# Patient Record
Sex: MALE | Race: WHITE | NOT HISPANIC OR LATINO | URBAN - METROPOLITAN AREA
[De-identification: names, ages, dates, MRNs, and addresses within clinical notes are randomized per-mention and may not be internally consistent; named-entity substitution may affect disease eponyms.]

---

## 2019-11-06 ENCOUNTER — INPATIENT (INPATIENT)
Facility: HOSPITAL | Age: 74
LOS: 8 days | Discharge: ANOTHER HOSPITAL | DRG: 280 | End: 2019-11-15
Attending: HOSPITALIST | Admitting: INTERNAL MEDICINE
Payer: MEDICARE

## 2019-11-06 VITALS
TEMPERATURE: 98 F | HEIGHT: 71 IN | WEIGHT: 179.9 LBS | RESPIRATION RATE: 18 BRPM | OXYGEN SATURATION: 98 % | HEART RATE: 99 BPM | DIASTOLIC BLOOD PRESSURE: 75 MMHG | SYSTOLIC BLOOD PRESSURE: 143 MMHG

## 2019-11-06 DIAGNOSIS — D64.9 ANEMIA, UNSPECIFIED: ICD-10-CM
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
## 2019-11-06 DIAGNOSIS — I50.810 RIGHT HEART FAILURE, UNSPECIFIED: ICD-10-CM

## 2019-11-06 DIAGNOSIS — N39.0 URINARY TRACT INFECTION, SITE NOT SPECIFIED: ICD-10-CM

## 2019-11-06 DIAGNOSIS — R79.1 ABNORMAL COAGULATION PROFILE: ICD-10-CM

## 2019-11-06 DIAGNOSIS — R74.0 NONSPECIFIC ELEVATION OF LEVELS OF TRANSAMINASE AND LACTIC ACID DEHYDROGENASE [LDH]: ICD-10-CM

## 2019-11-06 DIAGNOSIS — R06.03 ACUTE RESPIRATORY DISTRESS: ICD-10-CM

## 2019-11-06 DIAGNOSIS — N40.0 BENIGN PROSTATIC HYPERPLASIA WITHOUT LOWER URINARY TRACT SYMPTOMS: ICD-10-CM

## 2019-11-06 DIAGNOSIS — I21.3 ST ELEVATION (STEMI) MYOCARDIAL INFARCTION OF UNSPECIFIED SITE: ICD-10-CM

## 2019-11-06 DIAGNOSIS — Z29.9 ENCOUNTER FOR PROPHYLACTIC MEASURES, UNSPECIFIED: ICD-10-CM

## 2019-11-06 DIAGNOSIS — Z98.890 OTHER SPECIFIED POSTPROCEDURAL STATES: Chronic | ICD-10-CM

## 2019-11-06 DIAGNOSIS — I21.4 NON-ST ELEVATION (NSTEMI) MYOCARDIAL INFARCTION: ICD-10-CM

## 2019-11-06 LAB
ALBUMIN SERPL ELPH-MCNC: 3.3 G/DL — SIGNIFICANT CHANGE UP (ref 3.3–5)
ALP SERPL-CCNC: 157 U/L — HIGH (ref 40–120)
ALT FLD-CCNC: 61 U/L — HIGH (ref 10–45)
ANION GAP SERPL CALC-SCNC: 13 MMOL/L — SIGNIFICANT CHANGE UP (ref 5–17)
APTT BLD: >200 SEC — CRITICAL HIGH (ref 27.5–36.3)
AST SERPL-CCNC: 142 U/L — HIGH (ref 10–40)
BILIRUB SERPL-MCNC: 0.4 MG/DL — SIGNIFICANT CHANGE UP (ref 0.2–1.2)
BUN SERPL-MCNC: 16 MG/DL — SIGNIFICANT CHANGE UP (ref 7–23)
CALCIUM SERPL-MCNC: 8.1 MG/DL — LOW (ref 8.4–10.5)
CHLORIDE SERPL-SCNC: 102 MMOL/L — SIGNIFICANT CHANGE UP (ref 96–108)
CK SERPL-CCNC: 891 U/L — HIGH (ref 30–200)
CO2 SERPL-SCNC: 20 MMOL/L — LOW (ref 22–31)
CREAT SERPL-MCNC: 0.9 MG/DL — SIGNIFICANT CHANGE UP (ref 0.5–1.3)
D DIMER BLD IA.RAPID-MCNC: 2041 NG/ML DDU — HIGH
GAS PNL BLDA: SIGNIFICANT CHANGE UP
GLUCOSE SERPL-MCNC: 207 MG/DL — HIGH (ref 70–99)
HCT VFR BLD CALC: 33.9 % — LOW (ref 39–50)
HGB BLD-MCNC: 11 G/DL — LOW (ref 13–17)
INR BLD: 3.35 RATIO — HIGH (ref 0.88–1.16)
MCHC RBC-ENTMCNC: 31.2 PG — SIGNIFICANT CHANGE UP (ref 27–34)
MCHC RBC-ENTMCNC: 32.4 GM/DL — SIGNIFICANT CHANGE UP (ref 32–36)
MCV RBC AUTO: 96 FL — SIGNIFICANT CHANGE UP (ref 80–100)
NRBC # BLD: 0 /100 WBCS — SIGNIFICANT CHANGE UP (ref 0–0)
NT-PROBNP SERPL-SCNC: 3206 PG/ML — HIGH (ref 0–300)
PLATELET # BLD AUTO: 146 K/UL — LOW (ref 150–400)
POTASSIUM SERPL-MCNC: 4.1 MMOL/L — SIGNIFICANT CHANGE UP (ref 3.5–5.3)
POTASSIUM SERPL-SCNC: 4.1 MMOL/L — SIGNIFICANT CHANGE UP (ref 3.5–5.3)
PROT SERPL-MCNC: 6 G/DL — SIGNIFICANT CHANGE UP (ref 6–8.3)
PROTHROM AB SERPL-ACNC: 40 SEC — HIGH (ref 10–12.9)
RBC # BLD: 3.53 M/UL — LOW (ref 4.2–5.8)
RBC # FLD: 13.3 % — SIGNIFICANT CHANGE UP (ref 10.3–14.5)
SODIUM SERPL-SCNC: 135 MMOL/L — SIGNIFICANT CHANGE UP (ref 135–145)
TROPONIN T, HIGH SENSITIVITY RESULT: 3943 NG/L — HIGH (ref 0–51)
WBC # BLD: 10.44 K/UL — SIGNIFICANT CHANGE UP (ref 3.8–10.5)
WBC # FLD AUTO: 10.44 K/UL — SIGNIFICANT CHANGE UP (ref 3.8–10.5)

## 2019-11-06 PROCEDURE — 93306 TTE W/DOPPLER COMPLETE: CPT | Mod: 26

## 2019-11-06 PROCEDURE — 99223 1ST HOSP IP/OBS HIGH 75: CPT | Mod: GC

## 2019-11-06 PROCEDURE — 93010 ELECTROCARDIOGRAM REPORT: CPT

## 2019-11-06 PROCEDURE — 71045 X-RAY EXAM CHEST 1 VIEW: CPT | Mod: 26

## 2019-11-06 RX ORDER — HEPARIN SODIUM 5000 [USP'U]/ML
5000 INJECTION INTRAVENOUS; SUBCUTANEOUS EVERY 6 HOURS
Refills: 0 | Status: DISCONTINUED | OUTPATIENT
Start: 2019-11-06 | End: 2019-11-08

## 2019-11-06 RX ORDER — ATORVASTATIN CALCIUM 80 MG/1
40 TABLET, FILM COATED ORAL AT BEDTIME
Refills: 0 | Status: DISCONTINUED | OUTPATIENT
Start: 2019-11-06 | End: 2019-11-15

## 2019-11-06 RX ORDER — INFLUENZA VIRUS VACCINE 15; 15; 15; 15 UG/.5ML; UG/.5ML; UG/.5ML; UG/.5ML
0.5 SUSPENSION INTRAMUSCULAR ONCE
Refills: 0 | Status: DISCONTINUED | OUTPATIENT
Start: 2019-11-06 | End: 2019-11-15

## 2019-11-06 RX ORDER — CLOPIDOGREL BISULFATE 75 MG/1
75 TABLET, FILM COATED ORAL DAILY
Refills: 0 | Status: DISCONTINUED | OUTPATIENT
Start: 2019-11-06 | End: 2019-11-12

## 2019-11-06 RX ORDER — LINEZOLID 600 MG/300ML
1 INJECTION, SOLUTION INTRAVENOUS
Qty: 0 | Refills: 0 | DISCHARGE

## 2019-11-06 RX ORDER — CEFTRIAXONE 500 MG/1
1000 INJECTION, POWDER, FOR SOLUTION INTRAMUSCULAR; INTRAVENOUS EVERY 24 HOURS
Refills: 0 | Status: DISCONTINUED | OUTPATIENT
Start: 2019-11-06 | End: 2019-11-10

## 2019-11-06 RX ORDER — MELOXICAM 15 MG/1
1 TABLET ORAL
Qty: 0 | Refills: 0 | DISCHARGE

## 2019-11-06 RX ORDER — HEPARIN SODIUM 5000 [USP'U]/ML
INJECTION INTRAVENOUS; SUBCUTANEOUS
Qty: 25000 | Refills: 0 | Status: DISCONTINUED | OUTPATIENT
Start: 2019-11-06 | End: 2019-11-08

## 2019-11-06 RX ORDER — HEPARIN SODIUM 5000 [USP'U]/ML
1690 INJECTION INTRAVENOUS; SUBCUTANEOUS
Qty: 25000 | Refills: 0 | Status: DISCONTINUED | OUTPATIENT
Start: 2019-11-06 | End: 2019-11-06

## 2019-11-06 RX ORDER — ASPIRIN/CALCIUM CARB/MAGNESIUM 324 MG
81 TABLET ORAL DAILY
Refills: 0 | Status: DISCONTINUED | OUTPATIENT
Start: 2019-11-06 | End: 2019-11-15

## 2019-11-06 RX ORDER — CLOPIDOGREL BISULFATE 75 MG/1
1 TABLET, FILM COATED ORAL
Qty: 0 | Refills: 0 | DISCHARGE

## 2019-11-06 RX ADMIN — ATORVASTATIN CALCIUM 40 MILLIGRAM(S): 80 TABLET, FILM COATED ORAL at 21:41

## 2019-11-06 RX ADMIN — HEPARIN SODIUM 1000 UNIT(S)/HR: 5000 INJECTION INTRAVENOUS; SUBCUTANEOUS at 19:51

## 2019-11-06 NOTE — H&P ADULT - NSICDXPASTMEDICALHX_GEN_ALL_CORE_FT
PAST MEDICAL HISTORY:  Urinary tract infection associated with catheterization of urinary tract, unspecified indwelling urinary catheter type, subsequent encounter

## 2019-11-06 NOTE — H&P ADULT - PROBLEM SELECTOR PLAN 7
bladder obstruction on imaging from OSH noted, difficulty placing farnsworth   check bladder scan bladder obstruction on imaging from OSH noted, difficulty placing farnsworth   check bladder scan  strict Is and Os

## 2019-11-06 NOTE — PROVIDER CONTACT NOTE (CRITICAL VALUE NOTIFICATION) - TEST AND RESULT REPORTED:
Detail Level: Detailed Continue Regimen: Sensitive skin care regimen \\nCooler showers \\nDaily allergy pill in the morning \\Evelio night take Benadryl \\nApply Clobetasol cream twice a day for two weeks then break two weeks ptt greater than 200

## 2019-11-06 NOTE — H&P ADULT - PROBLEM SELECTOR PLAN 5
monitor CMP. AST > ALT  no abd pain, may be related to sepsis vs cardiac dysfxn no abd pain, AST>ALT, may be related to sepsis vs likely due to hepatic congestion from RV dysfxn.    monitor CMP  check hepatic panel and acetaminophen level  VA duplex of abd to r/o portal vein thrombosis

## 2019-11-06 NOTE — H&P ADULT - NSHPPHYSICALEXAM_GEN_ALL_CORE
Vital Signs Last 24 Hrs  T(C): 36.7 (06 Nov 2019 19:02), Max: 36.7 (06 Nov 2019 15:33)  T(F): 98.1 (06 Nov 2019 19:02), Max: 98.1 (06 Nov 2019 19:02)  HR: 59 (06 Nov 2019 19:02) (58 - 99)  BP: 115/67 (06 Nov 2019 19:02) (115/67 - 143/75)  BP(mean): 97 (06 Nov 2019 15:33) (97 - 97)  RR: 30 (06 Nov 2019 19:02) (18 - 30)  SpO2: 95% (06 Nov 2019 19:02) (95% - 98%)    PHYSICAL EXAM:  GENERAL: in distress   HEAD:  Atraumatic, Normocephalic  EYES: EOMI, PERRLA, conjunctiva and sclera clear  NECK: Supple, No JVD  CHEST/LUNG: Clear to auscultation bilaterally; No wheeze, increased WOB, using abd muscles, tachypneic  HEART: Odin; No murmurs, rubs, or gallops  ABDOMEN: Soft, Nontender, Nondistended; Bowel sounds present  EXTREMITIES:  2+ Peripheral Pulses, No clubbing, cyanosis, or edema  NEUROLOGY: AAOx3, LOBO  SKIN: No rashes or lesions Vital Signs Last 24 Hrs  T(C): 36.7 (06 Nov 2019 19:02), Max: 36.7 (06 Nov 2019 15:33)  T(F): 98.1 (06 Nov 2019 19:02), Max: 98.1 (06 Nov 2019 19:02)  HR: 59 (06 Nov 2019 19:02) (58 - 99)  BP: 115/67 (06 Nov 2019 19:02) (115/67 - 143/75)  BP(mean): 97 (06 Nov 2019 15:33) (97 - 97)  RR: 30 (06 Nov 2019 19:02) (18 - 30)  SpO2: 95% (06 Nov 2019 19:02) (95% - 98%)    PHYSICAL EXAM:  GENERAL: in distress   HEAD:  Atraumatic, Normocephalic  EYES: EOMI, PERRLA, conjunctiva and sclera clear  NECK: Supple, No JVD  CHEST/LUNG: mild basilar crackles; increased WOB, using abd muscles, tachypneic  HEART: Odin; No murmurs, rubs, or gallops  ABDOMEN: Soft, Nontender, Nondistended; Bowel sounds present  EXTREMITIES:  2+ Peripheral Pulses, No clubbing, cyanosis, or edema  NEUROLOGY: AAOx3, LOBO  SKIN: No rashes or lesions

## 2019-11-06 NOTE — H&P ADULT - NSHPSOCIALHISTORY_GEN_ALL_CORE
Lives with his daughter and 2 grandchildren. , has 3 children. Never smoker. Reports drinking once every 2 months. Endorses cocaine use 5 yrs ago but denies other illicit drug use or recent use. Has a cane but does not use it to ambulate. Retired [employer] worker.

## 2019-11-06 NOTE — H&P ADULT - PROBLEM SELECTOR PLAN 8
CTH (-) at OSH. Pt denies falls in the past yr. TSH, folate, B12 WNL.  PT c/s to assess gait stability  Monitor on tele

## 2019-11-06 NOTE — H&P ADULT - PROBLEM SELECTOR PLAN 6
RV dysfxn noted on TTE at OSH with mod TR, LVEF 60-65% RV dysfxn noted on TTE at OSH with mod TR, LVEF 60-65%   elevated BNP here  check official TTE and cardiology c/s regarding need for diuretics RV dysfxn noted on TTE at OSH with mod TR, LVEF 60-65%   elevated BNP here  check official TTE and cardiology c/s regarding need for diuretics  Prelim read by cardiology attending: enlarged RV, not well visualized and akinesis?

## 2019-11-06 NOTE — H&P ADULT - ASSESSMENT
This is a 73 yr old Male with PMHx of BPH, cocaine use, recurrent UTI([facility] hospitalization in 5/2019 for UTI/Sepsis), MRSA Bacteremia 2017 presented to ED [facility] 11/4/19 after a fall, admitted for STEMI. Transferred to [facility] for R/LHC, now having increased WOB, pleuritic CP and hypoxic

## 2019-11-06 NOTE — H&P ADULT - PROBLEM SELECTOR PLAN 4
Not on coumadin at home, no hx of liver disease  Repeat INR in the AM   May be due to cardiac dysfxn vs sepsis Not on coumadin at home, no hx of liver disease  Repeat INR in the AM   May be due to hepatic congestion vs sepsis

## 2019-11-06 NOTE — H&P ADULT - PROBLEM SELECTOR PLAN 3
on ceftriaxone  urethral swab from OSH growing >100k GNR on ceftriaxone, hx of recurrent UTIs   urethral swab from OSH growing >100k GNR  send urine culture for sensitivities

## 2019-11-06 NOTE — H&P ADULT - PROBLEM SELECTOR PLAN 1
with hypoxia, pleuritic CP, tachypnea, accessory muscle use. CXR read as clear. Euvolemic.  ABG demonstrating normal pH but with decreased CO2.   CTA to r/o PE  Check Cardiac enzymes, pro BNP, TTE to check for R heart strain  check blood cultures with hypoxia, pleuritic CP, tachypnea, accessory muscle use. CXR read as clear. Euvolemic.  ABG demonstrating normal pH but with decreased CO2.   CTA to r/o PE if TTE is concerning, Cardiology consult to help determine as pt is pending R/LHC and do not want to give pt double contrast load  Check Cardiac enzymes, pro BNP, TTE to check for R heart strain  check blood cultures with hypoxia, pleuritic CP, tachypnea, accessory muscle use. CXR read as clear. Euvolemic.  ABG demonstrating normal pH but with decreased CO2. Pleuritic chest pain may be related to cocaine induced vasospasms vs MI   CTA to r/o PE if TTE is concerning, Cardiology consult to help determine as pt is pending R/LHC and do not want to give pt double contrast load  Check Cardiac enzymes, pro BNP, TTE to check for R heart strain  check blood cultures with hypoxia, pleuritic CP, tachypnea, accessory muscle use;   CXR read as clear. Euvolemic.  ABG demonstrating normal pH but with decreased CO2. Pleuritic chest pain may be related to cocaine induced vasospasms vs MI   CTA to r/o PE if TTE is concerning, however hemodynamically stable now, on heparin gtt, and elevated INR; Cardiology consult to help determine as pt is pending R/LHC and do not want to give pt double contrast load  Check Cardiac enzymes, pro BNP, TTE to check for R heart strain  check blood cultures  trial of BIPAP for now

## 2019-11-06 NOTE — H&P CARDIOLOGY - HISTORY OF PRESENT ILLNESS
73 yr old Male with PMHx of recurrent UTI([facility] hospitalization in 5/2019 for UTI/Sepsis), MRSA Bacteremia 2017 presented to ED [facility] 11/4/19 after a fall. Patient reports feeling very hot on 11/4/19 and went to the bathroom and had a fall. Pt does not remember how he fell, but denies any LOC. Pt's daughter heard him fall and found him on the floor and unable to get up and therefore called EMS. Pt denies cp, sob, lightheadedness dizziness.    EKG showed RV infarct-0.5 mm ST elevations inferiorly and in V1-V2, ST Depressions laterally. TTE showed RV enlargement and dysfunction, LVEF 60-65%, RV volume overload, moderate to severe tricuspid regurgitation Pt was admitted in CCU and on pressors-Levophed. Urine toxicology showed positive for Cocaine.      Heparin drip started, ASA/Plavix started. Pt was transferred to [facility] for R/LHC. 73 yr old Male with PMHx of recurrent UTI([facility] hospitalization in 5/2019 for UTI/Sepsis), MRSA Bacteremia 2017 presented to ED [facility] 11/4/19 after a fall. Patient reports feeling very hot on 11/4/19 and went to the bathroom and had a fall. Pt does not remember how he fell, but denies any LOC. Pt's daughter heard him fall and found him on the floor and unable to get up and therefore called EMS. Pt denies cp, sob, lightheadedness dizziness.    EKG showed RV infarct-0.5 mm ST elevations inferiorly and in V1-V2, ST Depressions laterally. TTE showed RV enlargement and dysfunction, LVEF 60-65%, RV volume overload, moderate to severe tricuspid regurgitation Pt was admitted in CCU and on pressors-Levophed. Urine toxicology showed positive for Cocaine.    Cardiac enzymes elevated x 2, Heparin drip started, ASA/Plavix started. Pt was transferred to [facility] for R/LHC. 73 yr old Male with PMHx of recurrent UTI([facility] hospitalization in 5/2019 for UTI/Sepsis), MRSA Bacteremia 2017 presented to ED [facility] 11/4/19 after a fall. Patient reports feeling very hot on 11/4/19 and went to the bathroom and had a fall. Pt does not remember how he fell, but denies any LOC. Pt's daughter heard him fall and found him on the floor and unable to get up and therefore called EMS. Pt denies cp, sob, lightheadedness dizziness.    EKG showed RV infarct-0.5 mm ST elevations inferiorly and in V1-V2, ST Depressions laterally. TTE showed RV enlargement and dysfunction, LVEF 60-65%, RV volume overload, moderate to severe tricuspid regurgitation Pt was admitted in CCU and on pressors-Levophed. Urine toxicology showed positive for Cocaine.  Urinanalysis positive for UTI-Ceftriaxone iv antibiotics.   Cardiac enzymes elevated x 2, Heparin drip started, ASA/Plavix started. Pt was transferred to [facility] for R/LHC.     Pt does not see any PMD. His Urologist is Dr Corona @ [place]. Pt denies any Drug use recently but [facility] lab reports positive for Cocaine. 73 yr old Male with PMHx of recurrent UTI([facility] hospitalization in 5/2019 for UTI/Sepsis), MRSA Bacteremia 2017 presented to ED [facility] 11/4/19 after a fall. Patient reports feeling very hot on 11/4/19 and went to the bathroom and had a fall. Pt does not remember how he fell, but denies any LOC. Pt's daughter heard him fall and found him on the floor and unable to get up and therefore called EMS. Pt denies cp, sob, lightheadedness dizziness.    EKG showed RV infarct-0.5 mm ST elevations inferiorly and in V1-V2, ST Depressions laterally. TTE showed RV enlargement and dysfunction, LVEF 60-65%, RV volume overload, moderate to severe tricuspid regurgitation Pt was admitted in CCU and on pressors-Levophed. Urine toxicology showed positive for Cocaine.  Urinanalysis positive for UTI-Ceftriaxone iv antibiotics. Cardiac enzymes elevated x 2, Heparin drip started, ASA/Plavix started. Pt was transferred to [facility] for R/LHC.     Pt does not see any PMD outside. His Urologist is Dr Corona @ [place]. Pt denies any Drug use recently but [facility] lab reports positive for Cocaine.

## 2019-11-06 NOTE — H&P ADULT - HISTORY OF PRESENT ILLNESS
This is a 73 yr old Male with PMHx of BPH, recurrent UTI([facility] hospitalization in 5/2019 for UTI/Sepsis), MRSA Bacteremia 2017, cocaine use, presented to ED [facility] 11/4/19 after a fall. Patient reports chills and muscle aches the evening of 11/3/19. He had not received a flu shot this year and his grandson had a URI so he believed he may be coming down with the flu. He went to the bathroom and fell. Pt does not recall how he fell, does not know if he had LOC and hit his head. His daughter heard the sound of his fall, and found him awake and called EMS. At [facility], pt became hypotensive and had elevated cardiac enzymes. EKG showed RV infarct-0.5 mm ST elevations inferiorly and in V1-V2, ST Depressions laterally. TTE showed RV enlargement and dysfunction, LVEF 60-65%, RV volume overload, moderate to severe tricuspid regurgitation Pt was admitted to the CCU. Urine toxicology was positive for Cocaine.  Urinalaysis positive for UTI- started on Ceftriaxone. Lactate was elevated to 3.1. TSH, folate, B12 WNL. CTH was negative. Heparin drip started, ASA/Plavix started. Pt was transferred to [facility] for R/LHC.   Currently pt is reporting new, different chest pain. It is located centrally, pleuritic, non-radiating, 6/10 in severity and a/w difficulty breathing. Per [facility] NP, pt's O2 sat dropped to 80s on room air, so he was placed on 3L NC.   He denies fevers, medication changes, recent antibiotics, rhinorrhea, sore throat, n/v, abd pain, diarrhea, constipation, rashes, recent travel. Only medication is ibuprofen 200mg 2 tabs once a day.

## 2019-11-07 DIAGNOSIS — I50.30 UNSPECIFIED DIASTOLIC (CONGESTIVE) HEART FAILURE: ICD-10-CM

## 2019-11-07 DIAGNOSIS — I21.4 NON-ST ELEVATION (NSTEMI) MYOCARDIAL INFARCTION: ICD-10-CM

## 2019-11-07 DIAGNOSIS — J96.01 ACUTE RESPIRATORY FAILURE WITH HYPOXIA: ICD-10-CM

## 2019-11-07 LAB
ALBUMIN SERPL ELPH-MCNC: 3.2 G/DL — LOW (ref 3.3–5)
ALP SERPL-CCNC: 205 U/L — HIGH (ref 40–120)
ALT FLD-CCNC: 65 U/L — HIGH (ref 10–45)
ANION GAP SERPL CALC-SCNC: 11 MMOL/L — SIGNIFICANT CHANGE UP (ref 5–17)
APAP SERPL-MCNC: <15 UG/ML — SIGNIFICANT CHANGE UP (ref 10–30)
APTT BLD: 139.6 SEC — CRITICAL HIGH (ref 27.5–36.3)
APTT BLD: 39 SEC — HIGH (ref 27.5–36.3)
APTT BLD: 40.9 SEC — HIGH (ref 27.5–36.3)
AST SERPL-CCNC: 103 U/L — HIGH (ref 10–40)
B PERT DNA SPEC QL NAA+PROBE: SIGNIFICANT CHANGE UP
BASOPHILS # BLD AUTO: 0.02 K/UL — SIGNIFICANT CHANGE UP (ref 0–0.2)
BASOPHILS NFR BLD AUTO: 0.2 % — SIGNIFICANT CHANGE UP (ref 0–2)
BILIRUB SERPL-MCNC: 0.9 MG/DL — SIGNIFICANT CHANGE UP (ref 0.2–1.2)
BUN SERPL-MCNC: 19 MG/DL — SIGNIFICANT CHANGE UP (ref 7–23)
C PNEUM DNA SPEC QL NAA+PROBE: SIGNIFICANT CHANGE UP
CALCIUM SERPL-MCNC: 8.4 MG/DL — SIGNIFICANT CHANGE UP (ref 8.4–10.5)
CHLORIDE SERPL-SCNC: 104 MMOL/L — SIGNIFICANT CHANGE UP (ref 96–108)
CK MB BLD-MCNC: 4.5 % — HIGH (ref 0–3.5)
CK MB CFR SERPL CALC: 15.9 NG/ML — HIGH (ref 0–6.7)
CK SERPL-CCNC: 353 U/L — HIGH (ref 30–200)
CK SERPL-CCNC: 475 U/L — HIGH (ref 30–200)
CO2 SERPL-SCNC: 20 MMOL/L — LOW (ref 22–31)
CREAT SERPL-MCNC: 1.3 MG/DL — SIGNIFICANT CHANGE UP (ref 0.5–1.3)
CULTURE RESULTS: NO GROWTH — SIGNIFICANT CHANGE UP
EOSINOPHIL # BLD AUTO: 0 K/UL — SIGNIFICANT CHANGE UP (ref 0–0.5)
EOSINOPHIL NFR BLD AUTO: 0 % — SIGNIFICANT CHANGE UP (ref 0–6)
FLUAV H1 2009 PAND RNA SPEC QL NAA+PROBE: SIGNIFICANT CHANGE UP
FLUAV H1 RNA SPEC QL NAA+PROBE: SIGNIFICANT CHANGE UP
FLUAV H3 RNA SPEC QL NAA+PROBE: SIGNIFICANT CHANGE UP
FLUAV SUBTYP SPEC NAA+PROBE: SIGNIFICANT CHANGE UP
FLUBV RNA SPEC QL NAA+PROBE: SIGNIFICANT CHANGE UP
GLUCOSE SERPL-MCNC: 144 MG/DL — HIGH (ref 70–99)
HADV DNA SPEC QL NAA+PROBE: SIGNIFICANT CHANGE UP
HAV IGM SER-ACNC: SIGNIFICANT CHANGE UP
HBV CORE IGM SER-ACNC: SIGNIFICANT CHANGE UP
HBV SURFACE AG SER-ACNC: SIGNIFICANT CHANGE UP
HCOV PNL SPEC NAA+PROBE: SIGNIFICANT CHANGE UP
HCT VFR BLD CALC: 30.6 % — LOW (ref 39–50)
HCT VFR BLD CALC: 31.7 % — LOW (ref 39–50)
HCV AB S/CO SERPL IA: 0.14 S/CO — SIGNIFICANT CHANGE UP (ref 0–0.99)
HCV AB SERPL-IMP: SIGNIFICANT CHANGE UP
HGB BLD-MCNC: 10.2 G/DL — LOW (ref 13–17)
HGB BLD-MCNC: 9.9 G/DL — LOW (ref 13–17)
HMPV RNA SPEC QL NAA+PROBE: SIGNIFICANT CHANGE UP
HPIV1 RNA SPEC QL NAA+PROBE: SIGNIFICANT CHANGE UP
HPIV2 RNA SPEC QL NAA+PROBE: SIGNIFICANT CHANGE UP
HPIV3 RNA SPEC QL NAA+PROBE: SIGNIFICANT CHANGE UP
HPIV4 RNA SPEC QL NAA+PROBE: SIGNIFICANT CHANGE UP
IMM GRANULOCYTES NFR BLD AUTO: 0.7 % — SIGNIFICANT CHANGE UP (ref 0–1.5)
INR BLD: 1.34 RATIO — HIGH (ref 0.88–1.16)
INR BLD: 1.44 RATIO — HIGH (ref 0.88–1.16)
LYMPHOCYTES # BLD AUTO: 1.45 K/UL — SIGNIFICANT CHANGE UP (ref 1–3.3)
LYMPHOCYTES # BLD AUTO: 13.5 % — SIGNIFICANT CHANGE UP (ref 13–44)
MAGNESIUM SERPL-MCNC: 2 MG/DL — SIGNIFICANT CHANGE UP (ref 1.6–2.6)
MCHC RBC-ENTMCNC: 31 PG — SIGNIFICANT CHANGE UP (ref 27–34)
MCHC RBC-ENTMCNC: 31 PG — SIGNIFICANT CHANGE UP (ref 27–34)
MCHC RBC-ENTMCNC: 32.2 GM/DL — SIGNIFICANT CHANGE UP (ref 32–36)
MCHC RBC-ENTMCNC: 32.4 GM/DL — SIGNIFICANT CHANGE UP (ref 32–36)
MCV RBC AUTO: 95.9 FL — SIGNIFICANT CHANGE UP (ref 80–100)
MCV RBC AUTO: 96.4 FL — SIGNIFICANT CHANGE UP (ref 80–100)
MONOCYTES # BLD AUTO: 1.27 K/UL — HIGH (ref 0–0.9)
MONOCYTES NFR BLD AUTO: 11.8 % — SIGNIFICANT CHANGE UP (ref 2–14)
NEUTROPHILS # BLD AUTO: 7.91 K/UL — HIGH (ref 1.8–7.4)
NEUTROPHILS NFR BLD AUTO: 73.8 % — SIGNIFICANT CHANGE UP (ref 43–77)
NRBC # BLD: 0 /100 WBCS — SIGNIFICANT CHANGE UP (ref 0–0)
NRBC # BLD: 0 /100 WBCS — SIGNIFICANT CHANGE UP (ref 0–0)
PHOSPHATE SERPL-MCNC: 2.5 MG/DL — SIGNIFICANT CHANGE UP (ref 2.5–4.5)
PLATELET # BLD AUTO: 141 K/UL — LOW (ref 150–400)
PLATELET # BLD AUTO: 152 K/UL — SIGNIFICANT CHANGE UP (ref 150–400)
POTASSIUM SERPL-MCNC: 4.3 MMOL/L — SIGNIFICANT CHANGE UP (ref 3.5–5.3)
POTASSIUM SERPL-SCNC: 4.3 MMOL/L — SIGNIFICANT CHANGE UP (ref 3.5–5.3)
PROT SERPL-MCNC: 6.3 G/DL — SIGNIFICANT CHANGE UP (ref 6–8.3)
PROTHROM AB SERPL-ACNC: 15.6 SEC — HIGH (ref 10–12.9)
PROTHROM AB SERPL-ACNC: 16.6 SEC — HIGH (ref 10–12.9)
RAPID RVP RESULT: SIGNIFICANT CHANGE UP
RBC # BLD: 3.19 M/UL — LOW (ref 4.2–5.8)
RBC # BLD: 3.29 M/UL — LOW (ref 4.2–5.8)
RBC # FLD: 13.2 % — SIGNIFICANT CHANGE UP (ref 10.3–14.5)
RBC # FLD: 13.2 % — SIGNIFICANT CHANGE UP (ref 10.3–14.5)
RSV RNA SPEC QL NAA+PROBE: SIGNIFICANT CHANGE UP
RV+EV RNA SPEC QL NAA+PROBE: SIGNIFICANT CHANGE UP
SODIUM SERPL-SCNC: 135 MMOL/L — SIGNIFICANT CHANGE UP (ref 135–145)
SPECIMEN SOURCE: SIGNIFICANT CHANGE UP
TROPONIN T, HIGH SENSITIVITY RESULT: 4847 NG/L — HIGH (ref 0–51)
TROPONIN T, HIGH SENSITIVITY RESULT: 5294 NG/L — HIGH (ref 0–51)
WBC # BLD: 10.73 K/UL — HIGH (ref 3.8–10.5)
WBC # BLD: 10.74 K/UL — HIGH (ref 3.8–10.5)
WBC # FLD AUTO: 10.73 K/UL — HIGH (ref 3.8–10.5)
WBC # FLD AUTO: 10.74 K/UL — HIGH (ref 3.8–10.5)

## 2019-11-07 PROCEDURE — 99233 SBSQ HOSP IP/OBS HIGH 50: CPT | Mod: GC

## 2019-11-07 PROCEDURE — 93975 VASCULAR STUDY: CPT | Mod: 26

## 2019-11-07 PROCEDURE — 99223 1ST HOSP IP/OBS HIGH 75: CPT | Mod: GC

## 2019-11-07 RX ORDER — IPRATROPIUM/ALBUTEROL SULFATE 18-103MCG
3 AEROSOL WITH ADAPTER (GRAM) INHALATION EVERY 6 HOURS
Refills: 0 | Status: DISCONTINUED | OUTPATIENT
Start: 2019-11-07 | End: 2019-11-12

## 2019-11-07 RX ORDER — SODIUM CHLORIDE 9 MG/ML
1000 INJECTION INTRAMUSCULAR; INTRAVENOUS; SUBCUTANEOUS
Refills: 0 | Status: DISCONTINUED | OUTPATIENT
Start: 2019-11-07 | End: 2019-11-10

## 2019-11-07 RX ORDER — ACETAMINOPHEN 500 MG
650 TABLET ORAL ONCE
Refills: 0 | Status: COMPLETED | OUTPATIENT
Start: 2019-11-07 | End: 2019-11-07

## 2019-11-07 RX ADMIN — HEPARIN SODIUM 1150 UNIT(S)/HR: 5000 INJECTION INTRAVENOUS; SUBCUTANEOUS at 03:04

## 2019-11-07 RX ADMIN — HEPARIN SODIUM 1150 UNIT(S)/HR: 5000 INJECTION INTRAVENOUS; SUBCUTANEOUS at 17:56

## 2019-11-07 RX ADMIN — Medication 81 MILLIGRAM(S): at 11:58

## 2019-11-07 RX ADMIN — SODIUM CHLORIDE 60 MILLILITER(S): 9 INJECTION INTRAMUSCULAR; INTRAVENOUS; SUBCUTANEOUS at 11:45

## 2019-11-07 RX ADMIN — Medication 3 MILLILITER(S): at 15:05

## 2019-11-07 RX ADMIN — CLOPIDOGREL BISULFATE 75 MILLIGRAM(S): 75 TABLET, FILM COATED ORAL at 11:58

## 2019-11-07 RX ADMIN — Medication 3 MILLILITER(S): at 21:41

## 2019-11-07 RX ADMIN — Medication 650 MILLIGRAM(S): at 01:03

## 2019-11-07 RX ADMIN — Medication 650 MILLIGRAM(S): at 01:20

## 2019-11-07 RX ADMIN — HEPARIN SODIUM 900 UNIT(S)/HR: 5000 INJECTION INTRAVENOUS; SUBCUTANEOUS at 11:07

## 2019-11-07 RX ADMIN — ATORVASTATIN CALCIUM 40 MILLIGRAM(S): 80 TABLET, FILM COATED ORAL at 21:41

## 2019-11-07 RX ADMIN — HEPARIN SODIUM 0 UNIT(S)/HR: 5000 INJECTION INTRAVENOUS; SUBCUTANEOUS at 09:51

## 2019-11-07 RX ADMIN — CEFTRIAXONE 100 MILLIGRAM(S): 500 INJECTION, POWDER, FOR SOLUTION INTRAMUSCULAR; INTRAVENOUS at 11:45

## 2019-11-07 RX ADMIN — HEPARIN SODIUM 5000 UNIT(S): 5000 INJECTION INTRAVENOUS; SUBCUTANEOUS at 17:57

## 2019-11-07 NOTE — PROGRESS NOTE ADULT - PROBLEM SELECTOR PLAN 3
Hx of recurrent UTIs on ceftriaxone   - OSH >100,000 GNR   - f/u UCX Hx of recurrent UTIs in the setting of BPH.  - OSH >100,000 GNR   - continue ceftriaxone 1g qd  - f/u UCX and SN   - monitor for retention

## 2019-11-07 NOTE — PHYSICAL THERAPY INITIAL EVALUATION ADULT - ADDITIONAL COMMENTS
Pt lives with daughter and granddaughter in private home, no steps to negotiate. Previously independent with all functional mobility, used SC at times.

## 2019-11-07 NOTE — PROGRESS NOTE ADULT - PROBLEM SELECTOR PLAN 6
RV dysfxn noted on TTE at OSH with mod TR, LVEF 60-65%   - BNP elevated 3206  - repeat TTE: EF 55-60%, some hypokinesis of LV wall Transaminitis likely transient 2/2 cardiac injury from NSTEMI   - US abdomen negative for PVT   - consider dedicated RUQ study   - will continue to monitor

## 2019-11-07 NOTE — PROGRESS NOTE ADULT - ASSESSMENT
73 yr old Male with PMHx of BPH, cocaine use, recurrent UTI([facility] hospitalization in 5/2019 for UTI/Sepsis), MRSA Bacteremia 2017 presented to ED from [facility] 11/4/19 after a fall, admitted for NSTEMI  Transferred to [facility] for R/LHC c/b AHRF 73 yr old Male with PMHx of BPH, cocaine use, recurrent UTI([facility] hospitalization in 5/2019 for UTI/Sepsis), MRSA Bacteremia 2017 presented to ED from [facility] 11/4/19 after a fall, admitted for NSTEMI transferred to [facility] for R/LHC course c/b AHRF

## 2019-11-07 NOTE — PROGRESS NOTE ADULT - PROBLEM SELECTOR PLAN 10
On hep gtt, will change to full AC DVT: hep gtt  Diet: Dash/TLC  Transitions of Care Status:  1.  Name of PCP:  2.  PCP Contacted on Admission: [ ] Y    [ ] N    3.  PCP contacted at Discharge: [ ] Y    [ ] N    [ ] N/A  4.  Post-Discharge Appointment Date and Location:  5.  Summary of Handoff given to PCP:

## 2019-11-07 NOTE — PROGRESS NOTE ADULT - PROBLEM SELECTOR PLAN 7
Bladder obstruction on imaging from OSH noted, difficulty placing farnwsorth   - check bladder scan  - strict Is and Os Bladder obstruction on imaging from OSH noted, difficulty placing farnsworth   - continue bladder scan   - strict Is and Os

## 2019-11-07 NOTE — PROGRESS NOTE ADULT - PROBLEM SELECTOR PLAN 5
Transaminitis likely 2/2 hepatorenal syndrome vs. 2/2 sepsis   - f/u VA duplex of abd to r/o portal vein thrombosis Transaminitis likely 2/2 cardiac injury  - f/u VA duplex of abd to r/o portal vein thrombosis Not on AC at home. Alb normal/low   - will consider RUQ US to eval liver; r/o ascites

## 2019-11-07 NOTE — PROGRESS NOTE ADULT - PROBLEM SELECTOR PLAN 4
- not on AC   - consider cardiorenal syndrome - not on AC TTE with EF 55-60%. Normal right heart systolic fxn. Left ventricular akinetic with normal fxn. Mod TR. Borderline pul HTN. TTE with EF 55-60%. Normal right heart systolic fxn. Left ventricular akinetic with normal fxn. Mod TR. Borderline pul HTN.  - not on diuretics at home  - euvolemic on exam, monitor off diuretics

## 2019-11-07 NOTE — PHYSICAL THERAPY INITIAL EVALUATION ADULT - PERTINENT HX OF CURRENT PROBLEM, REHAB EVAL
74y/o M with PMHx of recurrent UTI([facility] hospitalization in 5/2019 for UTI/Sepsis), MRSA Bacteremia 2017 presented to ED [facility] 11/4/19 after a fall. Pt reports feeling very hot on 11/4/19 and went to the bathroom and had a fall. Pt does not remember how he fell, but denies any LOC.

## 2019-11-07 NOTE — PROGRESS NOTE ADULT - PROBLEM SELECTOR PLAN 1
HS trops elevated  3949 --> 5295 with EKG showing nonspecific changes. Likely 2/2 cocaine use.   - c/w asa, clopidogrel 75 mg   - c/w atorvastatin 40 mg  - hold BB  - c/w heparin gtt  - cards recs appreciated: plan for L/RHC today HS trops elevated  3949 --> 5295 with EKG showing nonspecific changes. Likely 2/2 cocaine use.   - c/w asa, clopidogrel 75 mg   - c/w atorvastatin 40 mg  - hold BB  - c/w heparin gtt  - cards recs appreciated: plan for L/RHC once infection resolves HS trops elevated  3949 --> 5295. EKG at OSH sig for 0.5 ST elevation in inf leads and ST dep in V1-V2. New EKG showing nonspecific changes.   - cp and elevated trops likely 2/2 cocaine-induced cardiac injury   - c/w asa, clopidogrel 75 mg   - c/w atorvastatin 40 mg  - hold BB  - c/w heparin gtt  - cards recs appreciated: plan for L/RHC once patient afebrile

## 2019-11-07 NOTE — CHART NOTE - NSCHARTNOTEFT_GEN_A_CORE
Called to bedside by RN to evaluate left groin and hip ecchymosis. Patient noted to be sleeping on left side most of the time, and on heparin drip.  Area noted to be soft and non-tender. Upon further investigation, patient had a femoral line placed at [facility] (unsure if central line or A-line-not clarified in [facility] paperwork).  Team 3 called to evaluate; not concerning at this time and will continue to monitor size. Patient remains comfortable in bed; stat CBC sent as a precaution.     Joyce Barajas, RADHA  [phone]

## 2019-11-07 NOTE — PROGRESS NOTE ADULT - PROBLEM SELECTOR PLAN 2
Patient with inc tachypnea to 33, hypoxia, pleuritic cp, and inc wob initially. CXR wnl.   CTA to r/o PE held to avoid double contrast study exposure  - stable respiring on NC   - CTM  - will reassess need after cath Patient with inc tachypnea to 33, hypoxia, pleuritic cp, and inc wob initially. CXR wnl.   CTA to r/o PE held to avoid double contrast study exposure  - stable respiring on NC but appearing unstable on exertion   - CXR without pulm edema, euvolemic on exam  - slight wheezing noted  - f/u RVP   - CTM on NC and bipap as needed Patient with inc tachypnea to 33, hypoxia, pleuritic cp, and inc wob initially. CXR wnl.   CTA to r/o PE held to avoid double contrast study exposure  - stable respiring on NC at rest but appearing unstable w/ inc WOB on exertion   - CXR without pulm edema, euvolemic on exam  - slight wheezing noted  - f/u RVP   - duonebs PRN   - guaifenesin for cough   - CTM on NC and bipap as needed

## 2019-11-07 NOTE — PROGRESS NOTE ADULT - SUBJECTIVE AND OBJECTIVE BOX
***************************************************************  Dr. Josefina Melissa  Internal Medicine   [facility] Pager: [phone]  [facility] Pager: [phone]   ***************************************************************    ALEX DE LEON  73y  MRN: [id number]    Subjective:    Patient is a 73y old  Male who presents with a chief complaint of STEMI (07 Nov 2019 00:22)      Interval history/overnight events:    KATERIN ON. Plan for LHC/RHC today.       MEDICATIONS  (STANDING):  aspirin enteric coated 81 milliGRAM(s) Oral daily  atorvastatin 40 milliGRAM(s) Oral at bedtime  cefTRIAXone   IVPB 1000 milliGRAM(s) IV Intermittent every 24 hours  clopidogrel Tablet 75 milliGRAM(s) Oral daily  heparin  Infusion.  Unit(s)/Hr (10 mL/Hr) IV Continuous <Continuous>  influenza   Vaccine 0.5 milliLiter(s) IntraMuscular once    MEDICATIONS  (PRN):  heparin  Injectable 5000 Unit(s) IV Push every 6 hours PRN For aPTT less than 40        Objective:    Vitals: Vital Signs Last 24 Hrs  T(C): 36.4 (11-07-19 @ 04:38), Max: 38.3 (11-07-19 @ 00:30)  T(F): 97.6 (11-07-19 @ 04:38), Max: 101 (11-07-19 @ 00:30)  HR: 54 (11-07-19 @ 05:58) (51 - 99)  BP: 96/61 (11-07-19 @ 04:38) (96/61 - 143/75)  BP(mean): 97 (11-06-19 @ 15:33) (97 - 97)  RR: 33 (11-07-19 @ 04:38) (18 - 33)  SpO2: 99% (11-07-19 @ 05:58) (95% - 99%)            I&O's Summary    06 Nov 2019 07:01  -  07 Nov 2019 07:00  --------------------------------------------------------  IN: 480 mL / OUT: 500 mL / NET: -20 mL        LABS:    11-07    135  |  104  |  19  ----------------------------<  144<H>  4.3   |  20<L>  |  1.30  11-06    135  |  102  |  16  ----------------------------<  207<H>  4.1   |  20<L>  |  0.90    Ca    8.4      07 Nov 2019 05:56  Ca    8.1<L>      06 Nov 2019 17:29  Phos  2.5     11-07  Mg     2.0     11-07    TPro  6.3  /  Alb  3.2<L>  /  TBili  0.9  /  DBili  x   /  AST  103<H>  /  ALT  65<H>  /  AlkPhos  205<H>  11-07  TPro  6.0  /  Alb  3.3  /  TBili  0.4  /  DBili  x   /  AST  142<H>  /  ALT  61<H>  /  AlkPhos  157<H>  11-06    PT/INR - ( 07 Nov 2019 01:56 )   PT: 15.6 sec;   INR: 1.34 ratio         PTT - ( 07 Nov 2019 01:56 )  PTT:40.9 sec                ABG - ( 06 Nov 2019 19:35 )  pH, Arterial: 7.44  pH, Blood: x     /  pCO2: 28    /  pO2: 84    / HCO3: 19    / Base Excess: -4.0  /  SaO2: 97                                      10.2   10.73 )-----------( 152      ( 07 Nov 2019 05:56 )             31.7                         9.9    10.74 )-----------( 141      ( 07 Nov 2019 01:56 )             30.6                         11.0   10.44 )-----------( 146      ( 06 Nov 2019 17:29 )             33.9     CAPILLARY BLOOD GLUCOSE              RADIOLOGY & ADDITIONAL TESTS:            Imaging Personally Reviewed:  [ ] YES  [ ] NO    Consultants involved in case:   Consultant(s) Notes Reviewed:  [ ] YES  [ ] NO:   Care Discussed with Consultants/Other Providers [ ] YES  [ ] NO ***************************************************************  Dr. Josefina Melissa  Internal Medicine   [facility] Pager: [phone]  [facility] Pager: [phone]   ***************************************************************    ALEX DE LEON  73y  MRN: [id number]    Subjective:    Patient is a 73y old  Male who presents with a chief complaint of STEMI (07 Nov 2019 00:22)      Interval history/overnight events:    Febrile to 101 ON. This AM, denies cp, SOB, heart palpitations, fevers, chills, sweats, cough, rhinorrhea, dysuria, hematuria. Plan for cath on hold as patient w/ active infection.       MEDICATIONS  (STANDING):  aspirin enteric coated 81 milliGRAM(s) Oral daily  atorvastatin 40 milliGRAM(s) Oral at bedtime  cefTRIAXone   IVPB 1000 milliGRAM(s) IV Intermittent every 24 hours  clopidogrel Tablet 75 milliGRAM(s) Oral daily  heparin  Infusion.  Unit(s)/Hr (10 mL/Hr) IV Continuous <Continuous>  influenza   Vaccine 0.5 milliLiter(s) IntraMuscular once    MEDICATIONS  (PRN):  heparin  Injectable 5000 Unit(s) IV Push every 6 hours PRN For aPTT less than 40        Objective:    Vitals: Vital Signs Last 24 Hrs  T(C): 36.4 (11-07-19 @ 04:38), Max: 38.3 (11-07-19 @ 00:30)  T(F): 97.6 (11-07-19 @ 04:38), Max: 101 (11-07-19 @ 00:30)  HR: 54 (11-07-19 @ 05:58) (51 - 99)  BP: 96/61 (11-07-19 @ 04:38) (96/61 - 143/75)  BP(mean): 97 (11-06-19 @ 15:33) (97 - 97)  RR: 33 (11-07-19 @ 04:38) (18 - 33)  SpO2: 99% (11-07-19 @ 05:58) (95% - 99%)            I&O's Summary    06 Nov 2019 07:01  -  07 Nov 2019 07:00  --------------------------------------------------------  IN: 480 mL / OUT: 500 mL / NET: -20 mL    Physical exam:  General: elderly male in NAD respiring on 3L; inc distress on movement during exam tachypneic to 33 with inc WOB  HEENT: 2x2 cm ecchymosis on left frontotemporal scalp s/p fall, pupils equal, pinpt, slightly reactive to light   Lungs: faint bb crackles, right sided wheezing, not using accessory mm to breathe   Cardiac: RRR, no murmurs, gallops, rubs, no JVD, -HJR   Abdomen: soft, nondistended, normoactive, TTP suprapubically, no rebound, guarding, rigidity, negative CVA bl  Neurological: A&O x 3, strength 5/5 upper and lower extremities with gross sense of touch intact   : deferred       LABS:    11-07    135  |  104  |  19  ----------------------------<  144<H>  4.3   |  20<L>  |  1.30  11-06    135  |  102  |  16  ----------------------------<  207<H>  4.1   |  20<L>  |  0.90    Ca    8.4      07 Nov 2019 05:56  Ca    8.1<L>      06 Nov 2019 17:29  Phos  2.5     11-07  Mg     2.0     11-07    TPro  6.3  /  Alb  3.2<L>  /  TBili  0.9  /  DBili  x   /  AST  103<H>  /  ALT  65<H>  /  AlkPhos  205<H>  11-07  TPro  6.0  /  Alb  3.3  /  TBili  0.4  /  DBili  x   /  AST  142<H>  /  ALT  61<H>  /  AlkPhos  157<H>  11-06    PT/INR - ( 07 Nov 2019 01:56 )   PT: 15.6 sec;   INR: 1.34 ratio         PTT - ( 07 Nov 2019 01:56 )  PTT:40.9 sec                ABG - ( 06 Nov 2019 19:35 )  pH, Arterial: 7.44  pH, Blood: x     /  pCO2: 28    /  pO2: 84    / HCO3: 19    / Base Excess: -4.0  /  SaO2: 97                                      10.2   10.73 )-----------( 152      ( 07 Nov 2019 05:56 )             31.7                         9.9    10.74 )-----------( 141      ( 07 Nov 2019 01:56 )             30.6                         11.0   10.44 )-----------( 146      ( 06 Nov 2019 17:29 )             33.9     CAPILLARY BLOOD GLUCOSE              RADIOLOGY & ADDITIONAL TESTS:      EXAM:  XR CHEST PORTABLE URGENT 1V                            PROCEDURE DATE:  11/06/2019            INTERPRETATION:  CLINICAL INDICATION: UTI, assess for pneumonia    TECHNIQUE:   Single view frontal radiograph of the chest    COMPARISON: No similar prior comparisons available.    FINDINGS:     No acute osseous abnormality. The cardiac size is not well assessed on   this AP radiograph. The lungs are clear bilaterally. There is no pleural   effusion or pneumothorax.    IMPRESSION:    Clear lungs.                  Imaging Personally Reviewed:  [ ] YES  [ ] NO    Consultants involved in case:   Consultant(s) Notes Reviewed:  [ ] YES  [ ] NO:   Care Discussed with Consultants/Other Providers [ ] YES  [ ] NO

## 2019-11-07 NOTE — CONSULT NOTE ADULT - ASSESSMENT
73 M w/ BPH, recurrent UTI, cocaine use initially admitted to [facility] transferred for NSTEMI pending R/LHC.    #NSTEMI  -May be cocaine induced rather than acute plaque rupture. 73 M w/ BPH, recurrent UTI, cocaine use initially admitted to [facility] transferred for NSTEMI pending R/LHC.    #NSTEMI  -May be cocaine induced rather than acute plaque rupture, but would c/w DAPT and hep gtt for now.  -c/w lipitor, hold BB for now  -Plan for LHC +/- RHC in AM  -Does not appear floridly volume overloaded at this time, CXR w/ mild vascular congestion. Can hold diuresis for now.    Joshua Tomlinson PGY4  [phone]  All Cardiology service information can be found 24/7 on [email or web address], password: [email or web address] 73 M w/ BPH, recurrent UTI, cocaine use initially admitted to [facility] transferred for NSTEMI pending R/LHC.      #1.  N-STEMI  -May be cocaine induced rather than acute plaque rupture, but would c/w DAPT and hep gtt for now.  -c/w Lipitor, hold BB for now  -Plan for LHC +/- RHC in AM  -Does not appear notably volume overloaded at this time; can hold diuresis for now.      Joshua Tomlinson PGY4  [phone]    Prince Li M.D.  Cardiology Attending, Consult Service  [phone] or beeper     For all Cardiology service contact information, go to [email or web address] and use "[email or web address]" to login.

## 2019-11-07 NOTE — CONSULT NOTE ADULT - SUBJECTIVE AND OBJECTIVE BOX
All Cardiology service information can be found 24/7 on [email or web address], password: [email or web address]    Patient seen and evaluated at bedside    Chief Complaint: fall    HPI:    73 M w/ BPH, recurrent UTI, cocaine use initially admitted to [facility] with fall found to be hypotensive requiring pressor support with elevated troponins. While in [facility], pt w/ EKG w/ 0.5mm PANCHO in inferior leads and V1-V2 w/ STD laterally and TTE revealed RV dysfunction w/ mod-severe tricuspid regurg and patient transferred to [facility] for R/LHC. Pt at time of eval reports sharp pain when he takes a deep breath, but was otherwise resting comfortably and sleeping prior to being woken. Had dyspnea earlier but had since resolved.     PMHx:   Urinary tract infection associated with catheterization of urinary tract, unspecified indwelling urinary catheter type, subsequent encounter      PSHx:   S/P urological surgery      Allergies:  No Known Allergies      Home Meds: Reviewed    Current Medications:   aspirin enteric coated 81 milliGRAM(s) Oral daily  atorvastatin 40 milliGRAM(s) Oral at bedtime  cefTRIAXone   IVPB 1000 milliGRAM(s) IV Intermittent every 24 hours  clopidogrel Tablet 75 milliGRAM(s) Oral daily  heparin  Infusion.  Unit(s)/Hr IV Continuous <Continuous>  heparin  Injectable 5000 Unit(s) IV Push every 6 hours PRN  influenza   Vaccine 0.5 milliLiter(s) IntraMuscular once      FAMILY HISTORY:      Social History:  No smoking or alcohol, +cocaine use    REVIEW OF SYSTEMS:  CONSTITUTIONAL: No fevers or chills  EYES/ENT: No visual changes;  No dysphagia  NECK: No pain or stiffness  RESPIRATORY: +dyspnea  CARDIOVASCULAR: +pleuritic chest pain   GASTROINTESTINAL: No abdominal or epigastric pain. No nausea, vomiting, or hematemesis; No diarrhea or constipation. No melena or hematochezia.  BACK: No back pain  GENITOURINARY: No dysuria, frequency or hematuria  NEUROLOGICAL: No numbness or weakness  SKIN: No itching, burning, rashes, or lesions   All other review of systems is negative unless indicated above.    Physical Exam:  T(F): 98.9 (11-06), Max: 98.9 (11-06)  HR: 59 (11-06) (58 - 99)  BP: 128/69 (11-06) (115/67 - 143/75)  RR: 20 (11-06)  SpO2: 95% (11-06)  GENERAL: No acute distress  HEAD:  Atraumatic, Normocephalic  ENT: EOMI, PERRLA, conjunctiva and sclera clear  CHEST/LUNG: Clear to auscultation bilaterally; No wheeze, equal breath sounds bilaterally   BACK: No spinal tenderness  HEART: Regular rate and rhythm; No murmurs, rubs, or gallops  ABDOMEN: Soft, Nontender, Nondistended; Bowel sounds present  EXTREMITIES:  No clubbing, cyanosis, or edema  PSYCH: Nl behavior, nl affect  NEUROLOGY: non-focal, cranial nerves intact  SKIN: Normal color, No rashes or lesions  LINES:    Cardiovascular Diagnostic Testing:    ECG: Personally reviewed: sinus shy    Echo: Personally reviewed: < from: TTE with Doppler (w/Cont) (11.06.19 @ 20:13) >  Conclusions:  1. Normal left ventricular internal dimensions and wall  thicknesses.  2. Overall preserved left ventricular ejection fraction  despite segmental wall motion abnormalities. Endocardial  visualization enhanced with intravenous injection of  Ultrasonic Enhancing Agent (Definity). The basal  inferolateral wall, the basal anterolateral wall, and the  mid anterolateral wall are hypokinetic. The basal inferior  wall, and the mid inferior wall are akinetic.  3. Mild right atrial enlargement.  4. The right ventricle is not well visualized; grossly  Right ventricular enlargement with normal right ventricular  systolic function.  5. Estimated right ventricular systolic pressure equals 35  mm Hg, assuming right atrial pressure equals 8 mm Hg,  consistent with borderline pulmonary hypertension.  6. Normal tricuspid valve. Moderate tricuspid  regurgitation.  *** No previous Echo exam.    < end of copied text >      Labs: Personally reviewed                        11.0   10.44 )-----------( 146      ( 06 Nov 2019 17:29 )             33.9     11-06    135  |  102  |  16  ----------------------------<  207<H>  4.1   |  20<L>  |  0.90    Ca    8.1<L>      06 Nov 2019 17:29    TPro  6.0  /  Alb  3.3  /  TBili  0.4  /  DBili  x   /  AST  142<H>  /  ALT  61<H>  /  AlkPhos  157<H>  11-06    PT/INR - ( 06 Nov 2019 17:29 )   PT: 40.0 sec;   INR: 3.35 ratio         PTT - ( 06 Nov 2019 17:29 )  PTT:>200.0 sec    CARDIAC MARKERS ( 06 Nov 2019 17:29 )  3943 ng/L / x     / x     / 891 U/L / x     / x            Serum Pro-Brain Natriuretic Peptide: 3206 pg/mL (11-06 @ 17:29) All Cardiology service information can be found 24/7 on [email or web address], password: [email or web address]      73 M w/ hx. of cocaine use, initially admitted to [facility] with fall & found to be hypotensive requiring pressor support; elevated troponins also noted. While in [facility],  EKG w/ 0.5mm PANCHO in inferior leads and V1-V2 w/ STD laterally.  TTE revealed RV dysfunction w/ mod-severe tricuspid regurgitation.    Patient transferred to [facility] for R/LHC.   At this time, pt. reports sharp pain when he takes a deep breath, but was otherwise is resting comfortably.  Had dyspnea earlier, but this has resolved.     PMHx:   BPH  Urinary tract infection associated with catheterization of urinary tract, unspecified indwelling urinary catheter type, subsequent encounter    PSHx:   S/P urological surgery    Allergies:  No Known Allergies      Current Medications:   aspirin enteric coated 81 milliGRAM(s) Oral daily  atorvastatin 40 milliGRAM(s) Oral at bedtime  cefTRIAXone   IVPB 1000 milliGRAM(s) IV Intermittent every 24 hours  clopidogrel Tablet 75 milliGRAM(s) Oral daily  heparin  Infusion.  Unit(s)/Hr IV Continuous <Continuous>  heparin  Injectable 5000 Unit(s) IV Push every 6 hours PRN  influenza   Vaccine 0.5 milliLiter(s) IntraMuscular once      FAMILY HISTORY:  No heart disease      Social History:  No smoking or alcohol, +cocaine use    REVIEW OF SYSTEMS:  CONSTITUTIONAL: No fevers or chills  EYES/ENT: No visual changes;  No dysphagia  NECK: No pain or stiffness  RESPIRATORY: +dyspnea  CARDIOVASCULAR: +pleuritic chest pain   GASTROINTESTINAL: No abdominal or epigastric pain. No nausea, vomiting, or hematemesis; No diarrhea or constipation. No melena or hematochezia.  BACK: No back pain  GENITOURINARY: No dysuria, frequency or hematuria  NEUROLOGICAL: No numbness or weakness  SKIN: No itching, burning, rashes, or lesions   All other review of systems is negative unless indicated above.      Physical Exam:  T(F): 98.9 (11-06), Max: 98.9 (11-06)  HR: 59 (11-06) (58 - 99)  BP: 128/69 (11-06) (115/67 - 143/75)  RR: 20 (11-06)  SpO2: 95% (11-06)    GENERAL: No acute distress  HEAD:  Atraumatic, Normocephalic  ENT: EOMI, PERRLA, conjunctiva and sclera clear  CHEST/LUNG: Clear to auscultation bilaterally; No wheeze, equal breath sounds bilaterally   BACK: No spinal tenderness  HEART: Regular rate and rhythm; No murmurs, rubs, or gallops  ABDOMEN: Soft, Nontender, Nondistended; Bowel sounds present  EXTREMITIES:  No clubbing, cyanosis, or edema  PSYCH: Nl behavior, nl affect  NEUROLOGY: non-focal, cranial nerves intact  SKIN: Normal color, No rashes or lesions      12 Lead ECG (11.06.19 @ 15:38)   SB at 54 BPM   P-R Interval 128 ms, QRS Duration 104 ms, Q-T Interval 450 ms   Axis 52 degrees   IVCD, OTHERWISE NORMAL ECG    Confirmed by MD AZUL, [facility] ([id number]) on 11/7/2019 6:37:28 AM      TTE with Doppler (w/Cont) (11.06.19 @ 20:13) >  Conclusions:  1. Normal left ventricular internal dimensions and wall thicknesses.  2. Overall preserved left ventricular ejection fraction despite segmental wall motion abnormalities. Endocardial visualization enhanced with intravenous injection of Ultrasonic Enhancing Agent (Definity). The basal inferolateral wall, the basal anterolateral wall, and the mid anterolateral wall are hypokinetic. The basal inferior wall, and  the mid inferior wall are akinetic.  3. Mild right atrial enlargement.  4. The right ventricle is not well visualized; grossly right ventricular enlargement with normal right ventricular systolic function.   5. Estimated right ventricular systolic pressure equals 35 mm Hg, assuming right atrial pressure equals 8 mm Hg, consistent with borderline pulmonary hypertension.   6. Normal tricuspid valve. Moderate tricuspid regurgitation.      Labs:                       11.0   10.44 )-----------( 146      ( 06 Nov 2019 17:29 )             33.9     11-06  135  |  102  |  16  ----------------------------<  207<H>  4.1   |  20<L>  |  0.90    Ca    8.1<L>      06 Nov 2019 17:29    TPro  6.0  /  Alb  3.3  /  TBili  0.4  /  DBili  x   /  AST  142<H>  /  ALT  61<H>  /  AlkPhos  157<H>  11-06    PT/INR - ( 06 Nov 2019 17:29 )   PT: 40.0 sec;   INR: 3.35 ratio    PTT - ( 06 Nov 2019 17:29 )  PTT:>200.0 sec    CARDIAC MARKERS ( 06 Nov 2019 17:29 )  3943 ng/L / x     / x     / 891 U/L / x     / x        Serum Pro-Brain Natriuretic Peptide: 3206 pg/mL (11-06 @ 17:29)      Xray Chest 1 View- PORTABLE-Urgent (11.06.19 @ 17:00) >  ******PRELIMINARY REPORT******          PROCEDURE DATE:  11/06/2019      INTERPRETATION:  limited secondary to patient rotation, otherwise, no emergent findings.     follow up official report in AM     ******PRELIMINARY REPORT******          MONIKA SANTOS M.D., RADIOLOGY RESIDENT

## 2019-11-07 NOTE — PROGRESS NOTE ADULT - ATTENDING COMMENTS
Sent from outside hospital for possible cardiac catheterization for NSTEMI vs cocaine induced vasospasm.  Procedure currently held due to fever overnight.  Previous to being hospitalized pt endorses flu like symptoms and currently has a non productive cough and wheezing on exam.  Was diagnosed with a UTI at outside hospital and has been on Ceftriaxone.  At this point would check an RVP and start supportive care for a URI, nebs, anti-tussives.  I don't think his fever represents a new bacterial infection as he is already on abx, but would check blood cx to ensure that they are negative.

## 2019-11-07 NOTE — PHYSICAL THERAPY INITIAL EVALUATION ADULT - PRECAUTIONS/LIMITATIONS, REHAB EVAL
Pt's daughter heard him fall and found him on the floor and unable to get up and therefore called EMS. EKG showed RV infarct-0.5 mm ST elevations inferiorly and in V1-V2, ST Depressions laterally. TTE showed RV enlargement and dysfunction, LVEF 60-65%, RV volume overload, moderate to severe tricuspid regurgitation Pt was admitted in CCU and on pressors-Levophed. Urine toxicology showed positive for Cocaine. Urinanalysis positive for UTI-Ceftriaxone iv antibiotics. Cardiac enzymes elevated x 2, Heparin drip started, ASA/Plavix started. Pt was transferred to [facility] for R/LHC. USAbdomen (-) CXR (-)/cardiac precautions/fall precautions/oxygen therapy device and L/min

## 2019-11-07 NOTE — PHYSICAL THERAPY INITIAL EVALUATION ADULT - GENERAL OBSERVATIONS, REHAB EVAL
pt received semi-supine in bed in NAD, family at bedside, cardiac monitor, BP cuff, IV lock, 3L O2 NC

## 2019-11-07 NOTE — PROGRESS NOTE ADULT - PROBLEM SELECTOR PLAN 8
CTH (-) at OSH. Pt denies prior falls in the past yr. TSH, folate, B12 WNL.  - no focal deficits   - Monitor on tele CTH (-) at OSH. Pt denies prior falls in the past yr. As per patient, felt as if he had "the flu" few mins prior to syncope. Denied prodromal sx. TSH, folate, B12 WNL.  - likely 2/2 cardiac event   - no focal deficits   - Monitor on tele

## 2019-11-08 LAB
ANION GAP SERPL CALC-SCNC: 12 MMOL/L — SIGNIFICANT CHANGE UP (ref 5–17)
APTT BLD: 43.1 SEC — HIGH (ref 27.5–36.3)
APTT BLD: 44.3 SEC — HIGH (ref 27.5–36.3)
APTT BLD: 48.9 SEC — HIGH (ref 27.5–36.3)
BLD GP AB SCN SERPL QL: NEGATIVE — SIGNIFICANT CHANGE UP
BUN SERPL-MCNC: 24 MG/DL — HIGH (ref 7–23)
CALCIUM SERPL-MCNC: 8.4 MG/DL — SIGNIFICANT CHANGE UP (ref 8.4–10.5)
CHLORIDE SERPL-SCNC: 105 MMOL/L — SIGNIFICANT CHANGE UP (ref 96–108)
CO2 SERPL-SCNC: 19 MMOL/L — LOW (ref 22–31)
CREAT SERPL-MCNC: 1.19 MG/DL — SIGNIFICANT CHANGE UP (ref 0.5–1.3)
GLUCOSE SERPL-MCNC: 124 MG/DL — HIGH (ref 70–99)
HCT VFR BLD CALC: 26.7 % — LOW (ref 39–50)
HCT VFR BLD CALC: 28.8 % — LOW (ref 39–50)
HGB BLD-MCNC: 9 G/DL — LOW (ref 13–17)
HGB BLD-MCNC: 9.4 G/DL — LOW (ref 13–17)
INR BLD: 1.42 RATIO — HIGH (ref 0.88–1.16)
MCHC RBC-ENTMCNC: 31.1 PG — SIGNIFICANT CHANGE UP (ref 27–34)
MCHC RBC-ENTMCNC: 32 PG — SIGNIFICANT CHANGE UP (ref 27–34)
MCHC RBC-ENTMCNC: 32.6 GM/DL — SIGNIFICANT CHANGE UP (ref 32–36)
MCHC RBC-ENTMCNC: 33.7 GM/DL — SIGNIFICANT CHANGE UP (ref 32–36)
MCV RBC AUTO: 95 FL — SIGNIFICANT CHANGE UP (ref 80–100)
MCV RBC AUTO: 95.4 FL — SIGNIFICANT CHANGE UP (ref 80–100)
NRBC # BLD: 0 /100 WBCS — SIGNIFICANT CHANGE UP (ref 0–0)
NRBC # BLD: 0 /100 WBCS — SIGNIFICANT CHANGE UP (ref 0–0)
PLATELET # BLD AUTO: 120 K/UL — LOW (ref 150–400)
PLATELET # BLD AUTO: 145 K/UL — LOW (ref 150–400)
POTASSIUM SERPL-MCNC: 4 MMOL/L — SIGNIFICANT CHANGE UP (ref 3.5–5.3)
POTASSIUM SERPL-SCNC: 4 MMOL/L — SIGNIFICANT CHANGE UP (ref 3.5–5.3)
PROTHROM AB SERPL-ACNC: 16.4 SEC — HIGH (ref 10–12.9)
RBC # BLD: 2.81 M/UL — LOW (ref 4.2–5.8)
RBC # BLD: 3.02 M/UL — LOW (ref 4.2–5.8)
RBC # FLD: 13.2 % — SIGNIFICANT CHANGE UP (ref 10.3–14.5)
RBC # FLD: 13.3 % — SIGNIFICANT CHANGE UP (ref 10.3–14.5)
RH IG SCN BLD-IMP: POSITIVE — SIGNIFICANT CHANGE UP
SODIUM SERPL-SCNC: 136 MMOL/L — SIGNIFICANT CHANGE UP (ref 135–145)
WBC # BLD: 8.63 K/UL — SIGNIFICANT CHANGE UP (ref 3.8–10.5)
WBC # BLD: 8.68 K/UL — SIGNIFICANT CHANGE UP (ref 3.8–10.5)
WBC # FLD AUTO: 8.63 K/UL — SIGNIFICANT CHANGE UP (ref 3.8–10.5)
WBC # FLD AUTO: 8.68 K/UL — SIGNIFICANT CHANGE UP (ref 3.8–10.5)

## 2019-11-08 PROCEDURE — 93010 ELECTROCARDIOGRAM REPORT: CPT

## 2019-11-08 PROCEDURE — 99152 MOD SED SAME PHYS/QHP 5/>YRS: CPT

## 2019-11-08 PROCEDURE — 93458 L HRT ARTERY/VENTRICLE ANGIO: CPT | Mod: 26

## 2019-11-08 PROCEDURE — 99233 SBSQ HOSP IP/OBS HIGH 50: CPT | Mod: GC

## 2019-11-08 PROCEDURE — 99231 SBSQ HOSP IP/OBS SF/LOW 25: CPT

## 2019-11-08 PROCEDURE — 71045 X-RAY EXAM CHEST 1 VIEW: CPT | Mod: 26

## 2019-11-08 RX ORDER — FUROSEMIDE 40 MG
40 TABLET ORAL ONCE
Refills: 0 | Status: COMPLETED | OUTPATIENT
Start: 2019-11-08 | End: 2019-11-08

## 2019-11-08 RX ORDER — FUROSEMIDE 40 MG
20 TABLET ORAL ONCE
Refills: 0 | Status: COMPLETED | OUTPATIENT
Start: 2019-11-08 | End: 2019-11-08

## 2019-11-08 RX ADMIN — Medication 3 MILLILITER(S): at 17:37

## 2019-11-08 RX ADMIN — HEPARIN SODIUM 1450 UNIT(S)/HR: 5000 INJECTION INTRAVENOUS; SUBCUTANEOUS at 08:25

## 2019-11-08 RX ADMIN — Medication 3 MILLILITER(S): at 23:59

## 2019-11-08 RX ADMIN — CLOPIDOGREL BISULFATE 75 MILLIGRAM(S): 75 TABLET, FILM COATED ORAL at 11:01

## 2019-11-08 RX ADMIN — ATORVASTATIN CALCIUM 40 MILLIGRAM(S): 80 TABLET, FILM COATED ORAL at 21:10

## 2019-11-08 RX ADMIN — Medication 81 MILLIGRAM(S): at 11:01

## 2019-11-08 RX ADMIN — CEFTRIAXONE 100 MILLIGRAM(S): 500 INJECTION, POWDER, FOR SOLUTION INTRAMUSCULAR; INTRAVENOUS at 11:02

## 2019-11-08 RX ADMIN — Medication 3 MILLILITER(S): at 07:21

## 2019-11-08 RX ADMIN — Medication 20 MILLIGRAM(S): at 18:14

## 2019-11-08 RX ADMIN — Medication 3 MILLILITER(S): at 11:01

## 2019-11-08 RX ADMIN — Medication 40 MILLIGRAM(S): at 11:37

## 2019-11-08 RX ADMIN — HEPARIN SODIUM 1300 UNIT(S)/HR: 5000 INJECTION INTRAVENOUS; SUBCUTANEOUS at 01:04

## 2019-11-08 NOTE — PROGRESS NOTE ADULT - PROBLEM SELECTOR PLAN 2
Patient with inc tachypnea to 33, hypoxia, pleuritic cp, and inc wob initially. CXR wnl.   - stable respiring on NC at rest but appearing unstable w/ inc WOB on exertion   - CXR without pulm edema, euvolemic on exam  - slight wheezing noted  - RVP negative  - duonebs PRN   - guaifenesin for cough   - CTM on NC and bipap as needed Patient with inc tachypnea to 33, hypoxia, pleuritic cp, and inc wob initially. CXR wnl.   - CXR without pulm edema, euvolemic on exam  - RVP negative  - duonebs PRN   - guaifenesin for cough   - CTM on NC and bipap as needed  - encourage incentive spirometry

## 2019-11-08 NOTE — PROGRESS NOTE ADULT - PROBLEM SELECTOR PLAN 7
Bladder obstruction on imaging from OSH noted, difficulty placing farnsworth   - continue bladder scan   - strict Is and Os Bladder obstruction on imaging from OSH noted, difficulty placing farnsworth   - strict Is and Os  - bladder scan as needed

## 2019-11-08 NOTE — PROGRESS NOTE ADULT - PROBLEM SELECTOR PLAN 3
Hx of recurrent UTIs in the setting of BPH.  - OSH >100,000 GNR   - continue ceftriaxone 1g qd  - UCx NGTD likely as patient already on abx   - monitor for retention Hx of recurrent UTIs in the setting of BPH. OSH >100,000 GNR   - remains afebrile   - UCx NGTD likely as patient already on abx   - continue ceftriaxone 1g qd  - monitor for retention

## 2019-11-08 NOTE — PROGRESS NOTE ADULT - PROBLEM SELECTOR PLAN 5
Not on AC at home. Alb normal/low   - will consider RUQ US to eval liver; r/o ascites Not on AC at home. Alb normal/low   - likely transient from cardiac injury

## 2019-11-08 NOTE — PROGRESS NOTE ADULT - PROBLEM SELECTOR PLAN 6
Transaminitis likely transient 2/2 cardiac injury from NSTEMI   - US abdomen negative for PVT   - consider dedicated RUQ study   - will continue to monitor Transaminitis likely transient 2/2 cardiac injury from NSTEMI   - US abdomen negative for PVT  - CTM

## 2019-11-08 NOTE — PROVIDER CONTACT NOTE (OTHER) - BACKGROUND
Pt admitted with SOB and increased cardiac enzymes. Pt is s/p cath diagnostic to be managed medically.

## 2019-11-08 NOTE — PROGRESS NOTE ADULT - SUBJECTIVE AND OBJECTIVE BOX
This patient is followed by Cardiology Non-Service Consult Attending. For all clinical matters regarding this patient, please call the attending directly.     Contact information can be found at [email or web address], login "[email or web address]",  under Non-Service Consult Attending 7:30 AM - 5 PM.

## 2019-11-08 NOTE — PROGRESS NOTE ADULT - PROBLEM SELECTOR PLAN 10
DVT: hep gtt  Diet: Dash/TLC  Transitions of Care Status:  1.  Name of PCP:  2.  PCP Contacted on Admission: [ ] Y    [ ] N    3.  PCP contacted at Discharge: [ ] Y    [ ] N    [ ] N/A  4.  Post-Discharge Appointment Date and Location:  5.  Summary of Handoff given to PCP:

## 2019-11-08 NOTE — PROGRESS NOTE ADULT - SUBJECTIVE AND OBJECTIVE BOX
***************************************************************  Dr. Josefina Melissa  Internal Medicine   [facility] Pager: [phone]  [facility] Pager: [phone]   ***************************************************************    ALEX DE LEON  73y  MRN: [id number]    Subjective:    Patient is a 73y old  Male who presents with a chief complaint of STEMI (08 Nov 2019 05:59)      Interval history/overnight events:    KATERIN ON.       MEDICATIONS  (STANDING):  albuterol/ipratropium for Nebulization 3 milliLiter(s) Nebulizer every 6 hours  aspirin enteric coated 81 milliGRAM(s) Oral daily  atorvastatin 40 milliGRAM(s) Oral at bedtime  cefTRIAXone   IVPB 1000 milliGRAM(s) IV Intermittent every 24 hours  clopidogrel Tablet 75 milliGRAM(s) Oral daily  heparin  Infusion.  Unit(s)/Hr (10 mL/Hr) IV Continuous <Continuous>  influenza   Vaccine 0.5 milliLiter(s) IntraMuscular once  sodium chloride 0.9%. 1000 milliLiter(s) (60 mL/Hr) IV Continuous <Continuous>    MEDICATIONS  (PRN):  heparin  Injectable 5000 Unit(s) IV Push every 6 hours PRN For aPTT less than 40        Objective:    Vitals: Vital Signs Last 24 Hrs  T(C): 36.7 (11-08-19 @ 06:00), Max: 37.3 (11-07-19 @ 20:41)  T(F): 98 (11-08-19 @ 06:00), Max: 99.1 (11-07-19 @ 20:41)  HR: 51 (11-08-19 @ 06:00) (51 - 59)  BP: 100/60 (11-08-19 @ 06:00) (91/41 - 124/53)  BP(mean): --  RR: 18 (11-08-19 @ 06:00) (18 - 26)  SpO2: 94% (11-08-19 @ 06:00) (92% - 100%)            I&O's Summary    07 Nov 2019 07:01  -  08 Nov 2019 07:00  --------------------------------------------------------  IN: 480 mL / OUT: 400 mL / NET: 80 mL        LABS:    11-07    135  |  104  |  19  ----------------------------<  144<H>  4.3   |  20<L>  |  1.30  11-06    135  |  102  |  16  ----------------------------<  207<H>  4.1   |  20<L>  |  0.90    Ca    8.4      07 Nov 2019 05:56  Ca    8.1<L>      06 Nov 2019 17:29  Phos  2.5     11-07  Mg     2.0     11-07    TPro  6.3  /  Alb  3.2<L>  /  TBili  0.9  /  DBili  x   /  AST  103<H>  /  ALT  65<H>  /  AlkPhos  205<H>  11-07  TPro  6.0  /  Alb  3.3  /  TBili  0.4  /  DBili  x   /  AST  142<H>  /  ALT  61<H>  /  AlkPhos  157<H>  11-06    PT/INR - ( 07 Nov 2019 09:13 )   PT: 16.6 sec;   INR: 1.44 ratio         PTT - ( 08 Nov 2019 00:08 )  PTT:44.3 sec                ABG - ( 06 Nov 2019 19:35 )  pH, Arterial: 7.44  pH, Blood: x     /  pCO2: 28    /  pO2: 84    / HCO3: 19    / Base Excess: -4.0  /  SaO2: 97                                      9.0    8.63  )-----------( 120      ( 08 Nov 2019 00:08 )             26.7                         10.2   10.73 )-----------( 152      ( 07 Nov 2019 05:56 )             31.7                         9.9    10.74 )-----------( 141      ( 07 Nov 2019 01:56 )             30.6     CAPILLARY BLOOD GLUCOSE              RADIOLOGY & ADDITIONAL TESTS:            Imaging Personally Reviewed:  [ ] YES  [ ] NO    Consultants involved in case:   Consultant(s) Notes Reviewed:  [ ] YES  [ ] NO:   Care Discussed with Consultants/Other Providers [ ] YES  [ ] NO ***************************************************************  Dr. Josefina Melissa  Internal Medicine   [facility] Pager: [phone]  [facility] Pager: [phone]   ***************************************************************    ALEX DEL EON  73y  MRN: [id number]    Subjective:    Patient is a 73y old  Male who presents with a chief complaint of STEMI (08 Nov 2019 05:59)      Interval history/overnight events:    KATERIN ON. This AM c/o coughing same as yesterday and +SOB. Denies cp, heart palpitations, fevers, chills, sweats.       MEDICATIONS  (STANDING):  albuterol/ipratropium for Nebulization 3 milliLiter(s) Nebulizer every 6 hours  aspirin enteric coated 81 milliGRAM(s) Oral daily  atorvastatin 40 milliGRAM(s) Oral at bedtime  cefTRIAXone   IVPB 1000 milliGRAM(s) IV Intermittent every 24 hours  clopidogrel Tablet 75 milliGRAM(s) Oral daily  heparin  Infusion.  Unit(s)/Hr (10 mL/Hr) IV Continuous <Continuous>  influenza   Vaccine 0.5 milliLiter(s) IntraMuscular once  sodium chloride 0.9%. 1000 milliLiter(s) (60 mL/Hr) IV Continuous <Continuous>    MEDICATIONS  (PRN):  heparin  Injectable 5000 Unit(s) IV Push every 6 hours PRN For aPTT less than 40        Objective:    Vitals: Vital Signs Last 24 Hrs  T(C): 36.7 (11-08-19 @ 06:00), Max: 37.3 (11-07-19 @ 20:41)  T(F): 98 (11-08-19 @ 06:00), Max: 99.1 (11-07-19 @ 20:41)  HR: 51 (11-08-19 @ 06:00) (51 - 59)  BP: 100/60 (11-08-19 @ 06:00) (91/41 - 124/53)  BP(mean): --  RR: 18 (11-08-19 @ 06:00) (18 - 26)  SpO2: 94% (11-08-19 @ 06:00) (92% - 100%)            I&O's Summary    07 Nov 2019 07:01  -  08 Nov 2019 07:00  --------------------------------------------------------  IN: 480 mL / OUT: 400 mL / NET: 80 mL        LABS:    11-07    135  |  104  |  19  ----------------------------<  144<H>  4.3   |  20<L>  |  1.30  11-06    135  |  102  |  16  ----------------------------<  207<H>  4.1   |  20<L>  |  0.90    Ca    8.4      07 Nov 2019 05:56  Ca    8.1<L>      06 Nov 2019 17:29  Phos  2.5     11-07  Mg     2.0     11-07    TPro  6.3  /  Alb  3.2<L>  /  TBili  0.9  /  DBili  x   /  AST  103<H>  /  ALT  65<H>  /  AlkPhos  205<H>  11-07  TPro  6.0  /  Alb  3.3  /  TBili  0.4  /  DBili  x   /  AST  142<H>  /  ALT  61<H>  /  AlkPhos  157<H>  11-06    PT/INR - ( 07 Nov 2019 09:13 )   PT: 16.6 sec;   INR: 1.44 ratio         PTT - ( 08 Nov 2019 00:08 )  PTT:44.3 sec                ABG - ( 06 Nov 2019 19:35 )  pH, Arterial: 7.44  pH, Blood: x     /  pCO2: 28    /  pO2: 84    / HCO3: 19    / Base Excess: -4.0  /  SaO2: 97                                      9.0    8.63  )-----------( 120      ( 08 Nov 2019 00:08 )             26.7                         10.2   10.73 )-----------( 152      ( 07 Nov 2019 05:56 )             31.7                         9.9    10.74 )-----------( 141      ( 07 Nov 2019 01:56 )             30.6     CAPILLARY BLOOD GLUCOSE              RADIOLOGY & ADDITIONAL TESTS:            Imaging Personally Reviewed:  [ ] YES  [ ] NO    Consultants involved in case:   Consultant(s) Notes Reviewed:  [ ] YES  [ ] NO:   Care Discussed with Consultants/Other Providers [ ] YES  [ ] NO ***************************************************************  Dr. Josefina Melissa  Internal Medicine   [facility] Pager: [phone]  [facility] Pager: [phone]   ***************************************************************    ALEX DE LEON  73y  MRN: [id number]    Subjective:    Patient is a 73y old  Male who presents with a chief complaint of STEMI (08 Nov 2019 05:59)      Interval history/overnight events:    KATERIN ON. This AM c/o coughing same as yesterday and +SOB. Denies cp, heart palpitations, fevers, chills, sweats. c/o left inguinal ecchymosis at left femoral catheter site (d/andrew at OSH)      MEDICATIONS  (STANDING):  albuterol/ipratropium for Nebulization 3 milliLiter(s) Nebulizer every 6 hours  aspirin enteric coated 81 milliGRAM(s) Oral daily  atorvastatin 40 milliGRAM(s) Oral at bedtime  cefTRIAXone   IVPB 1000 milliGRAM(s) IV Intermittent every 24 hours  clopidogrel Tablet 75 milliGRAM(s) Oral daily  heparin  Infusion.  Unit(s)/Hr (10 mL/Hr) IV Continuous <Continuous>  influenza   Vaccine 0.5 milliLiter(s) IntraMuscular once  sodium chloride 0.9%. 1000 milliLiter(s) (60 mL/Hr) IV Continuous <Continuous>    MEDICATIONS  (PRN):  heparin  Injectable 5000 Unit(s) IV Push every 6 hours PRN For aPTT less than 40        Objective:    Vitals: Vital Signs Last 24 Hrs  T(C): 36.7 (11-08-19 @ 06:00), Max: 37.3 (11-07-19 @ 20:41)  T(F): 98 (11-08-19 @ 06:00), Max: 99.1 (11-07-19 @ 20:41)  HR: 51 (11-08-19 @ 06:00) (51 - 59)  BP: 100/60 (11-08-19 @ 06:00) (91/41 - 124/53)  BP(mean): --  RR: 18 (11-08-19 @ 06:00) (18 - 26)  SpO2: 94% (11-08-19 @ 06:00) (92% - 100%)            I&O's Summary    07 Nov 2019 07:01  -  08 Nov 2019 07:00  --------------------------------------------------------  IN: 480 mL / OUT: 400 mL / NET: 80 mL    Physical exam:  General: elderly male in NAD respiring on 4L lying in bed in NAD   HEENT: 2x2 cm ecchymosis on left frontotemporal scalp s/p fall, pupils equal, pinpt, slightly reactive to light   Lungs: bb crackles, no wheezing on exam, not using accessory mm to breathe   Cardiac: RRR, no murmurs, gallops, rubs, -JVD, -HJR   Abdomen: soft, nondistended, normoactive, no suprapubic tenderness, no rebound, guarding, rigidity, negative CVA bl  Neurological: A&O x 3, strength 5/5 upper and lower extremities with gross sense of touch intact   MSK: not accessed   Skin: large ecchymoses at left lateral hip, left inguinal region at prior fem cath site, no indurance suggestive of hematoma  Extremity: non-pitting edema noted bl; LLE PT, DP pulses 2+ bl   : deferred     LABS:    11-07    135  |  104  |  19  ----------------------------<  144<H>  4.3   |  20<L>  |  1.30  11-06    135  |  102  |  16  ----------------------------<  207<H>  4.1   |  20<L>  |  0.90    Ca    8.4      07 Nov 2019 05:56  Ca    8.1<L>      06 Nov 2019 17:29  Phos  2.5     11-07  Mg     2.0     11-07    TPro  6.3  /  Alb  3.2<L>  /  TBili  0.9  /  DBili  x   /  AST  103<H>  /  ALT  65<H>  /  AlkPhos  205<H>  11-07  TPro  6.0  /  Alb  3.3  /  TBili  0.4  /  DBili  x   /  AST  142<H>  /  ALT  61<H>  /  AlkPhos  157<H>  11-06    PT/INR - ( 07 Nov 2019 09:13 )   PT: 16.6 sec;   INR: 1.44 ratio         PTT - ( 08 Nov 2019 00:08 )  PTT:44.3 sec                ABG - ( 06 Nov 2019 19:35 )  pH, Arterial: 7.44  pH, Blood: x     /  pCO2: 28    /  pO2: 84    / HCO3: 19    / Base Excess: -4.0  /  SaO2: 97                                      9.0    8.63  )-----------( 120      ( 08 Nov 2019 00:08 )             26.7                         10.2   10.73 )-----------( 152      ( 07 Nov 2019 05:56 )             31.7                         9.9    10.74 )-----------( 141      ( 07 Nov 2019 01:56 )             30.6     CAPILLARY BLOOD GLUCOSE              RADIOLOGY & ADDITIONAL TESTS:            Imaging Personally Reviewed:  [ ] YES  [ ] NO    Consultants involved in case:   Consultant(s) Notes Reviewed:  [ ] YES  [ ] NO:   Care Discussed with Consultants/Other Providers [ ] YES  [ ] NO

## 2019-11-08 NOTE — PROGRESS NOTE ADULT - PROBLEM SELECTOR PLAN 1
HS trops elevated  3949 --> 5295. EKG at OSH sig for 0.5 ST elevation in inf leads and ST dep in V1-V2. New EKG showing nonspecific changes. 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   - c/w asa, clopidogrel 75 mg   - c/w atorvastatin 40 mg  - hold BB and lasix as patient w/ likely right-sided deficit   - c/w heparin gtt  - cards recs appreciated: plan for L/RHC once patient afebrile HS trops elevated  3949 --> 5295 --> 4847. EKG at OSH sig for 0.5 ST elevation in inf leads and ST dep in V1-V2. New EKG showing nonspecific changes. 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   - c/w asa, clopidogrel 75 mg   - c/w atorvastatin 40 mg  - hold BB and lasix as patient w/ likely right-sided deficit   - c/w heparin gtt  - cards recs appreciated: plan for L/RHC once patient afebrile HS trops elevated  3949 --> 5295 --> 4847. EKG at OSH sig for 0.5 ST elevation in inf leads and ST dep in V1-V2. New EKG showing nonspecific changes. r/o NSTEMI. 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   - c/w asa, clopidogrel 75 mg   - c/w atorvastatin 40 mg  - holding BB in setting of active cocaine use to prevent unchecked alpha activity   - holding lasix given likely right-sided deficit   - c/w heparin gtt  - cards recs appreciated: plan for L/RHC once patient afebrile

## 2019-11-08 NOTE — PROGRESS NOTE ADULT - PROBLEM SELECTOR PLAN 8
CTH (-) at OSH. Pt denies prior falls in the past yr. As per patient, felt as if he had "the flu" few mins prior to syncope. Denied prodromal sx. TSH, folate, B12 WNL.  - likely 2/2 cardiac event   - no focal deficits   - Monitor on tele

## 2019-11-08 NOTE — PROGRESS NOTE ADULT - ASSESSMENT
73 yr old Male with PMHx of BPH, cocaine use, recurrent UTI([facility] hospitalization in 5/2019 for UTI/Sepsis), MRSA Bacteremia 2017 presented to ED from [facility] 11/4/19 after a fall, admitted for NSTEMI transferred to [facility] for R/LHC course c/b AHRF

## 2019-11-08 NOTE — CHART NOTE - NSCHARTNOTEFT_GEN_A_CORE
Called by team re: ecchymosis of left hip.     Evaluated pt and area.     per pt and team; pt had femoral central line in place. Pt also currently on heparin drip for NSTEMI    Evaluated location:    diffuse ecchymosis observed, though areas was soft, nontender.   femoral pulse 2+  DP/Pt pulse present  LE warm well perfused    As cbc has been stable, and area is not indurated, will advise primary day team to monitor area, cbc    Will dw primary team.     Plan:    Will ctm area.     Eamon Cárdenas, pgy 1

## 2019-11-08 NOTE — PROGRESS NOTE ADULT - SUBJECTIVE AND OBJECTIVE BOX
SUBJ: no CP, trop slight downtrend, hb stable    MEDICATIONS  (STANDING):  albuterol/ipratropium for Nebulization 3 milliLiter(s) Nebulizer every 6 hours  aspirin enteric coated 81 milliGRAM(s) Oral daily  atorvastatin 40 milliGRAM(s) Oral at bedtime  cefTRIAXone   IVPB 1000 milliGRAM(s) IV Intermittent every 24 hours  clopidogrel Tablet 75 milliGRAM(s) Oral daily  influenza   Vaccine 0.5 milliLiter(s) IntraMuscular once  sodium chloride 0.9%. 1000 milliLiter(s) (60 mL/Hr) IV Continuous <Continuous>    MEDICATIONS  (PRN):      Vital Signs Last 24 Hrs  T(C): 36.5 (08 Nov 2019 12:00), Max: 37.3 (07 Nov 2019 20:41)  T(F): 97.7 (08 Nov 2019 12:00), Max: 99.1 (07 Nov 2019 20:41)  HR: 56 (08 Nov 2019 12:00) (51 - 59)  BP: 115/69 (08 Nov 2019 12:00) (95/49 - 115/69)  BP(mean): --  RR: 18 (08 Nov 2019 12:00) (18 - 20)  SpO2: 97% (08 Nov 2019 12:00) (92% - 99%)    REVIEW OF SYSTEMS:  CONSTITUTIONAL: No fever, weight loss, or fatigue  EYES: No eye pain, visual disturbances, or discharge  ENMT:  No difficulty hearing, tinnitus, vertigo; No sinus or throat pain  NECK: No pain or stiffness  RESPIRATORY: No cough, wheezing, chills or hemoptysis; No shortness of breath  CARDIOVASCULAR: No chest pain, palpitations, dizziness, or leg swelling  GASTROINTESTINAL: No abdominal or epigastric pain. No nausea, vomiting, or hematemesis; No diarrhea or constipation. No melena or hematochezia.  GENITOURINARY: No dysuria, frequency, hematuria, or incontinence  NEUROLOGICAL: No headaches, memory loss, loss of strength, numbness, or tremors  SKIN: No itching, burning, rashes, or lesions   LYMPH NODES: No enlarged glands  ENDOCRINE: No heat or cold intolerance; No hair loss  MUSCULOSKELETAL: No joint pain or swelling; No muscle, back, or extremity pain  PSYCHIATRIC: No depression, anxiety, mood swings, or difficulty sleeping  HEME/LYMPH: No easy bruising, or bleeding gums  ALLERY AND IMMUNOLOGIC: No hives or eczema          PHYSICAL EXAM:  · CONSTITUTIONAL: Well-developed, well nourished      · EYES: EOMI; PERRL; no drainage or redness  · NECK: No bruits; no thyromegaly or nodules  ·RESPIRATORY:   airway patent; breath sounds equal; good air movement; respirations non-labored; clear to auscultation bilaterally; no chest wall tenderness; no intercostal retractions; no rales,rhonchi or wheeze  · CARDIOVASCULAR: regular rate and rhythm  no rub  no murmur  normal PMI  . GASTROINTESTINAL:  no distention; no masses palpable; bowel sounds normal  · EXTREMITIES: No cyanosis, clubbing or edema, + ecchymosis on hip  · VASCULAR:  Equal and normal pulses (radial, femoral)  ·NEUROLOGICAL:  Alert and oriented x 3; s· SKIN: No lesions; no rash  . LYMPH NODES: No lymphadedenopathy  · MUSCULOSKELETAL:  No calf tenderness  no joint swelling	    TELEMETRY:sr 55, no events      TTE:    LABS:   CBC Full  -  ( 08 Nov 2019 07:19 )  WBC Count : 8.68 K/uL  RBC Count : 3.02 M/uL  Hemoglobin : 9.4 g/dL  Hematocrit : 28.8 %  Platelet Count - Automated : 145 K/uL  Mean Cell Volume : 95.4 fl  Mean Cell Hemoglobin : 31.1 pg  Mean Cell Hemoglobin Concentration : 32.6 gm/dL  Auto Neutrophil # : x  Auto Lymphocyte # : x  Auto Monocyte # : x  Auto Eosinophil # : x  Auto Basophil # : x  Auto Neutrophil % : x  Auto Lymphocyte % : x  Auto Monocyte % : x  Auto Eosinophil % : x  Auto Basophil % : x      11-08    136  |  105  |  24<H>  ----------------------------<  124<H>  4.0   |  19<L>  |  1.19    Ca    8.4      08 Nov 2019 07:15  Phos  2.5     11-07  Mg     2.0     11-07    TPro  6.3  /  Alb  3.2<L>  /  TBili  0.9  /  DBili  x   /  AST  103<H>  /  ALT  65<H>  /  AlkPhos  205<H>  11-07    CARDIAC MARKERS ( 07 Nov 2019 13:52 )  x     / x     / 353 U/L / x     / 15.9 ng/mL  CARDIAC MARKERS ( 07 Nov 2019 05:56 )  x     / x     / 475 U/L / x     / x      CARDIAC MARKERS ( 06 Nov 2019 17:29 )  x     / x     / 891 U/L / x     / x      -----------------------------------------------------------------------  Conclusions:  1. Normal left ventricular internal dimensions and wall  thicknesses.  2. Overall preserved left ventricular ejection fraction  despite segmental wall motion abnormalities. Endocardial  visualization enhanced with intravenous injection of  Ultrasonic Enhancing Agent (Definity). The basal  inferolateral wall, the basal anterolateral wall, and the  mid anterolateral wall are hypokinetic. The basal inferior  wall, and the mid inferior wall are akinetic.  3. Mild right atrial enlargement.  4. The right ventricle is not well visualized; grossly  Right ventricular enlargement with normal right ventricular  systolic function.  5. Estimated right ventricular systolic pressure equals 35  mm Hg, assuming right atrial pressure equals 8 mm Hg,  consistent with borderline pulmonary hypertension.  6. Normal tricuspid valve. Moderate tricuspid  regurgitation.  *** No previous Echo exam.  ------------------------------------------------------------------------  Confirmed on  11/6/2019 - 22:04:20 by Al Dominguez M.D.        RADIOLOGY & ADDITIONAL STUDIES:    IMPRESSION AND PLAN:    ALEX DE LEON is a 74 yo M PMH BPH, cocaine use who was transferred to [facility] for NSTEMI EKG with 0.5mm PANCHO in inferior leads and V1-V2 w/ STD laterally.  TTE with [facility].  He is for [facility] today, known hip ecchymosis for a prior fall over the weekend, but Hb/HCt stable.  Discussed with him the need for medical compliance and cocaine avoidance  for [facility] today   continue  aspirin enteric coated 81 milliGRAM(s) Oral daily  continue  atorvastatin 40 milliGRAM(s) Oral at bedtime  continue  clopidogrel Tablet 75 milliGRAM(s) Oral daily  influenza   Vaccine 0.5 milliLiter(s) IntraMuscular once  sodium chloride 0.9%. 1000 milliLiter(s) (60 mL/Hr) IV Continuous <Continuous>  hold bb at this time as he is bradycardic    Steven Dixon MD, PhD  Cardiology Attending  [facility]/ [facility]    For day time coverage Mon-Fri see Non-Service Consult Attending on [email or web address], password: [email or web address]; daytime weekends covered by general cardiology consult service attending.)

## 2019-11-08 NOTE — PROGRESS NOTE ADULT - PROBLEM SELECTOR PLAN 4
TTE with EF 55-60%. Normal right heart systolic fxn. Left ventricular akinetic with normal fxn. Mod TR. Borderline pul HTN.  - not on diuretics at home  - euvolemic on exam, monitor off diuretics

## 2019-11-08 NOTE — CHART NOTE - NSCHARTNOTEFT_GEN_A_CORE
Pt unable to lie flat for Cath procedure when Dr. Davion Wren, Cardiologist     Plan:  Lasix 40 IVP x 1 prior to procedure  Pt scheduled for R&L HC with Dr. Wren and Dr. James Bañuelos, [facility]  Cardiology

## 2019-11-09 DIAGNOSIS — R79.89 OTHER SPECIFIED ABNORMAL FINDINGS OF BLOOD CHEMISTRY: ICD-10-CM

## 2019-11-09 LAB
-  COAGULASE NEGATIVE STAPHYLOCOCCUS: SIGNIFICANT CHANGE UP
ALBUMIN SERPL ELPH-MCNC: 2.9 G/DL — LOW (ref 3.3–5)
ALP SERPL-CCNC: 218 U/L — HIGH (ref 40–120)
ALT FLD-CCNC: 51 U/L — HIGH (ref 10–45)
ANION GAP SERPL CALC-SCNC: 13 MMOL/L — SIGNIFICANT CHANGE UP (ref 5–17)
AST SERPL-CCNC: 50 U/L — HIGH (ref 10–40)
BILIRUB SERPL-MCNC: 0.5 MG/DL — SIGNIFICANT CHANGE UP (ref 0.2–1.2)
BUN SERPL-MCNC: 25 MG/DL — HIGH (ref 7–23)
CALCIUM SERPL-MCNC: 8.4 MG/DL — SIGNIFICANT CHANGE UP (ref 8.4–10.5)
CHLORIDE SERPL-SCNC: 104 MMOL/L — SIGNIFICANT CHANGE UP (ref 96–108)
CO2 SERPL-SCNC: 21 MMOL/L — LOW (ref 22–31)
CREAT SERPL-MCNC: 1.21 MG/DL — SIGNIFICANT CHANGE UP (ref 0.5–1.3)
FERRITIN SERPL-MCNC: 257 NG/ML — SIGNIFICANT CHANGE UP (ref 30–400)
GLUCOSE SERPL-MCNC: 153 MG/DL — HIGH (ref 70–99)
GRAM STN FLD: SIGNIFICANT CHANGE UP
HCT VFR BLD CALC: 31.2 % — LOW (ref 39–50)
HGB BLD-MCNC: 10.6 G/DL — LOW (ref 13–17)
IRON SATN MFR SERPL: 15 UG/DL — LOW (ref 45–165)
IRON SATN MFR SERPL: 7 % — LOW (ref 16–55)
MAGNESIUM SERPL-MCNC: 1.8 MG/DL — SIGNIFICANT CHANGE UP (ref 1.6–2.6)
MCHC RBC-ENTMCNC: 32 PG — SIGNIFICANT CHANGE UP (ref 27–34)
MCHC RBC-ENTMCNC: 34 GM/DL — SIGNIFICANT CHANGE UP (ref 32–36)
MCV RBC AUTO: 94.3 FL — SIGNIFICANT CHANGE UP (ref 80–100)
METHOD TYPE: SIGNIFICANT CHANGE UP
NRBC # BLD: 0 /100 WBCS — SIGNIFICANT CHANGE UP (ref 0–0)
PHOSPHATE SERPL-MCNC: 3.5 MG/DL — SIGNIFICANT CHANGE UP (ref 2.5–4.5)
PLATELET # BLD AUTO: 185 K/UL — SIGNIFICANT CHANGE UP (ref 150–400)
POTASSIUM SERPL-MCNC: 3.6 MMOL/L — SIGNIFICANT CHANGE UP (ref 3.5–5.3)
POTASSIUM SERPL-SCNC: 3.6 MMOL/L — SIGNIFICANT CHANGE UP (ref 3.5–5.3)
PROT SERPL-MCNC: 6.1 G/DL — SIGNIFICANT CHANGE UP (ref 6–8.3)
RBC # BLD: 3.31 M/UL — LOW (ref 4.2–5.8)
RBC # FLD: 13.2 % — SIGNIFICANT CHANGE UP (ref 10.3–14.5)
SODIUM SERPL-SCNC: 138 MMOL/L — SIGNIFICANT CHANGE UP (ref 135–145)
TIBC SERPL-MCNC: 220 UG/DL — SIGNIFICANT CHANGE UP (ref 220–430)
TSH SERPL-MCNC: 6.1 UIU/ML — HIGH (ref 0.27–4.2)
UIBC SERPL-MCNC: 205 UG/DL — SIGNIFICANT CHANGE UP (ref 110–370)
WBC # BLD: 9.28 K/UL — SIGNIFICANT CHANGE UP (ref 3.8–10.5)
WBC # FLD AUTO: 9.28 K/UL — SIGNIFICANT CHANGE UP (ref 3.8–10.5)

## 2019-11-09 PROCEDURE — 99233 SBSQ HOSP IP/OBS HIGH 50: CPT | Mod: GC

## 2019-11-09 PROCEDURE — 93010 ELECTROCARDIOGRAM REPORT: CPT

## 2019-11-09 PROCEDURE — 99233 SBSQ HOSP IP/OBS HIGH 50: CPT

## 2019-11-09 RX ORDER — METOPROLOL TARTRATE 50 MG
5 TABLET ORAL ONCE
Refills: 0 | Status: COMPLETED | OUTPATIENT
Start: 2019-11-09 | End: 2019-11-09

## 2019-11-09 RX ORDER — DILTIAZEM HCL 120 MG
10 CAPSULE, EXT RELEASE 24 HR ORAL ONCE
Refills: 0 | Status: COMPLETED | OUTPATIENT
Start: 2019-11-09 | End: 2019-11-09

## 2019-11-09 RX ORDER — METOPROLOL TARTRATE 50 MG
12.5 TABLET ORAL ONCE
Refills: 0 | Status: COMPLETED | OUTPATIENT
Start: 2019-11-09 | End: 2019-11-09

## 2019-11-09 RX ORDER — VANCOMYCIN HCL 1 G
1000 VIAL (EA) INTRAVENOUS ONCE
Refills: 0 | Status: COMPLETED | OUTPATIENT
Start: 2019-11-09 | End: 2019-11-09

## 2019-11-09 RX ORDER — SODIUM CHLORIDE 9 MG/ML
250 INJECTION, SOLUTION INTRAVENOUS ONCE
Refills: 0 | Status: COMPLETED | OUTPATIENT
Start: 2019-11-09 | End: 2019-11-09

## 2019-11-09 RX ADMIN — Medication 5 MILLIGRAM(S): at 03:44

## 2019-11-09 RX ADMIN — Medication 3 MILLILITER(S): at 23:11

## 2019-11-09 RX ADMIN — ATORVASTATIN CALCIUM 40 MILLIGRAM(S): 80 TABLET, FILM COATED ORAL at 21:38

## 2019-11-09 RX ADMIN — Medication 12.5 MILLIGRAM(S): at 03:57

## 2019-11-09 RX ADMIN — Medication 81 MILLIGRAM(S): at 12:49

## 2019-11-09 RX ADMIN — Medication 12.5 MILLIGRAM(S): at 01:54

## 2019-11-09 RX ADMIN — Medication 250 MILLIGRAM(S): at 15:09

## 2019-11-09 RX ADMIN — CEFTRIAXONE 100 MILLIGRAM(S): 500 INJECTION, POWDER, FOR SOLUTION INTRAMUSCULAR; INTRAVENOUS at 13:53

## 2019-11-09 RX ADMIN — SODIUM CHLORIDE 500 MILLILITER(S): 9 INJECTION, SOLUTION INTRAVENOUS at 06:48

## 2019-11-09 RX ADMIN — CLOPIDOGREL BISULFATE 75 MILLIGRAM(S): 75 TABLET, FILM COATED ORAL at 12:49

## 2019-11-09 RX ADMIN — Medication 10 MILLIGRAM(S): at 03:07

## 2019-11-09 RX ADMIN — Medication 3 MILLILITER(S): at 12:49

## 2019-11-09 RX ADMIN — Medication 3 MILLILITER(S): at 05:50

## 2019-11-09 NOTE — PROGRESS NOTE ADULT - PROBLEM SELECTOR PLAN 3
Hx of recurrent UTIs in the setting of BPH. OSH >100,000 GNR   - remains afebrile   - UCx NGTD likely as patient already on abx   - continue ceftriaxone 1g qd  - monitor for retention Patient with inc tachypnea to 33, hypoxia, pleuritic cp, and inc wob initially. CXR wnl.   - CXR without pulm edema, euvolemic on exam  - RVP negative  - duonebs PRN   - guaifenesin for cough   - CTM on NC and bipap as needed  - encourage incentive spirometry

## 2019-11-09 NOTE — PROGRESS NOTE ADULT - SUBJECTIVE AND OBJECTIVE BOX
Patient seen and examined at bedside.    Overnight Events: Patient was scheduled for R/[facility] 11/8 however procedure postponed due to inability to lie flat. Currently he denies chest pain, SOB. He states he typically sleeps in multiple positions and feels he would be able to lie flat. Overnight, required diltiazem for AF up to 140s, now in sinus shy.    Current Meds:  albuterol/ipratropium for Nebulization 3 milliLiter(s) Nebulizer every 6 hours  aspirin enteric coated 81 milliGRAM(s) Oral daily  atorvastatin 40 milliGRAM(s) Oral at bedtime  cefTRIAXone   IVPB 1000 milliGRAM(s) IV Intermittent every 24 hours  clopidogrel Tablet 75 milliGRAM(s) Oral daily  influenza   Vaccine 0.5 milliLiter(s) IntraMuscular once  sodium chloride 0.9%. 1000 milliLiter(s) IV Continuous <Continuous>      Vitals:  T(F): 97.8 (11-09), Max: 99.2 (11-09)  HR: 53 (11-09) (47 - 148)  BP: 99/64 (11-09) (70/34 - 146/81)  RR: 18 (11-09)  SpO2: 95% (11-09)  I&O's Summary    08 Nov 2019 07:01  -  09 Nov 2019 07:00  --------------------------------------------------------  IN: 370 mL / OUT: 2550 mL / NET: -2180 mL        Physical Exam:  Appearance: No acute distress; nasal cannula in place  HEENT:  EOMI, sclera anicteric, Normal oral mucosa  Cardiovascular: RRR, S1, S2, no murmurs, rubs, or gallops; no edema; no JVD  Respiratory: Clear to auscultation bilaterally, no wheezes, rales, rhonchi  Gastrointestinal: soft, non-tender, non-distended with normal bowel sounds  Musculoskeletal: No clubbing; no joint deformity   Neurologic: Non-focal  Lymphatic: No lymphadenopathy  Psychiatry: AAOx3, mood & affect appropriate  Skin: No rashes, ecchymoses, or cyanosis                          10.6   9.28  )-----------( 185      ( 09 Nov 2019 04:03 )             31.2     11-09    138  |  104  |  25<H>  ----------------------------<  153<H>  3.6   |  21<L>  |  1.21    Ca    8.4      09 Nov 2019 04:03  Phos  3.5     11-09  Mg     1.8     11-09    TPro  6.1  /  Alb  2.9<L>  /  TBili  0.5  /  DBili  x   /  AST  50<H>  /  ALT  51<H>  /  AlkPhos  218<H>  11-09    PT/INR - ( 08 Nov 2019 07:17 )   PT: 16.4 sec;   INR: 1.42 ratio         PTT - ( 08 Nov 2019 14:35 )  PTT:48.9 sec  CARDIAC MARKERS ( 07 Nov 2019 13:52 )  4847 ng/L / x     / x     / 353 U/L / x     / 15.9 ng/mL  CARDIAC MARKERS ( 07 Nov 2019 05:56 )  5294 ng/L / x     / x     / 475 U/L / x     / x      CARDIAC MARKERS ( 06 Nov 2019 17:29 )  3943 ng/L / x     / x     / 891 U/L / x     / x          Serum Pro-Brain Natriuretic Peptide: 3206 pg/mL (11-06 @ 17:29)      Echo:  11/6/19  Conclusions:  1. Normal left ventricular internal dimensions and wall  thicknesses.  2. Overall preserved left ventricular ejection fraction  despite segmental wall motion abnormalities. Endocardial  visualization enhanced with intravenous injection of  Ultrasonic Enhancing Agent (Definity). The basal  inferolateral wall, the basal anterolateral wall, and the  mid anterolateral wall are hypokinetic. The basal inferior  wall, and the mid inferior wall are akinetic.  3. Mild right atrial enlargement.  4. The right ventricle is not well visualized; grossly  Right ventricular enlargement with normal right ventricular  systolic function.  5. Estimated right ventricular systolic pressure equals 35  mm Hg, assuming right atrial pressure equals 8 mm Hg,  consistent with borderline pulmonary hypertension.  6. Normal tricuspid valve. Moderate tricuspid  regurgitation.  *** No previous Echo exam.    Interpretation of Telemetry: atrial flutter/fib up to 140s for 5 minutes then converted to sinus shy 40s-50s Patient seen and examined at bedside.    Overnight Events: Patient was scheduled for R/[facility] 11/8 however procedure postponed due to inability to lie flat. Currently he denies chest pain, SOB. He states he typically sleeps in multiple positions and feels he would be able to lie flat. Overnight, required diltiazem for AF up to 140s, now in sinus shy.    Current Meds:  albuterol/ipratropium for Nebulization 3 milliLiter(s) Nebulizer every 6 hours  aspirin enteric coated 81 milliGRAM(s) Oral daily  atorvastatin 40 milliGRAM(s) Oral at bedtime  cefTRIAXone   IVPB 1000 milliGRAM(s) IV Intermittent every 24 hours  clopidogrel Tablet 75 milliGRAM(s) Oral daily  influenza   Vaccine 0.5 milliLiter(s) IntraMuscular once  sodium chloride 0.9%. 1000 milliLiter(s) IV Continuous <Continuous>      Vitals:  T(F): 97.8 (11-09), Max: 99.2 (11-09)  HR: 53 (11-09) (47 - 148)  BP: 99/64 (11-09) (70/34 - 146/81)  RR: 18 (11-09)  SpO2: 95% (11-09)  I&O's Summary    08 Nov 2019 07:01  -  09 Nov 2019 07:00  --------------------------------------------------------  IN: 370 mL / OUT: 2550 mL / NET: -2180 mL        Physical Exam:  Appearance: No acute distress; nasal cannula in place  HEENT:  EOMI, sclera anicteric, Normal oral mucosa  Cardiovascular: RRR, S1, S2, no murmurs, rubs, or gallops; no edema; no JVD  Respiratory: diffuse wheezing  Gastrointestinal: soft, non-tender, non-distended with normal bowel sounds  Musculoskeletal: No clubbing; no joint deformity   Neurologic: Non-focal  Lymphatic: No lymphadenopathy  Psychiatry: AAOx3, mood & affect appropriate  Skin: No rashes, ecchymoses, or cyanosis                          10.6   9.28  )-----------( 185      ( 09 Nov 2019 04:03 )             31.2     11-09    138  |  104  |  25<H>  ----------------------------<  153<H>  3.6   |  21<L>  |  1.21    Ca    8.4      09 Nov 2019 04:03  Phos  3.5     11-09  Mg     1.8     11-09    TPro  6.1  /  Alb  2.9<L>  /  TBili  0.5  /  DBili  x   /  AST  50<H>  /  ALT  51<H>  /  AlkPhos  218<H>  11-09    PT/INR - ( 08 Nov 2019 07:17 )   PT: 16.4 sec;   INR: 1.42 ratio         PTT - ( 08 Nov 2019 14:35 )  PTT:48.9 sec  CARDIAC MARKERS ( 07 Nov 2019 13:52 )  4847 ng/L / x     / x     / 353 U/L / x     / 15.9 ng/mL  CARDIAC MARKERS ( 07 Nov 2019 05:56 )  5294 ng/L / x     / x     / 475 U/L / x     / x      CARDIAC MARKERS ( 06 Nov 2019 17:29 )  3943 ng/L / x     / x     / 891 U/L / x     / x          Serum Pro-Brain Natriuretic Peptide: 3206 pg/mL (11-06 @ 17:29)      Echo:  11/6/19  Conclusions:  1. Normal left ventricular internal dimensions and wall  thicknesses.  2. Overall preserved left ventricular ejection fraction  despite segmental wall motion abnormalities. Endocardial  visualization enhanced with intravenous injection of  Ultrasonic Enhancing Agent (Definity). The basal  inferolateral wall, the basal anterolateral wall, and the  mid anterolateral wall are hypokinetic. The basal inferior  wall, and the mid inferior wall are akinetic.  3. Mild right atrial enlargement.  4. The right ventricle is not well visualized; grossly  Right ventricular enlargement with normal right ventricular  systolic function.  5. Estimated right ventricular systolic pressure equals 35  mm Hg, assuming right atrial pressure equals 8 mm Hg,  consistent with borderline pulmonary hypertension.  6. Normal tricuspid valve. Moderate tricuspid  regurgitation.  *** No previous Echo exam.    Interpretation of Telemetry: atrial flutter/fib up to 140s for 5 minutes then converted to sinus shy 40s-50s Patient seen and examined at bedside.    Overnight Events: Patient was scheduled for R/[facility] 11/8 however procedure postponed due to inability to lie flat. Currently he denies chest pain, SOB. He states he typically sleeps in multiple positions and feels he would be able to lie flat. Overnight, required diltiazem for AF up to 140s, now in sinus shy.    Current Meds:  albuterol/ipratropium for Nebulization 3 milliLiter(s) Nebulizer every 6 hours  aspirin enteric coated 81 milliGRAM(s) Oral daily  atorvastatin 40 milliGRAM(s) Oral at bedtime  cefTRIAXone   IVPB 1000 milliGRAM(s) IV Intermittent every 24 hours  clopidogrel Tablet 75 milliGRAM(s) Oral daily  influenza   Vaccine 0.5 milliLiter(s) IntraMuscular once  sodium chloride 0.9%. 1000 milliLiter(s) IV Continuous <Continuous>      Vitals:  T(F): 97.8 (11-09), Max: 99.2 (11-09)  HR: 53 (11-09) (47 - 148)  BP: 99/64 (11-09) (70/34 - 146/81)  RR: 18 (11-09)  SpO2: 95% (11-09)  I&O's Summary    08 Nov 2019 07:01  -  09 Nov 2019 07:00  --------------------------------------------------------  IN: 370 mL / OUT: 2550 mL / NET: -2180 mL        Physical Exam:  Appearance: No acute distress; nasal cannula in place  HEENT:  EOMI, sclera anicteric, Normal oral mucosa  Cardiovascular: RRR, S1, S2, no murmurs, rubs, or gallops; no edema; no JVD  Respiratory: diffuse wheezing  Gastrointestinal: soft, non-tender, non-distended with normal bowel sounds  Musculoskeletal: No clubbing; no joint deformity   Neurologic: Non-focal  Lymphatic: No lymphadenopathy  Psychiatry: AAOx3, mood & affect appropriate  Skin: No rashes, ecchymoses, or cyanosis                          10.6   9.28  )-----------( 185      ( 09 Nov 2019 04:03 )             31.2     11-09    138  |  104  |  25<H>  ----------------------------<  153<H>  3.6   |  21<L>  |  1.21    Ca    8.4      09 Nov 2019 04:03  Phos  3.5     11-09  Mg     1.8     11-09    TPro  6.1  /  Alb  2.9<L>  /  TBili  0.5  /  DBili  x   /  AST  50<H>  /  ALT  51<H>  /  AlkPhos  218<H>  11-09    PT/INR - ( 08 Nov 2019 07:17 )   PT: 16.4 sec;   INR: 1.42 ratio         PTT - ( 08 Nov 2019 14:35 )  PTT:48.9 sec  CARDIAC MARKERS ( 07 Nov 2019 13:52 )  4847 ng/L / x     / x     / 353 U/L / x     / 15.9 ng/mL  CARDIAC MARKERS ( 07 Nov 2019 05:56 )  5294 ng/L / x     / x     / 475 U/L / x     / x      CARDIAC MARKERS ( 06 Nov 2019 17:29 )  3943 ng/L / x     / x     / 891 U/L / x     / x        Labs Personally Reviewed 11/9/2019      Serum Pro-Brain Natriuretic Peptide: 3206 pg/mL (11-06 @ 17:29)      Echo:  11/6/19  Conclusions:  1. Normal left ventricular internal dimensions and wall  thicknesses.  2. Overall preserved left ventricular ejection fraction  despite segmental wall motion abnormalities. Endocardial  visualization enhanced with intravenous injection of  Ultrasonic Enhancing Agent (Definity). The basal  inferolateral wall, the basal anterolateral wall, and the  mid anterolateral wall are hypokinetic. The basal inferior  wall, and the mid inferior wall are akinetic.  3. Mild right atrial enlargement.  4. The right ventricle is not well visualized; grossly  Right ventricular enlargement with normal right ventricular  systolic function.  5. Estimated right ventricular systolic pressure equals 35  mm Hg, assuming right atrial pressure equals 8 mm Hg,  consistent with borderline pulmonary hypertension.  6. Normal tricuspid valve. Moderate tricuspid  regurgitation.  *** No previous Echo exam.    Interpretation of Telemetry: atrial flutter/fib up to 140s for 5 minutes then converted to sinus shy 40s-50s

## 2019-11-09 NOTE — PROGRESS NOTE ADULT - ASSESSMENT
74 yo M with a PMH significant for BPH, cocaine use who presented to [facility] after fall, found to be hypotensive requiring pressor support, transferred to [facility] for R/LHC.     #NSTEMI  TTE with "basal inferolateral wall, the basal anterolateral wall, and the mid anterolateral wall are hypokinetic. The basal inferior wall, and the mid inferior wall are akinetic."  - continue ASA 81mg daily, plavix 75mg daily, atorvastatin 40mg qhs  - plan for R/LHC Monday  - appears euvolemic at this time  - currently off BB given bradycardia and cocaine use    #AF  Brief run of AF overnight. CHADSVASC 1.  - continue to monitor    Chaz Cleveland MD  Cardiology Fellow  [phone]  All Cardiology service information can be found 24/7 on [email or web address], password: [email or web address] 72 yo M with a PMH significant for BPH, cocaine use who presented to [facility] after fall, found to be hypotensive requiring pressor support, transferred to [facility] for R/LHC.     #NSTEMI  TTE with "basal inferolateral wall, the basal anterolateral wall, and the mid anterolateral wall are hypokinetic. The basal inferior wall, and the mid inferior wall are akinetic."  - continue ASA 81mg daily, plavix 75mg daily, atorvastatin 40mg qhs  - plan for R/LHC Monday  - start lasix 40mg IV daily  - currently off BB given bradycardia and cocaine use    #AF  Brief run of AF overnight. CHADSVASC 1.  - continue to monitor    Chaz Cleveland MD  Cardiology Fellow  [phone]  All Cardiology service information can be found 24/7 on [email or web address], password: [email or web address]

## 2019-11-09 NOTE — PROGRESS NOTE ADULT - ASSESSMENT
73 yr old Male with PMHx of BPH, cocaine use, recurrent UTI([facility] hospitalization in 5/2019 for UTI/Sepsis), MRSA Bacteremia 2017 presented to ED from [facility] 11/4/19 after a fall, admitted for NSTEMI transferred to [facility] for R/LHC course c/b AHRF

## 2019-11-09 NOTE — PROGRESS NOTE ADULT - PROBLEM SELECTOR PLAN 1
HS trops elevated  3949 --> 5295 --> 4847. EKG at OSH sig for 0.5 ST elevation in inf leads and ST dep in V1-V2. New EKG showing nonspecific changes. r/o NSTEMI. 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   - c/w asa, clopidogrel 75 mg   - c/w atorvastatin 40 mg  - holding BB in setting of active cocaine use to prevent unchecked alpha activity   - holding lasix given likely right-sided deficit   - c/w heparin gtt  - cards recs appreciated: plan for L/RHC once patient afebrile GPC in clusters. In 1 bottle likely a contaminant.  - Will give 1g IV x 1 now  - Repeat blood cultures drawn  - Awaiting sensitivities

## 2019-11-09 NOTE — PROGRESS NOTE ADULT - ATTENDING COMMENTS
BC from Nov 7th with coag neg staph - contaminant.  Continue with ceftriaxone for UTI. BC from Nov 7th with coag neg staph - contaminant.  Continue with ceftriaxone for UTI.  Holding lasix for today given soft BPs, can resume tomorrow if BPs are better

## 2019-11-09 NOTE — PROGRESS NOTE ADULT - PROBLEM SELECTOR PLAN 7
Bladder obstruction on imaging from OSH noted, difficulty placing farnsworth   - strict Is and Os  - bladder scan as needed

## 2019-11-09 NOTE — PROGRESS NOTE ADULT - SUBJECTIVE AND OBJECTIVE BOX
Nellairenesiena Allison, PGY2  Pager: [phone]    CHIEF COMPLAINT: Patient is a 73y old  Male who presents with a chief complaint of STEMI (08 Nov 2019 16:42)      INTERVAL HPI/OVERNIGHT EVENTS:    MEDICATIONS (STANDING):  albuterol/ipratropium for Nebulization 3 milliLiter(s) Nebulizer every 6 hours  aspirin enteric coated 81 milliGRAM(s) Oral daily  atorvastatin 40 milliGRAM(s) Oral at bedtime  cefTRIAXone   IVPB 1000 milliGRAM(s) IV Intermittent every 24 hours  clopidogrel Tablet 75 milliGRAM(s) Oral daily  influenza   Vaccine 0.5 milliLiter(s) IntraMuscular once  sodium chloride 0.9%. 1000 milliLiter(s) IV Continuous <Continuous>    MEDICATIONS  (PRN):      REVIEW OF SYSTEMS:  CONSTITUTIONAL: No fever, weight loss, or fatigue  EYES: No eye pain, visual disturbances, or discharge  ENMT:  No difficulty hearing, tinnitus, vertigo; No sinus or throat pain  NECK: No pain or stiffness  RESPIRATORY: No cough, wheezing, chills or hemoptysis; No shortness of breath  CARDIOVASCULAR: No chest pain, palpitations, dizziness, or leg swelling  GASTROINTESTINAL: No abdominal or epigastric pain. No nausea, vomiting, or hematemesis; No diarrhea or constipation. No melena or hematochezia.  GENITOURINARY: No dysuria, frequency, hematuria, or incontinence  NEUROLOGICAL: No headaches, memory loss, loss of strength, numbness, or tremors  SKIN: No itching, burning, rashes, or lesions   MUSCULOSKELETAL: No joint pain or swelling; No muscle, back, or extremity pain    T(F): 97.8 (11-09-19 @ 04:27), Max: 99.2 (11-09-19 @ 03:23)  HR: 47 (11-09-19 @ 06:25) (47 - 148)  BP: 88/56 (11-09-19 @ 06:25) (70/34 - 146/81)  RR: 18 (11-09-19 @ 04:27) (18 - 19)  SpO2: 95% (11-09-19 @ 04:27) (93% - 100%)  Wt(kg): --  CAPILLARY BLOOD GLUCOSE        I&O's Summary    07 Nov 2019 07:01  -  08 Nov 2019 07:00  --------------------------------------------------------  IN: 480 mL / OUT: 400 mL / NET: 80 mL    08 Nov 2019 07:01  -  09 Nov 2019 06:46  --------------------------------------------------------  IN: 120 mL / OUT: 2550 mL / NET: -2430 mL        PHYSICAL EXAM:  GENERAL: NAD, well-groomed, well-developed  HEAD:  Atraumatic, Normocephalic  EYES: EOMI, PERRLA, conjunctiva and sclera clear  ENMT: No tonsillar erythema, exudates, or enlargement; Moist mucous membranes  NECK: Supple, No JVD, Normal thyroid  NERVOUS SYSTEM:  Alert & Oriented X3, Good concentration; Motor Strength 5/5 B/L upper and lower extremities  CHEST/LUNG: Clear to auscultation bilaterally; No rales, rhonchi, wheezing, or rubs  HEART: Regular rate and rhythm; No murmurs, rubs, or gallops  ABDOMEN: Soft, Nontender, Nondistended; Bowel sounds present  EXTREMITIES:  2+ Peripheral Pulses, No clubbing, cyanosis, or edema  LYMPH: No lymphadenopathy noted  SKIN: No rashes or lesions    LABS:                        10.6   9.28  )-----------( 185      ( 09 Nov 2019 04:03 )             31.2     11-09    138  |  104  |  25<H>  ----------------------------<  153<H>  3.6   |  21<L>  |  1.21    Ca    8.4      09 Nov 2019 04:03  Phos  3.5     11-09  Mg     1.8     11-09    TPro  6.1  /  Alb  2.9<L>  /  TBili  0.5  /  DBili  x   /  AST  50<H>  /  ALT  51<H>  /  AlkPhos  218<H>  11-09    PT/INR - ( 08 Nov 2019 07:17 )   PT: 16.4 sec;   INR: 1.42 ratio         PTT - ( 08 Nov 2019 14:35 )  PTT:48.9 sec        RADIOLOGY & ADDITIONAL TESTS: Igor Estefany, PGY2  Pager: [phone]    CHIEF COMPLAINT: Patient is a 73y old  Male who presents with a chief complaint of STEMI (08 Nov 2019 16:42)      INTERVAL HPI/OVERNIGHT EVENTS:  Had HR in 140s overnight. Received Metoprolol 5IVP , PO 12.5 x 2 and Diltiazem. HR in 40s sinus shy. Patient seen at bedside. Asymptomatic. No headache, LH or fatigue. No shortness of breath, chest pain or abdominal pain.     MEDICATIONS (STANDING):  albuterol/ipratropium for Nebulization 3 milliLiter(s) Nebulizer every 6 hours  aspirin enteric coated 81 milliGRAM(s) Oral daily  atorvastatin 40 milliGRAM(s) Oral at bedtime  cefTRIAXone   IVPB 1000 milliGRAM(s) IV Intermittent every 24 hours  clopidogrel Tablet 75 milliGRAM(s) Oral daily  influenza   Vaccine 0.5 milliLiter(s) IntraMuscular once  sodium chloride 0.9%. 1000 milliLiter(s) IV Continuous <Continuous>    MEDICATIONS  (PRN):    T(F): 97.8 (11-09-19 @ 04:27), Max: 99.2 (11-09-19 @ 03:23)  HR: 47 (11-09-19 @ 06:25) (47 - 148)  BP: 88/56 (11-09-19 @ 06:25) (70/34 - 146/81)  RR: 18 (11-09-19 @ 04:27) (18 - 19)  SpO2: 95% (11-09-19 @ 04:27) (93% - 100%)  Wt(kg): --  CAPILLARY BLOOD GLUCOSE        I&O's Summary    07 Nov 2019 07:01  -  08 Nov 2019 07:00  --------------------------------------------------------  IN: 480 mL / OUT: 400 mL / NET: 80 mL    08 Nov 2019 07:01  -  09 Nov 2019 06:46  --------------------------------------------------------  IN: 120 mL / OUT: 2550 mL / NET: -2430 mL        Physical exam:  General: elderly male in NAD   HEENT: 2x2 cm ecchymosis on left frontotemporal scalp s/p fall, pupils equal, pinpt, slightly reactive to light   Lungs: mild bilateral crackles, no wheezing on exam, not using accessory mm to breathe on 2L of NC  Cardiac: RRR, no murmurs  Abdomen: soft, nondistended, normoactive, no suprapubic tenderness, no rebound, guarding, rigidity, negative CVA bl  Neurological: A&O x 3, strength 5/5 upper and lower extremities with gross sense of touch intact   MSK: not accessed   Skin: large ecchymoses at left lateral hip, left inguinal region at prior fem cath site, no indurance suggestive of hematoma  Extremity: Trace LE edema    LABS:                        10.6   9.28  )-----------( 185      ( 09 Nov 2019 04:03 )             31.2     11-09    138  |  104  |  25<H>  ----------------------------<  153<H>  3.6   |  21<L>  |  1.21    Ca    8.4      09 Nov 2019 04:03  Phos  3.5     11-09  Mg     1.8     11-09    TPro  6.1  /  Alb  2.9<L>  /  TBili  0.5  /  DBili  x   /  AST  50<H>  /  ALT  51<H>  /  AlkPhos  218<H>  11-09    PT/INR - ( 08 Nov 2019 07:17 )   PT: 16.4 sec;   INR: 1.42 ratio         PTT - ( 08 Nov 2019 14:35 )  PTT:48.9 sec        RADIOLOGY & ADDITIONAL TESTS:

## 2019-11-09 NOTE — PROGRESS NOTE ADULT - PROBLEM SELECTOR PLAN 6
Transaminitis likely transient 2/2 cardiac injury from NSTEMI   - US abdomen negative for PVT  - CTM Not on AC at home. Alb normal/low   - likely transient from cardiac injury

## 2019-11-09 NOTE — PROGRESS NOTE ADULT - PROBLEM SELECTOR PLAN 5
Not on AC at home. Alb normal/low   - likely transient from cardiac injury TTE with EF 55-60%. Normal right heart systolic fxn. Left ventricular akinetic with normal fxn. Mod TR. Borderline pul HTN.  - not on diuretics at home  - euvolemic on exam, monitor off diuretics

## 2019-11-09 NOTE — PROGRESS NOTE ADULT - PROBLEM SELECTOR PLAN 2
Patient with inc tachypnea to 33, hypoxia, pleuritic cp, and inc wob initially. CXR wnl.   - CXR without pulm edema, euvolemic on exam  - RVP negative  - duonebs PRN   - guaifenesin for cough   - CTM on NC and bipap as needed  - encourage incentive spirometry HS trops elevated  3949 --> 5295 --> 4847. EKG at OSH sig for 0.5 ST elevation in inf leads and ST dep in V1-V2. New EKG showing nonspecific changes. r/o NSTEMI. 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   - c/w asa, clopidogrel 75 mg   - c/w atorvastatin 40 mg  - holding BB in setting of active cocaine use to prevent unchecked alpha activity   - holding lasix (due to low BP) given likely right-sided deficit   - holding heparin gtt as per cards  - cards recs appreciated: plan for L/RHC once patient afebrile

## 2019-11-10 LAB
ALBUMIN SERPL ELPH-MCNC: 3.1 G/DL — LOW (ref 3.3–5)
ALP SERPL-CCNC: 215 U/L — HIGH (ref 40–120)
ALT FLD-CCNC: 69 U/L — HIGH (ref 10–45)
ANION GAP SERPL CALC-SCNC: 14 MMOL/L — SIGNIFICANT CHANGE UP (ref 5–17)
AST SERPL-CCNC: 65 U/L — HIGH (ref 10–40)
BASOPHILS # BLD AUTO: 0.04 K/UL — SIGNIFICANT CHANGE UP (ref 0–0.2)
BASOPHILS NFR BLD AUTO: 0.4 % — SIGNIFICANT CHANGE UP (ref 0–2)
BILIRUB SERPL-MCNC: 0.7 MG/DL — SIGNIFICANT CHANGE UP (ref 0.2–1.2)
BUN SERPL-MCNC: 26 MG/DL — HIGH (ref 7–23)
CALCIUM SERPL-MCNC: 8.5 MG/DL — SIGNIFICANT CHANGE UP (ref 8.4–10.5)
CHLORIDE SERPL-SCNC: 99 MMOL/L — SIGNIFICANT CHANGE UP (ref 96–108)
CO2 SERPL-SCNC: 22 MMOL/L — SIGNIFICANT CHANGE UP (ref 22–31)
CREAT SERPL-MCNC: 1.22 MG/DL — SIGNIFICANT CHANGE UP (ref 0.5–1.3)
CULTURE RESULTS: SIGNIFICANT CHANGE UP
EOSINOPHIL # BLD AUTO: 0.06 K/UL — SIGNIFICANT CHANGE UP (ref 0–0.5)
EOSINOPHIL NFR BLD AUTO: 0.6 % — SIGNIFICANT CHANGE UP (ref 0–6)
GLUCOSE SERPL-MCNC: 131 MG/DL — HIGH (ref 70–99)
HCT VFR BLD CALC: 29.4 % — LOW (ref 39–50)
HGB BLD-MCNC: 9.7 G/DL — LOW (ref 13–17)
IMM GRANULOCYTES NFR BLD AUTO: 0.8 % — SIGNIFICANT CHANGE UP (ref 0–1.5)
LYMPHOCYTES # BLD AUTO: 1.49 K/UL — SIGNIFICANT CHANGE UP (ref 1–3.3)
LYMPHOCYTES # BLD AUTO: 15.8 % — SIGNIFICANT CHANGE UP (ref 13–44)
MAGNESIUM SERPL-MCNC: 1.8 MG/DL — SIGNIFICANT CHANGE UP (ref 1.6–2.6)
MCHC RBC-ENTMCNC: 31.4 PG — SIGNIFICANT CHANGE UP (ref 27–34)
MCHC RBC-ENTMCNC: 33 GM/DL — SIGNIFICANT CHANGE UP (ref 32–36)
MCV RBC AUTO: 95.1 FL — SIGNIFICANT CHANGE UP (ref 80–100)
MONOCYTES # BLD AUTO: 0.95 K/UL — HIGH (ref 0–0.9)
MONOCYTES NFR BLD AUTO: 10.1 % — SIGNIFICANT CHANGE UP (ref 2–14)
NEUTROPHILS # BLD AUTO: 6.82 K/UL — SIGNIFICANT CHANGE UP (ref 1.8–7.4)
NEUTROPHILS NFR BLD AUTO: 72.3 % — SIGNIFICANT CHANGE UP (ref 43–77)
ORGANISM # SPEC MICROSCOPIC CNT: SIGNIFICANT CHANGE UP
ORGANISM # SPEC MICROSCOPIC CNT: SIGNIFICANT CHANGE UP
PHOSPHATE SERPL-MCNC: 3.8 MG/DL — SIGNIFICANT CHANGE UP (ref 2.5–4.5)
PLATELET # BLD AUTO: 225 K/UL — SIGNIFICANT CHANGE UP (ref 150–400)
POTASSIUM SERPL-MCNC: 3.8 MMOL/L — SIGNIFICANT CHANGE UP (ref 3.5–5.3)
POTASSIUM SERPL-SCNC: 3.8 MMOL/L — SIGNIFICANT CHANGE UP (ref 3.5–5.3)
PROT SERPL-MCNC: 6 G/DL — SIGNIFICANT CHANGE UP (ref 6–8.3)
RBC # BLD: 3.09 M/UL — LOW (ref 4.2–5.8)
RBC # FLD: 13.2 % — SIGNIFICANT CHANGE UP (ref 10.3–14.5)
SODIUM SERPL-SCNC: 135 MMOL/L — SIGNIFICANT CHANGE UP (ref 135–145)
SPECIMEN SOURCE: SIGNIFICANT CHANGE UP
WBC # BLD: 9.44 K/UL — SIGNIFICANT CHANGE UP (ref 3.8–10.5)
WBC # FLD AUTO: 9.44 K/UL — SIGNIFICANT CHANGE UP (ref 3.8–10.5)

## 2019-11-10 PROCEDURE — 99233 SBSQ HOSP IP/OBS HIGH 50: CPT | Mod: GC

## 2019-11-10 PROCEDURE — 99233 SBSQ HOSP IP/OBS HIGH 50: CPT

## 2019-11-10 RX ORDER — FUROSEMIDE 40 MG
40 TABLET ORAL ONCE
Refills: 0 | Status: COMPLETED | OUTPATIENT
Start: 2019-11-10 | End: 2019-11-10

## 2019-11-10 RX ADMIN — Medication 3 MILLILITER(S): at 17:26

## 2019-11-10 RX ADMIN — Medication 81 MILLIGRAM(S): at 11:55

## 2019-11-10 RX ADMIN — Medication 3 MILLILITER(S): at 05:19

## 2019-11-10 RX ADMIN — CLOPIDOGREL BISULFATE 75 MILLIGRAM(S): 75 TABLET, FILM COATED ORAL at 11:55

## 2019-11-10 RX ADMIN — ATORVASTATIN CALCIUM 40 MILLIGRAM(S): 80 TABLET, FILM COATED ORAL at 22:01

## 2019-11-10 RX ADMIN — Medication 40 MILLIGRAM(S): at 11:55

## 2019-11-10 RX ADMIN — Medication 3 MILLILITER(S): at 22:02

## 2019-11-10 RX ADMIN — Medication 3 MILLILITER(S): at 11:55

## 2019-11-10 NOTE — PROGRESS NOTE ADULT - PROBLEM SELECTOR PLAN 3
Patient with inc tachypnea to 33, hypoxia, pleuritic cp, and inc wob initially. CXR wnl.   - CXR without pulm edema, euvolemic on exam  - RVP negative  - duonebs PRN   - guaifenesin for cough   - CTM on NC and bipap as needed  - encourage incentive spirometry

## 2019-11-10 NOTE — PROGRESS NOTE ADULT - PROBLEM SELECTOR PLAN 4
Hx of recurrent UTIs in the setting of BPH. OSH >100,000 GNR   - remains afebrile   - UCx NGTD likely as patient already on abx   - continue ceftriaxone 1g qd (11/6-)  - monitor for retention Hx of recurrent UTIs in the setting of BPH. OSH >100,000 GNR   - remains afebrile   - ceftriaxone 1g qd (11/6-11/10, 11/4- from OSH); will d/c as uncomplicated UTI tx completed    - UCx NGTD likely as patient already on abx   - monitor for retention

## 2019-11-10 NOTE — PROGRESS NOTE ADULT - ASSESSMENT
73 yr old Male with PMHx of BPH, cocaine use, recurrent UTI([facility] hospitalization in 5/2019 for UTI/Sepsis), MRSA Bacteremia 2017 presented to ED from [facility] 11/4/19 after a fall, admitted for NSTEMI transferred to [facility] for R/LHC course c/b AHRF and new afib 73 yr old Male with PMHx of BPH, cocaine use, recurrent UTI([facility] hospitalization in 5/2019 for UTI/Sepsis), MRSA Bacteremia 2017 presented to ED from [facility] 11/4/19 after a fall, admitted for NSTEMI transferred to [facility] for R/LHC course c/b AHRF and new paraoxysmal afib

## 2019-11-10 NOTE — PROGRESS NOTE ADULT - SUBJECTIVE AND OBJECTIVE BOX
***************************************************************  Dr. Josefina Melissa  Internal Medicine   [facility] Pager: [phone]  [facility] Pager: [phone]   ***************************************************************    ALEX DE LEON  73y  MRN: [id number]    Subjective:    Patient is a 73y old  Male who presents with a chief complaint of STEMI (09 Nov 2019 11:25)      Interval history/overnight events:    KATERIN ON.       MEDICATIONS  (STANDING):  albuterol/ipratropium for Nebulization 3 milliLiter(s) Nebulizer every 6 hours  aspirin enteric coated 81 milliGRAM(s) Oral daily  atorvastatin 40 milliGRAM(s) Oral at bedtime  cefTRIAXone   IVPB 1000 milliGRAM(s) IV Intermittent every 24 hours  clopidogrel Tablet 75 milliGRAM(s) Oral daily  influenza   Vaccine 0.5 milliLiter(s) IntraMuscular once  sodium chloride 0.9%. 1000 milliLiter(s) (60 mL/Hr) IV Continuous <Continuous>    MEDICATIONS  (PRN):        Objective:    Vitals: Vital Signs Last 24 Hrs  T(C): 36.7 (11-10-19 @ 04:55), Max: 37.4 (11-09-19 @ 21:10)  T(F): 98 (11-10-19 @ 04:55), Max: 99.3 (11-09-19 @ 21:10)  HR: 58 (11-10-19 @ 04:55) (53 - 64)  BP: 113/65 (11-10-19 @ 04:55) (95/62 - 130/73)  BP(mean): --  RR: 18 (11-10-19 @ 04:55) (18 - 18)  SpO2: 92% (11-10-19 @ 04:55) (91% - 98%)            I&O's Summary    09 Nov 2019 07:01  -  10 Nov 2019 07:00  --------------------------------------------------------  IN: 360 mL / OUT: 675 mL / NET: -315 mL    LABS:    11-10    135  |  99  |  26<H>  ----------------------------<  131<H>  3.8   |  22  |  1.22  11-09    138  |  104  |  25<H>  ----------------------------<  153<H>  3.6   |  21<L>  |  1.21  11-08    136  |  105  |  24<H>  ----------------------------<  124<H>  4.0   |  19<L>  |  1.19    Ca    8.5      10 Nov 2019 06:04  Ca    8.4      09 Nov 2019 04:03  Ca    8.4      08 Nov 2019 07:15  Phos  3.8     11-10  Mg     1.8     11-10    TPro  6.0  /  Alb  3.1<L>  /  TBili  0.7  /  DBili  x   /  AST  65<H>  /  ALT  69<H>  /  AlkPhos  215<H>  11-10  TPro  6.1  /  Alb  2.9<L>  /  TBili  0.5  /  DBili  x   /  AST  50<H>  /  ALT  51<H>  /  AlkPhos  218<H>  11-09    PTT - ( 08 Nov 2019 14:35 )  PTT:48.9 sec                                        10.6   9.28  )-----------( 185      ( 09 Nov 2019 04:03 )             31.2                         9.4    8.68  )-----------( 145      ( 08 Nov 2019 07:19 )             28.8                         9.0    8.63  )-----------( 120      ( 08 Nov 2019 00:08 )             26.7     CAPILLARY BLOOD GLUCOSE              RADIOLOGY & ADDITIONAL TESTS:            Imaging Personally Reviewed:  [ ] YES  [ ] NO    Consultants involved in case:   Consultant(s) Notes Reviewed:  [ ] YES  [ ] NO:   Care Discussed with Consultants/Other Providers [ ] YES  [ ] NO ***************************************************************  Dr. Josefina Melissa  Internal Medicine   [facility] Pager: [phone]  [facility] Pager: [phone]   ***************************************************************    ALEX DE LEON  73y  MRN: [id number]    Subjective:    Patient is a 73y old  Male who presents with a chief complaint of STEMI (09 Nov 2019 11:25)      Interval history/overnight events:    KATERIN ON. Tele sinus-sinus shy. Denies CP, SOB, heart palpitations, abdominal pain, dysuria.       MEDICATIONS  (STANDING):  albuterol/ipratropium for Nebulization 3 milliLiter(s) Nebulizer every 6 hours  aspirin enteric coated 81 milliGRAM(s) Oral daily  atorvastatin 40 milliGRAM(s) Oral at bedtime  cefTRIAXone   IVPB 1000 milliGRAM(s) IV Intermittent every 24 hours  clopidogrel Tablet 75 milliGRAM(s) Oral daily  influenza   Vaccine 0.5 milliLiter(s) IntraMuscular once  sodium chloride 0.9%. 1000 milliLiter(s) (60 mL/Hr) IV Continuous <Continuous>    MEDICATIONS  (PRN):    Objective:    Vitals: Vital Signs Last 24 Hrs  T(C): 36.7 (11-10-19 @ 04:55), Max: 37.4 (11-09-19 @ 21:10)  T(F): 98 (11-10-19 @ 04:55), Max: 99.3 (11-09-19 @ 21:10)  HR: 58 (11-10-19 @ 04:55) (53 - 64)  BP: 113/65 (11-10-19 @ 04:55) (95/62 - 130/73)  BP(mean): --  RR: 18 (11-10-19 @ 04:55) (18 - 18)  SpO2: 92% (11-10-19 @ 04:55) (91% - 98%)            I&O's Summary    09 Nov 2019 07:01  -  10 Nov 2019 07:00  --------------------------------------------------------  IN: 360 mL / OUT: 675 mL / NET: -315 mL    Physical exam:  General: elderly male in NAD respiring on 4L lying in bed in NAD   HEENT: 2x2 cm ecchymosis on left frontotemporal scalp s/p fall, pupils equal, pinpt, slightly reactive to light   Lungs: lungs CTAB, no wheezing, not using accessory mm to breathe   Cardiac: RRR, no murmurs, gallops, rubs, -JVD, -HJR   Abdomen: soft, nondistended, normoactive, no suprapubic tenderness, no rebound, guarding, rigidity, negative CVA bl  Neurological: A&O x 3, strength 5/5 upper and lower extremities with gross sense of touch intact   MSK: not accessed   Skin: large ecchymoses at left lateral hip, left inguinal region at prior fem cath site, no indurance suggestive of hematoma  Extremity: non-pitting edema noted bl; LLE PT, DP pulses 2+ bl, RRP 2+   : deferred       LABS:    11-10    135  |  99  |  26<H>  ----------------------------<  131<H>  3.8   |  22  |  1.22  11-09    138  |  104  |  25<H>  ----------------------------<  153<H>  3.6   |  21<L>  |  1.21  11-08    136  |  105  |  24<H>  ----------------------------<  124<H>  4.0   |  19<L>  |  1.19    Ca    8.5      10 Nov 2019 06:04  Ca    8.4      09 Nov 2019 04:03  Ca    8.4      08 Nov 2019 07:15  Phos  3.8     11-10  Mg     1.8     11-10    TPro  6.0  /  Alb  3.1<L>  /  TBili  0.7  /  DBili  x   /  AST  65<H>  /  ALT  69<H>  /  AlkPhos  215<H>  11-10  TPro  6.1  /  Alb  2.9<L>  /  TBili  0.5  /  DBili  x   /  AST  50<H>  /  ALT  51<H>  /  AlkPhos  218<H>  11-09    PTT - ( 08 Nov 2019 14:35 )  PTT:48.9 sec                                        10.6   9.28  )-----------( 185      ( 09 Nov 2019 04:03 )             31.2                         9.4    8.68  )-----------( 145      ( 08 Nov 2019 07:19 )             28.8                         9.0    8.63  )-----------( 120      ( 08 Nov 2019 00:08 )             26.7     CAPILLARY BLOOD GLUCOSE              RADIOLOGY & ADDITIONAL TESTS:            Imaging Personally Reviewed:  [ ] YES  [ ] NO    Consultants involved in case:   Consultant(s) Notes Reviewed:  [ ] YES  [ ] NO:   Care Discussed with Consultants/Other Providers [ ] YES  [ ] NO

## 2019-11-10 NOTE — PROGRESS NOTE ADULT - PROBLEM SELECTOR PLAN 2
HS trops elevated  3949 --> 5295 --> 4847. EKG at OSH sig for 0.5 ST elevation in inf leads and ST dep in V1-V2. New EKG showing nonspecific changes. r/o NSTEMI. 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   - c/w asa, clopidogrel 75 mg   - c/w atorvastatin 40 mg  - c/w lasix 40 mg IV  - holding BB in setting of active cocaine use to prevent unchecked alpha activity HS trops elevated  3949 --> 5295 --> 4847. EKG at OSH sig for 0.5 ST elevation in inf leads and ST dep in V1-V2. New EKG showing nonspecific changes. r/o NSTEMI. 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   - c/w asa, clopidogrel 75 mg   - c/w atorvastatin 40 mg  - cardiology recs apprecaited: c/w lasix 40 mg IV x 1 dose and then d/c  - holding BB in setting of active cocaine use to prevent unchecked alpha activity

## 2019-11-10 NOTE — PROGRESS NOTE ADULT - ASSESSMENT
72 yo M with a PMH significant for BPH, cocaine use who presented to [facility] after fall, found to be hypotensive requiring pressor support, transferred to [facility] for R/LHC.     #NSTEMI  TTE with "basal inferolateral wall, the basal anterolateral wall, and the mid anterolateral wall are hypokinetic. The basal inferior wall, and the mid inferior wall are akinetic."  - continue ASA 81mg daily, plavix 75mg daily, atorvastatin 40mg qhs  - plan for R/LHC Monday  - start lasix 40mg IV daily  - currently off BB given bradycardia and cocaine use    #AF  Brief run of AF overnight. CHADSVASC 1.  - continue to monitor    Ted Foster M.D.  Cardiology Fellow 73M with BPH and cocaine use who presented to [facility] after fall, found to be hypotensive requiring pressor support, transferred to [facility] for R/LHC.     #NSTEMI  - TTE with basal inferolateral, basal-mid anterolateral wall are hypokinetic, basal-mid inferior wall are akinetic  - ASA 81mg daily, Plavix 75mg daily, Atorvastatin 40mg qhs  - plan for R/LHC Monday  - start lasix 40mg IV daily  - currently off BB given bradycardia and cocaine use    #Atrial Fibrillation, CHADSVASC=1  - currently SB, brief run of AF overnight on 11/9 with spontaneous conversion to SR  - continue to monitor    Ted Foster M.D.  Cardiology Fellow 73M with BPH and cocaine use a/w NSTEMI s/p LHC (oLAD 50%, mLAD with myocardial bridging, RCA ).     #NSTEMI  - ASA 81mg daily, Plavix 75mg daily for 1 year, then ASA 81mg lifelong  - continue Atorvastatin 40mg qhs  - Lasix 40mg IV x 1 this afternoon, then can discontinue  - No BB given bradycardia and cocaine use  - No further cardiac work-up, need outpatient cardiology follow-up    #Atrial Fibrillation, CHADSVASC=1  - currently SB, brief run of AF overnight on 11/9 with spontaneous conversion to SR  - continue to monitor    Ted Foster M.D.  Cardiology Fellow

## 2019-11-11 ENCOUNTER — TRANSCRIPTION ENCOUNTER (OUTPATIENT)
Age: 74
End: 2019-11-11

## 2019-11-11 DIAGNOSIS — J96.00 ACUTE RESPIRATORY FAILURE, UNSPECIFIED WHETHER WITH HYPOXIA OR HYPERCAPNIA: ICD-10-CM

## 2019-11-11 DIAGNOSIS — I48.91 UNSPECIFIED ATRIAL FIBRILLATION: ICD-10-CM

## 2019-11-11 LAB
ALBUMIN SERPL ELPH-MCNC: 2.9 G/DL — LOW (ref 3.3–5)
ALP SERPL-CCNC: 204 U/L — HIGH (ref 40–120)
ALT FLD-CCNC: 73 U/L — HIGH (ref 10–45)
ANION GAP SERPL CALC-SCNC: 12 MMOL/L — SIGNIFICANT CHANGE UP (ref 5–17)
APTT BLD: 31.7 SEC — SIGNIFICANT CHANGE UP (ref 27.5–36.3)
AST SERPL-CCNC: 61 U/L — HIGH (ref 10–40)
BILIRUB SERPL-MCNC: 0.8 MG/DL — SIGNIFICANT CHANGE UP (ref 0.2–1.2)
BUN SERPL-MCNC: 25 MG/DL — HIGH (ref 7–23)
CALCIUM SERPL-MCNC: 8.7 MG/DL — SIGNIFICANT CHANGE UP (ref 8.4–10.5)
CHLORIDE SERPL-SCNC: 102 MMOL/L — SIGNIFICANT CHANGE UP (ref 96–108)
CO2 SERPL-SCNC: 22 MMOL/L — SIGNIFICANT CHANGE UP (ref 22–31)
CREAT SERPL-MCNC: 1.19 MG/DL — SIGNIFICANT CHANGE UP (ref 0.5–1.3)
GLUCOSE SERPL-MCNC: 123 MG/DL — HIGH (ref 70–99)
HCT VFR BLD CALC: 28.4 % — LOW (ref 39–50)
HGB BLD-MCNC: 9.4 G/DL — LOW (ref 13–17)
INR BLD: 1.5 RATIO — HIGH (ref 0.88–1.16)
MAGNESIUM SERPL-MCNC: 1.8 MG/DL — SIGNIFICANT CHANGE UP (ref 1.6–2.6)
MCHC RBC-ENTMCNC: 31.2 PG — SIGNIFICANT CHANGE UP (ref 27–34)
MCHC RBC-ENTMCNC: 33.1 GM/DL — SIGNIFICANT CHANGE UP (ref 32–36)
MCV RBC AUTO: 94.4 FL — SIGNIFICANT CHANGE UP (ref 80–100)
NRBC # BLD: 0 /100 WBCS — SIGNIFICANT CHANGE UP (ref 0–0)
PHOSPHATE SERPL-MCNC: 3.7 MG/DL — SIGNIFICANT CHANGE UP (ref 2.5–4.5)
PLATELET # BLD AUTO: 243 K/UL — SIGNIFICANT CHANGE UP (ref 150–400)
POTASSIUM SERPL-MCNC: 3.6 MMOL/L — SIGNIFICANT CHANGE UP (ref 3.5–5.3)
POTASSIUM SERPL-SCNC: 3.6 MMOL/L — SIGNIFICANT CHANGE UP (ref 3.5–5.3)
PROT SERPL-MCNC: 6.4 G/DL — SIGNIFICANT CHANGE UP (ref 6–8.3)
PROTHROM AB SERPL-ACNC: 17.5 SEC — HIGH (ref 10–12.9)
RBC # BLD: 3.01 M/UL — LOW (ref 4.2–5.8)
RBC # FLD: 13.3 % — SIGNIFICANT CHANGE UP (ref 10.3–14.5)
SODIUM SERPL-SCNC: 136 MMOL/L — SIGNIFICANT CHANGE UP (ref 135–145)
WBC # BLD: 8.92 K/UL — SIGNIFICANT CHANGE UP (ref 3.8–10.5)
WBC # FLD AUTO: 8.92 K/UL — SIGNIFICANT CHANGE UP (ref 3.8–10.5)

## 2019-11-11 PROCEDURE — 99232 SBSQ HOSP IP/OBS MODERATE 35: CPT | Mod: GC

## 2019-11-11 RX ORDER — LANOLIN ALCOHOL/MO/W.PET/CERES
3 CREAM (GRAM) TOPICAL ONCE
Refills: 0 | Status: COMPLETED | OUTPATIENT
Start: 2019-11-11 | End: 2019-11-11

## 2019-11-11 RX ORDER — HEPARIN SODIUM 5000 [USP'U]/ML
0 INJECTION INTRAVENOUS; SUBCUTANEOUS
Qty: 0 | Refills: 0 | DISCHARGE

## 2019-11-11 RX ORDER — CLOPIDOGREL BISULFATE 75 MG/1
1 TABLET, FILM COATED ORAL
Qty: 0 | Refills: 0 | DISCHARGE

## 2019-11-11 RX ORDER — IBUPROFEN 200 MG
2 TABLET ORAL
Qty: 0 | Refills: 0 | DISCHARGE

## 2019-11-11 RX ORDER — CEFTRIAXONE 500 MG/1
0 INJECTION, POWDER, FOR SOLUTION INTRAMUSCULAR; INTRAVENOUS
Qty: 0 | Refills: 0 | DISCHARGE

## 2019-11-11 RX ADMIN — Medication 3 MILLILITER(S): at 21:29

## 2019-11-11 RX ADMIN — Medication 3 MILLILITER(S): at 17:32

## 2019-11-11 RX ADMIN — Medication 3 MILLIGRAM(S): at 22:15

## 2019-11-11 RX ADMIN — ATORVASTATIN CALCIUM 40 MILLIGRAM(S): 80 TABLET, FILM COATED ORAL at 21:25

## 2019-11-11 RX ADMIN — Medication 3 MILLILITER(S): at 11:26

## 2019-11-11 RX ADMIN — Medication 3 MILLILITER(S): at 05:23

## 2019-11-11 RX ADMIN — Medication 81 MILLIGRAM(S): at 11:26

## 2019-11-11 RX ADMIN — CLOPIDOGREL BISULFATE 75 MILLIGRAM(S): 75 TABLET, FILM COATED ORAL at 11:26

## 2019-11-11 NOTE — DISCHARGE NOTE PROVIDER - NSDCCPCAREPLAN_GEN_ALL_CORE_FT
PRINCIPAL DISCHARGE DIAGNOSIS  Diagnosis: Non-ST elevation MI (NSTEMI)  Assessment and Plan of Treatment: You presented to the hospital with heart attack symptoms most likely due to your cocaine use. You were found to have momentary damage to your heart caused by cocaine. You underwent a cardiac catherization that revealed long-term complete decrease in blood flow to your right coronary arteries. A stent was not placed in this case as a total lack of blood flow does not require stent placement. You were treated with aspirin and brilinta and atorvastatin 40 mg.  Please continue to take aspirin, brilinta, and atorvastatin on discharge from the hospital. Please quit using cocaine as it put you at high risk of cardiac events in the future. Please return to the hospital if you develop chest pain.      SECONDARY DISCHARGE DIAGNOSES  Diagnosis: Acute respiratory failure  Assessment and Plan of Treatment: You were found to have low oxygen lvels on admission. This may have been due to respiraotry tract viral infection or due to extra fluid in your body due to heart failure. You receieved oxygen therapy and bipap therapy, a device that helps take extra fluid off of your lungs. You also receieved treatment with lasix IV 40 mg, a diuretic that helps you pee the extra water off your body. Your breathing improved with these two interventions. You were transitioned to 40 mg oral lasix on discharge. Please continue to take this medication on discharge.   Please return to the hospital if you develop worsening shortness of breath or leg swelling.    Diagnosis: Atrial fibrillation  Assessment and Plan of Treatment: After your cardiac catherization, you were found to have an abnormal heart rate called atrial fibrillation which is an abnormally fast heart rate. You were treated with medication to slow down your heart rate with which your heart rate normalized.   Please return to the hospital if you develop chest pain, shortness of breath. Please see a cardiologist out patient for further management.    Diagnosis: Acute UTI  Assessment and Plan of Treatment: You were found to have a urinary tract infection at the outside hospital. You were continued on antibiotics during this hospital stay for a total of 5 days, for a total treatment course of 7 days counting what was given at the outside hospital.  Please return to the hospital if your develop fevers, chills, back pain, bloody or fould smelling urine. PRINCIPAL DISCHARGE DIAGNOSIS  Diagnosis: Non-ST elevation MI (NSTEMI)  Assessment and Plan of Treatment: You presented to the hospital with heart attack symptoms most likely due to your cocaine use. You were found to have momentary damage to your heart caused by cocaine. You were treated with aspirin and plavix initially. You underwent a cardiac catherization that revealed long-term complete decrease in blood flow to your right coronary arteries. A stent was not placed in this case as a total lack of blood flow does not require stent placement. You were treated with aspirin and brilinta and atorvastatin 40 mg.  Please continue to take aspirin, plavix, and atorvastatin on discharge from the hospital. Please quit using cocaine as it put you at high risk of cardiac events in the future. Please return to the hospital if you develop chest pain.      SECONDARY DISCHARGE DIAGNOSES  Diagnosis: Acute respiratory failure  Assessment and Plan of Treatment: You were found to have low oxygen levels on admission. This may have been due to respiraotry tract viral infection or due to extra fluid in your body due to heart failure. You receieved oxygen therapy and bipap therapy, a device that helps take extra fluid off of your lungs. You also receieved treatment with lasix 40 mg IV,  a diuretic that helps you remove the extra water off your body. Your breathing improved with these two interventions. You were transitioned to 40 mg oral lasix on discharge. Please continue to take this medication on discharge.   Please return to the hospital if you develop worsening shortness of breath or leg swelling.    Diagnosis: Atrial fibrillation  Assessment and Plan of Treatment: After your cardiac catherization, you were found to have an abnormally fast heart rate known as atrial fibrillation. You were treated with medication to slow down your heart rate for one day. After this episode, your heart rate returned to normal.   Please return to the hospital if you develop chest pain, shortness of breath, or increasing feeling that your heart is racing. Please see a cardiologist out patient for further management.    Diagnosis: Acute UTI  Assessment and Plan of Treatment: You were found to have a urinary tract infection at the outside hospital where you recieved two days of antibiotics. You were continued on antibiotics during this hospital stay for 5 days, for a total treatment course of 7 days. You do not have to take further antibiotics for your urinary infection at this time.   Please return to the hospital if your develop fevers, chills, back pain, bloody urine, foul smelling urine, pain and itching with urination, urethral discharge, or pain in your genital area.

## 2019-11-11 NOTE — PROGRESS NOTE ADULT - PROBLEM SELECTOR PLAN 1
HS trops elevated  3949 --> 5295 --> 4847. EKG at OSH sig for 0.5 ST elevation in inf leads and ST dep in V1-V2. New EKG showing nonspecific changes. r/o NSTEMI. 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   - c/w asa, clopidogrel 75 mg   - c/w atorvastatin 40 mg  - s/p lasix 40 mg IV  - cardiology recs apprecaited: pending   - holding BB in setting of active cocaine use to prevent unchecked alpha activity HS trops elevated  3949 --> 5295 --> 4847. EKG at OSH sig for 0.5 ST elevation in inf leads and ST dep in V1-V2. New EKG showing nonspecific changes. r/o NSTEMI. 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 Brief run of afib 11/9 converted to NSR after metoprolol   - c/w asa, clopidogrel 75 mg   - c/w atorvastatin 40 mg  - s/p lasix 40 mg IV  - cardiology recs apprecaited:  - holding BB in setting of active cocaine use to prevent unchecked alpha activity

## 2019-11-11 NOTE — PROGRESS NOTE ADULT - ASSESSMENT
73 yr old Male with PMHx of BPH, cocaine use, recurrent UTI([facility] hospitalization in 5/2019 for UTI/Sepsis), MRSA Bacteremia 2017 presented to ED from [facility] 11/4/19 after a fall, admitted for NSTEMI transferred to [facility] for R/LHC course c/b AHRF and new paraoxysmal afib 73 yr old Male with PMHx of BPH, cocaine use, recurrent UTI([facility] hospitalization in 5/2019 for UTI/Sepsis), MRSA Bacteremia 2017 presented to ED from [facility] 11/4/19 after a fall, admitted for NSTEMI transferred to [facility] for R/LHC course c/b AHRF and new paraoxysmal afib now sinus rhythm

## 2019-11-11 NOTE — DISCHARGE NOTE PROVIDER - NSDCMRMEDTOKEN_GEN_ALL_CORE_FT
aspirin 81 mg oral tablet: 1 tab(s) orally once a day  hospital  cefTRIAXone 1 g intravenous injection: intravenous every 24 hours  hospital  heparin 1 unit/mL-NaCl 0.9% intravenous solution (obsolete): hospital  ibuprofen 200 mg oral tablet: 2 tab(s) orally once a day  Lipitor 40 mg oral tablet: 1 tab(s) orally once a day  hospital  Plavix 75 mg oral tablet: 1 tab(s) orally once a day  hospital aspirin 81 mg oral tablet: 1 tab(s) orally once a day  hospital  Lipitor 40 mg oral tablet: 1 tab(s) orally once a day  hospital apixaban 5 mg oral tablet: 1 tab(s) orally every 12 hours  aspirin 81 mg oral tablet: 1 tab(s) orally once a day  hospital  furosemide 100 mg/100 mL-0.9% intravenous solution: 40 milliliter(s) intravenous once a day  Lipitor 40 mg oral tablet: 1 tab(s) orally once a day  hospital  metoprolol tartrate 75 mg oral tablet: 1 tab(s) orally 2 times a day

## 2019-11-11 NOTE — DISCHARGE NOTE PROVIDER - HOSPITAL COURSE
This is a 73 yr old Male with PMHx of BPH, recurrent UTI([facility] hospitalization in 5/2019 for UTI/Sepsis), MRSA Bacteremia 2017, cocaine use, presented to ED [facility] 11/4/19 after a fall. Patient reports chills and muscle aches the evening of 11/3/19. He had not received a flu shot this year and his grandson had a URI so he believed he may be coming down with the flu. He went to the bathroom and fell. Pt does not recall how he fell, does not know if he had LOC and hit his head. His daughter heard the sound of his fall, and found him awake and called EMS. At [facility], pt became hypotensive and had elevated cardiac enzymes. EKG showed RV infarct-0.5 mm ST elevations inferiorly and in V1-V2, ST Depressions laterally. TTE showed RV enlargement and dysfunction, LVEF 60-65%, RV volume overload, moderate to severe tricuspid regurgitation Pt was admitted to the CCU. Urine toxicology was positive for Cocaine.  Urinalaysis positive for UTI- started on Ceftriaxone. Lactate was elevated to 3.1. TSH, folate, B12 WNL. CTH was negative. Heparin drip started, ASA/Plavix started. Pt was transferred to [facility] for R/LHC.    On admit, complained of substernal 6/10 cp. Pt's O2 sat dropped to 80s on room air, so he was placed on 3L NC. He denied fevers, medication changes, recent antibiotics, rhinorrhea, sore throat, n/v, abd pain, diarrhea, constipation, rashes, recent travel. Only medication is ibuprofen 200mg 2 tabs once a day.         Patient              Problem: NSTEMI (non-ST elevated myocardial infarction).  Plan: HS trops elevated  3949 --> 5295 --> 4847. EKG at OSH sig for 0.5 ST elevation in inf leads and ST dep in V1-V2. New EKG showing nonspecific changes. r/o NSTEMI. 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 Brief run of afib 11/9 converted to NSR after metoprolol     - c/w asa, clopidogrel 75 mg     - c/w atorvastatin 40 mg    - s/p lasix 40 mg IV    - cardiology recs apprecaited:    - holding BB in setting of active cocaine use to prevent unchecked alpha activity. This is a 73 yr old Male with PMHx of BPH, recurrent UTI([facility] hospitalization in 5/2019 for UTI/Sepsis), MRSA Bacteremia 2017, cocaine use, presented to ED [facility] 11/4/19 after a fall. Patient reports chills and muscle aches the evening of 11/3/19. He had not received a flu shot this year and his grandson had a URI so he believed he may be coming down with the flu. He went to the bathroom and fell. Pt does not recall how he fell, does not know if he had LOC and hit his head. His daughter heard the sound of his fall, and found him awake and called EMS. At [facility], pt became hypotensive and had elevated cardiac enzymes. EKG showed RV infarct-0.5 mm ST elevations inferiorly and in V1-V2, ST Depressions laterally. TTE showed RV enlargement and dysfunction, LVEF 60-65%, RV volume overload, moderate to severe tricuspid regurgitation. Pt was admitted to the CCU. Urine toxicology was positive for Cocaine.  Urinalaysis positive for UTI- started on Ceftriaxone. Lactate was elevated to 3.1. TSH, folate, B12 WNL. CTH was negative. Heparin drip started, ASA/Plavix loaded and was transferred to [facility] for R/LHC. On admit, complained of substernal 6/10 cp. Pt's O2 sat dropped to 80s on room air, so he was placed on 3L NC. He denied fevers, medication changes, recent antibiotics, rhinorrhea, sore throat, n/v, abd pain, diarrhea, constipation, rashes, recent travel. Only medication is ibuprofen 200mg 2 tabs once a day.         Patient was found to have HS trops elevated at 3949 --> 5295 --> 4847. EKG showed nonspecific changes. Patient's cp and and elevated trops were considered likely 2/2 cocaine-induced cardiac injury. He was continued on heparin gtt and asa and brilinta and started on atorvastatin 40 mg. Patient was found to be volume overloaded and received lasix 40 mg IV x 3 days 11/8 - 11/10. Patient underwent R/LHC on 11/8 via RRA revealing for  of likely non-dominant RCA as well as non occlusive dz of LAD (50%) and dLAD (50%).         Course was complicated by AHRF. RVP was negative. Likely in the setting of undetected respiratory viral infection given wheezing noted on exam vs contribution of volume overload. Patient received duonebs PRN and was weaned off O2 to RA on day of discharge. Course was further complicated by new afib. Patient had short runs of afib overnight on 11/9 up to 140 bpm treated with metoprolol 12.5 PO, metoprolol 5 mg IV, diltiazem 10 mg IV and then converted to NSR. Patient was not started on AC as CHADSVASC 1 and due to resolution of afib.         Patient to f/u with op cardiology Dr. Dixon within 1-2 weeks. Will d/c on asa and brilinta x 1 year. This is a 73 yr old Male with PMHx of BPH, recurrent UTI([facility] hospitalization in 5/2019 for UTI/Sepsis), MRSA Bacteremia 2017, cocaine use, presented to ED [facility] 11/4/19 after a fall. Patient reports chills and muscle aches the evening of 11/3/19. He had not received a flu shot this year and his grandson had a URI so he believed he may be coming down with the flu. He went to the bathroom and fell. Pt does not recall how he fell, does not know if he had LOC and hit his head. His daughter heard the sound of his fall, and found him awake and called EMS. At [facility], pt became hypotensive and had elevated cardiac enzymes. EKG showed RV infarct-0.5 mm ST elevations inferiorly and in V1-V2, ST Depressions laterally. TTE showed RV enlargement and dysfunction, LVEF 60-65%, RV volume overload, moderate to severe tricuspid regurgitation. Pt was admitted to the CCU. Urine toxicology was positive for Cocaine.  Urinalaysis positive for UTI- started on Ceftriaxone. Lactate was elevated to 3.1. TSH, folate, B12 WNL. CTH was negative. Heparin drip started, ASA/Plavix loaded and was transferred to [facility] for R/LHC. On admit, complained of substernal 6/10 cp. Pt's O2 sat dropped to 80s on room air, so he was placed on 3L NC. He denied fevers, medication changes, recent antibiotics, rhinorrhea, sore throat, n/v, abd pain, diarrhea, constipation, rashes, recent travel. Only medication is ibuprofen 200mg 2 tabs once a day.         Patient was found to have HS trops elevated at 3949 --> 5295 --> 4847. EKG showed nonspecific changes. Patient's cp and and elevated trops were considered likely 2/2 cocaine-induced cardiac injury. He was continued on heparin gtt and asa and plavix and started on atorvastatin 40 mg. Patient was found to be volume overloaded and received lasix 40 mg IV x 3 days 11/8 - 11/10. Patient underwent R/LHC on 11/8 via RRA revealing for  of likely non-dominant RCA as well as non occlusive dz of LAD (50%) and dLAD (50%).         Course was complicated by AHRF. RVP was negative. Likely in the setting of undetected respiratory viral infection given wheezing noted on exam vs contribution of volume overload. Patient received duonebs PRN and was weaned off O2 to RA on day of discharge. Course was further complicated by new afib. Patient had short runs of afib overnight on 11/9 up to 140 bpm treated with metoprolol 12.5 PO, metoprolol 5 mg IV, diltiazem 10 mg IV and then converted to NSR. Patient was not started on AC as CHADSVASC 1 and due to resolution of afib.         Patient to f/u with op cardiology Dr. Dixon within 1-2 weeks. Will d/c on asa and brilinta x 1 year. This is a 73 yr old Male with PMHx of BPH, recurrent UTI([facility] hospitalization in 5/2019 for UTI/Sepsis), MRSA Bacteremia 2017, cocaine use, presented to ED [facility] 11/4/19 after a fall. Patient reports chills and muscle aches the evening of 11/3/19. He had not received a flu shot this year and his grandson had a URI so he believed he may be coming down with the flu. He went to the bathroom and fell. Pt does not recall how he fell, does not know if he had LOC and hit his head. His daughter heard the sound of his fall, and found him awake and called EMS. At [facility], pt became hypotensive and had elevated cardiac enzymes. EKG showed RV infarct-0.5 mm ST elevations inferiorly and in V1-V2, ST Depressions laterally. TTE showed RV enlargement and dysfunction, LVEF 60-65%, RV volume overload, moderate to severe tricuspid regurgitation. Pt was admitted to the CCU. Urine toxicology was positive for Cocaine.  Urinalaysis positive for UTI- started on Ceftriaxone. Lactate was elevated to 3.1. TSH, folate, B12 WNL. CTH was negative. Heparin drip started, ASA/Plavix loaded and was transferred to [facility] for R/LHC. On admit, complained of substernal 6/10 cp. Pt's O2 sat dropped to 80s on room air, so he was placed on 3L NC. He denied fevers, medication changes, recent antibiotics, rhinorrhea, sore throat, n/v, abd pain, diarrhea, constipation, rashes, recent travel. Only medication is ibuprofen 200mg 2 tabs once a day.         Patient was found to have HS trops elevated at 3949 --> 5295 --> 4847. EKG showed nonspecific changes. Patient's cp and and elevated trops were considered likely 2/2 cocaine-induced cardiac injury. He was continued on heparin gtt and asa and plavix and started on atorvastatin 40 mg. Patient was found to be volume overloaded and received lasix 40 mg IV x 3 days 11/8 - 11/10. Patient underwent R/LHC on 11/8 via RRA revealing for  of likely non-dominant RCA as well as non occlusive dz of LAD (50%) and dLAD (50%).         Course was complicated by AHRF. RVP was negative. Likely in the setting of undetected respiratory viral infection given wheezing noted on exam vs contribution of volume overload. Patient received duonebs PRN and was weaned off O2 to RA on day of discharge. Course was further complicated by new afib. Patient had short runs of afib overnight on 11/9 up to 140 bpm treated with metoprolol 12.5 PO, metoprolol 5 mg IV, diltiazem 10 mg IV and then converted to NSR. Patient was not started on AC as CHADSVASC 1 and due to resolution of afib. Pt being monitored for respiratory status and afib with RVR. Currently controlled with diuretics and beta blockers.         Plan for EP procedure on 11/15 for LAMAR guided DCCV.         However, on night of 11/14 pt is being transferred to [facility] per daughter of patient under care of Dr. Nino Toth. This transfer was against plan of primary team and not initiated by [facility]. Primary team was not contacted or communicated with by Dr. Toth. Team was unaware of plan for transfer until the transportation provided by [facility] arrived. Risks of leaving hospital and plan for EP procedure were discussed and declined by daughter and patient.         Pt with avss though still being given labetalol for afib.

## 2019-11-11 NOTE — PROGRESS NOTE ADULT - PROBLEM SELECTOR PLAN 2
Hx of recurrent UTIs in the setting of BPH. OSH >100,000 GNR   - remains afebrile   - ceftriaxone 1g qd (11/6-11/10, 11/4- from OSH); will d/c as uncomplicated UTI tx completed    - UCx NGTD likely as patient already on abx   - monitor for retention

## 2019-11-11 NOTE — DISCHARGE NOTE PROVIDER - NSDCFUADDAPPT_GEN_ALL_CORE_FT
Please make an appointment to follow up with the following doctors 1-2 weeks from discharge:  1) Cardiology: Dr. Dixon  2) Your primary medical doctor

## 2019-11-11 NOTE — PROGRESS NOTE ADULT - PROBLEM SELECTOR PLAN 5
TTE with EF 55-60%. Normal right heart systolic fxn. Left ventricular akinetic with normal fxn. Mod TR. Borderline pul HTN.  - not on diuretics at home  - s/p IV lasix 40 mg

## 2019-11-11 NOTE — DISCHARGE NOTE PROVIDER - CARE PROVIDER_API CALL
Steven Dixon; PhD)  Medicine  Cardiology  [address] [place] [address]  Phone: [phone]  Fax: [phone]  Follow Up Time: 2 weeks

## 2019-11-11 NOTE — PROGRESS NOTE ADULT - SUBJECTIVE AND OBJECTIVE BOX
***************************************************************  Dr. Josefina Melissa  Internal Medicine   [facility] Pager: [phone]  [facility] Pager: [phone]   ***************************************************************    ALEX DE LEON  73y  MRN: [id number]    Subjective:    Patient is a 73y old  Male who presents with a chief complaint of STEMI (11 Nov 2019 06:07)      Interval history/overnight events:    KATERIN ON.       MEDICATIONS  (STANDING):  albuterol/ipratropium for Nebulization 3 milliLiter(s) Nebulizer every 6 hours  aspirin enteric coated 81 milliGRAM(s) Oral daily  atorvastatin 40 milliGRAM(s) Oral at bedtime  clopidogrel Tablet 75 milliGRAM(s) Oral daily  influenza   Vaccine 0.5 milliLiter(s) IntraMuscular once    MEDICATIONS  (PRN):        Objective:    Vitals: Vital Signs Last 24 Hrs  T(C): 37.4 (11-11-19 @ 04:22), Max: 37.4 (11-11-19 @ 04:22)  T(F): 99.4 (11-11-19 @ 04:22), Max: 99.4 (11-11-19 @ 04:22)  HR: 55 (11-11-19 @ 04:22) (55 - 65)  BP: 127/70 (11-11-19 @ 04:22) (103/60 - 135/71)  BP(mean): --  RR: 18 (11-11-19 @ 04:22) (18 - 18)  SpO2: 94% (11-11-19 @ 04:22) (93% - 96%)            I&O's Summary    09 Nov 2019 07:01  -  10 Nov 2019 07:00  --------------------------------------------------------  IN: 360 mL / OUT: 675 mL / NET: -315 mL    10 Nov 2019 07:01  -  11 Nov 2019 06:53  --------------------------------------------------------  IN: 720 mL / OUT: 1825 mL / NET: -1105 mL      LABS:    11-10    135  |  99  |  26<H>  ----------------------------<  131<H>  3.8   |  22  |  1.22  11-09    138  |  104  |  25<H>  ----------------------------<  153<H>  3.6   |  21<L>  |  1.21  11-08    136  |  105  |  24<H>  ----------------------------<  124<H>  4.0   |  19<L>  |  1.19    Ca    8.5      10 Nov 2019 06:04  Ca    8.4      09 Nov 2019 04:03  Ca    8.4      08 Nov 2019 07:15  Phos  3.8     11-10  Mg     1.8     11-10    TPro  6.0  /  Alb  3.1<L>  /  TBili  0.7  /  DBili  x   /  AST  65<H>  /  ALT  69<H>  /  AlkPhos  215<H>  11-10  TPro  6.1  /  Alb  2.9<L>  /  TBili  0.5  /  DBili  x   /  AST  50<H>  /  ALT  51<H>  /  AlkPhos  218<H>  11-09    PT/INR - ( 11 Nov 2019 06:07 )   PT: 17.5 sec;   INR: 1.50 ratio         PTT - ( 11 Nov 2019 06:07 )  PTT:31.7 sec                                        9.4    8.92  )-----------( 243      ( 11 Nov 2019 06:07 )             28.4                         9.7    9.44  )-----------( 225      ( 10 Nov 2019 09:27 )             29.4                         10.6   9.28  )-----------( 185      ( 09 Nov 2019 04:03 )             31.2     CAPILLARY BLOOD GLUCOSE              RADIOLOGY & ADDITIONAL TESTS:            Imaging Personally Reviewed:  [ ] YES  [ ] NO    Consultants involved in case:   Consultant(s) Notes Reviewed:  [ ] YES  [ ] NO:   Care Discussed with Consultants/Other Providers [ ] YES  [ ] NO ***************************************************************  Dr. Josefina Melissa  Internal Medicine   [facility] Pager: [phone]  [facility] Pager: [phone]   ***************************************************************    ALEX DE LEON  73y  MRN: [id number]    Subjective:    Patient is a 73y old  Male who presents with a chief complaint of STEMI (11 Nov 2019 06:07)      Interval history/overnight events:    KATERIN ON. This AM, endorses dry cough, denies cp, sob, heart palpitations, dysuria. Tele: sinus/sinus shy 50s-90s.       MEDICATIONS  (STANDING):  albuterol/ipratropium for Nebulization 3 milliLiter(s) Nebulizer every 6 hours  aspirin enteric coated 81 milliGRAM(s) Oral daily  atorvastatin 40 milliGRAM(s) Oral at bedtime  clopidogrel Tablet 75 milliGRAM(s) Oral daily  influenza   Vaccine 0.5 milliLiter(s) IntraMuscular once    MEDICATIONS  (PRN):        Objective:    Vitals: Vital Signs Last 24 Hrs  T(C): 37.4 (11-11-19 @ 04:22), Max: 37.4 (11-11-19 @ 04:22)  T(F): 99.4 (11-11-19 @ 04:22), Max: 99.4 (11-11-19 @ 04:22)  HR: 55 (11-11-19 @ 04:22) (55 - 65)  BP: 127/70 (11-11-19 @ 04:22) (103/60 - 135/71)  BP(mean): --  RR: 18 (11-11-19 @ 04:22) (18 - 18)  SpO2: 94% (11-11-19 @ 04:22) (93% - 96%)            I&O's Summary    09 Nov 2019 07:01  -  10 Nov 2019 07:00  --------------------------------------------------------  IN: 360 mL / OUT: 675 mL / NET: -315 mL    10 Nov 2019 07:01  -  11 Nov 2019 06:53  --------------------------------------------------------  IN: 720 mL / OUT: 1825 mL / NET: -1105 mL    Physical exam:  General: elderly male in NAD respiring on 2L lying in bed   HEENT: 2x2 cm ecchymosis on left frontotemporal scalp s/p fall, PERRL  Lungs: lungs CTAB, no wheezing, not using accessory mm to breathe   Cardiac: RRR, no murmurs, gallops, rubs, -JVD, -HJR   Abdomen: soft, slightly distended, normoactive, no suprapubic tenderness, no rebound, guarding, rigidity, negative CVA bl  Neurological: A&O x 3, strength 5/5 upper and lower extremities with gross sense of touch intact   MSK: not accessed   Skin: large ecchymoses at left lateral hip, left inguinal region at prior fem cath site, no indurance suggestive of hematoma, stable   Extremity: non-pitting edema noted bl; LLE PT, DP pulses 2+ bl, RRP 2+   : deferred       LABS:    11-10    135  |  99  |  26<H>  ----------------------------<  131<H>  3.8   |  22  |  1.22  11-09    138  |  104  |  25<H>  ----------------------------<  153<H>  3.6   |  21<L>  |  1.21  11-08    136  |  105  |  24<H>  ----------------------------<  124<H>  4.0   |  19<L>  |  1.19    Ca    8.5      10 Nov 2019 06:04  Ca    8.4      09 Nov 2019 04:03  Ca    8.4      08 Nov 2019 07:15  Phos  3.8     11-10  Mg     1.8     11-10    TPro  6.0  /  Alb  3.1<L>  /  TBili  0.7  /  DBili  x   /  AST  65<H>  /  ALT  69<H>  /  AlkPhos  215<H>  11-10  TPro  6.1  /  Alb  2.9<L>  /  TBili  0.5  /  DBili  x   /  AST  50<H>  /  ALT  51<H>  /  AlkPhos  218<H>  11-09    PT/INR - ( 11 Nov 2019 06:07 )   PT: 17.5 sec;   INR: 1.50 ratio         PTT - ( 11 Nov 2019 06:07 )  PTT:31.7 sec                                        9.4    8.92  )-----------( 243      ( 11 Nov 2019 06:07 )             28.4                         9.7    9.44  )-----------( 225      ( 10 Nov 2019 09:27 )             29.4                         10.6   9.28  )-----------( 185      ( 09 Nov 2019 04:03 )             31.2     CAPILLARY BLOOD GLUCOSE              RADIOLOGY & ADDITIONAL TESTS:            Imaging Personally Reviewed:  [ ] YES  [ ] NO    Consultants involved in case:   Consultant(s) Notes Reviewed:  [ ] YES  [ ] NO:   Care Discussed with Consultants/Other Providers [ ] YES  [ ] NO

## 2019-11-11 NOTE — PROGRESS NOTE ADULT - PROBLEM SELECTOR PLAN 3
BCx growing coag neg staph 1/2 bottles, likely contaminant. Patient has a distant hx of IVDU.   - s/p 1 g vancomycin 11/9   - f/u repeat bcx

## 2019-11-11 NOTE — PROGRESS NOTE ADULT - PROBLEM SELECTOR PLAN 4
Patient with inc tachypnea to 33, hypoxia, pleuritic cp, and inc wob initially. CXR wnl.   - CXR without pulm edema, euvolemic on exam  - RVP negative  - duonebs PRN   - guaifenesin for cough   - CTM on NC and bipap as needed  - encourage incentive spirometry Patient with inc tachypnea to 33, hypoxia, pleuritic cp, and inc wob initially. CXR wnl.   - euvolemic on exam  - RVP negative  - duonebs PRN   - guaifenesin for cough   - CTM on NC and bipap as needed  - encourage incentive spirometry

## 2019-11-12 LAB
ALBUMIN SERPL ELPH-MCNC: 3 G/DL — LOW (ref 3.3–5)
ALP SERPL-CCNC: 218 U/L — HIGH (ref 40–120)
ALT FLD-CCNC: 103 U/L — HIGH (ref 10–45)
ANION GAP SERPL CALC-SCNC: 11 MMOL/L — SIGNIFICANT CHANGE UP (ref 5–17)
AST SERPL-CCNC: 94 U/L — HIGH (ref 10–40)
BILIRUB SERPL-MCNC: 0.8 MG/DL — SIGNIFICANT CHANGE UP (ref 0.2–1.2)
BUN SERPL-MCNC: 22 MG/DL — SIGNIFICANT CHANGE UP (ref 7–23)
CALCIUM SERPL-MCNC: 8.8 MG/DL — SIGNIFICANT CHANGE UP (ref 8.4–10.5)
CHLORIDE SERPL-SCNC: 101 MMOL/L — SIGNIFICANT CHANGE UP (ref 96–108)
CK MB BLD-MCNC: 2.8 % — SIGNIFICANT CHANGE UP (ref 0–3.5)
CK MB CFR SERPL CALC: 1.8 NG/ML — SIGNIFICANT CHANGE UP (ref 0–6.7)
CK MB CFR SERPL CALC: 2.1 NG/ML — SIGNIFICANT CHANGE UP (ref 0–6.7)
CK SERPL-CCNC: 74 U/L — SIGNIFICANT CHANGE UP (ref 30–200)
CK SERPL-CCNC: 75 U/L — SIGNIFICANT CHANGE UP (ref 30–200)
CO2 SERPL-SCNC: 25 MMOL/L — SIGNIFICANT CHANGE UP (ref 22–31)
CREAT SERPL-MCNC: 1.02 MG/DL — SIGNIFICANT CHANGE UP (ref 0.5–1.3)
CULTURE RESULTS: SIGNIFICANT CHANGE UP
GLUCOSE SERPL-MCNC: 141 MG/DL — HIGH (ref 70–99)
HCT VFR BLD CALC: 29.8 % — LOW (ref 39–50)
HGB BLD-MCNC: 9.7 G/DL — LOW (ref 13–17)
MCHC RBC-ENTMCNC: 30.9 PG — SIGNIFICANT CHANGE UP (ref 27–34)
MCHC RBC-ENTMCNC: 32.6 GM/DL — SIGNIFICANT CHANGE UP (ref 32–36)
MCV RBC AUTO: 94.9 FL — SIGNIFICANT CHANGE UP (ref 80–100)
NRBC # BLD: 0 /100 WBCS — SIGNIFICANT CHANGE UP (ref 0–0)
PLATELET # BLD AUTO: 283 K/UL — SIGNIFICANT CHANGE UP (ref 150–400)
POTASSIUM SERPL-MCNC: 3.9 MMOL/L — SIGNIFICANT CHANGE UP (ref 3.5–5.3)
POTASSIUM SERPL-SCNC: 3.9 MMOL/L — SIGNIFICANT CHANGE UP (ref 3.5–5.3)
PROT SERPL-MCNC: 6.5 G/DL — SIGNIFICANT CHANGE UP (ref 6–8.3)
RBC # BLD: 3.14 M/UL — LOW (ref 4.2–5.8)
RBC # FLD: 13.2 % — SIGNIFICANT CHANGE UP (ref 10.3–14.5)
SODIUM SERPL-SCNC: 137 MMOL/L — SIGNIFICANT CHANGE UP (ref 135–145)
SPECIMEN SOURCE: SIGNIFICANT CHANGE UP
TROPONIN T, HIGH SENSITIVITY RESULT: 2645 NG/L — HIGH (ref 0–51)
TROPONIN T, HIGH SENSITIVITY RESULT: 3172 NG/L — HIGH (ref 0–51)
WBC # BLD: 10.33 K/UL — SIGNIFICANT CHANGE UP (ref 3.8–10.5)
WBC # FLD AUTO: 10.33 K/UL — SIGNIFICANT CHANGE UP (ref 3.8–10.5)

## 2019-11-12 PROCEDURE — 99233 SBSQ HOSP IP/OBS HIGH 50: CPT | Mod: GC

## 2019-11-12 PROCEDURE — 93010 ELECTROCARDIOGRAM REPORT: CPT

## 2019-11-12 RX ORDER — DILTIAZEM HCL 120 MG
10 CAPSULE, EXT RELEASE 24 HR ORAL ONCE
Refills: 0 | Status: COMPLETED | OUTPATIENT
Start: 2019-11-12 | End: 2019-11-12

## 2019-11-12 RX ORDER — DILTIAZEM HCL 120 MG
2.5 CAPSULE, EXT RELEASE 24 HR ORAL
Qty: 125 | Refills: 0 | Status: DISCONTINUED | OUTPATIENT
Start: 2019-11-12 | End: 2019-11-12

## 2019-11-12 RX ORDER — DILTIAZEM HCL 120 MG
60 CAPSULE, EXT RELEASE 24 HR ORAL EVERY 6 HOURS
Refills: 0 | Status: DISCONTINUED | OUTPATIENT
Start: 2019-11-12 | End: 2019-11-13

## 2019-11-12 RX ORDER — LANOLIN ALCOHOL/MO/W.PET/CERES
3 CREAM (GRAM) TOPICAL AT BEDTIME
Refills: 0 | Status: DISCONTINUED | OUTPATIENT
Start: 2019-11-12 | End: 2019-11-15

## 2019-11-12 RX ORDER — DILTIAZEM HCL 120 MG
5 CAPSULE, EXT RELEASE 24 HR ORAL
Qty: 125 | Refills: 0 | Status: DISCONTINUED | OUTPATIENT
Start: 2019-11-12 | End: 2019-11-12

## 2019-11-12 RX ORDER — AMIODARONE HYDROCHLORIDE 400 MG/1
150 TABLET ORAL ONCE
Refills: 0 | Status: COMPLETED | OUTPATIENT
Start: 2019-11-12 | End: 2019-11-12

## 2019-11-12 RX ORDER — SODIUM CHLORIDE 9 MG/ML
500 INJECTION INTRAMUSCULAR; INTRAVENOUS; SUBCUTANEOUS ONCE
Refills: 0 | Status: COMPLETED | OUTPATIENT
Start: 2019-11-12 | End: 2019-11-12

## 2019-11-12 RX ORDER — DILTIAZEM HCL 120 MG
20 CAPSULE, EXT RELEASE 24 HR ORAL ONCE
Refills: 0 | Status: COMPLETED | OUTPATIENT
Start: 2019-11-12 | End: 2019-11-12

## 2019-11-12 RX ORDER — APIXABAN 2.5 MG/1
5 TABLET, FILM COATED ORAL EVERY 12 HOURS
Refills: 0 | Status: DISCONTINUED | OUTPATIENT
Start: 2019-11-12 | End: 2019-11-15

## 2019-11-12 RX ADMIN — APIXABAN 5 MILLIGRAM(S): 2.5 TABLET, FILM COATED ORAL at 17:00

## 2019-11-12 RX ADMIN — Medication 3 MILLILITER(S): at 13:02

## 2019-11-12 RX ADMIN — Medication 5 MG/HR: at 05:36

## 2019-11-12 RX ADMIN — AMIODARONE HYDROCHLORIDE 600 MILLIGRAM(S): 400 TABLET ORAL at 08:38

## 2019-11-12 RX ADMIN — Medication 10 MILLIGRAM(S): at 04:38

## 2019-11-12 RX ADMIN — SODIUM CHLORIDE 1000 MILLILITER(S): 9 INJECTION INTRAMUSCULAR; INTRAVENOUS; SUBCUTANEOUS at 08:31

## 2019-11-12 RX ADMIN — Medication 20 MILLIGRAM(S): at 06:25

## 2019-11-12 RX ADMIN — Medication 3 MILLILITER(S): at 05:37

## 2019-11-12 RX ADMIN — CLOPIDOGREL BISULFATE 75 MILLIGRAM(S): 75 TABLET, FILM COATED ORAL at 11:26

## 2019-11-12 RX ADMIN — Medication 60 MILLIGRAM(S): at 16:58

## 2019-11-12 RX ADMIN — Medication 10 MILLIGRAM(S): at 04:57

## 2019-11-12 RX ADMIN — ATORVASTATIN CALCIUM 40 MILLIGRAM(S): 80 TABLET, FILM COATED ORAL at 22:03

## 2019-11-12 RX ADMIN — Medication 3 MILLIGRAM(S): at 22:03

## 2019-11-12 RX ADMIN — Medication 81 MILLIGRAM(S): at 11:26

## 2019-11-12 NOTE — PROGRESS NOTE ADULT - ASSESSMENT
73M with BPH and cocaine use a/w NSTEMI s/p LHC (oLAD 50%, mLAD with myocardial bridging, RCA ), now hospital course c/b new onset AFIB w/ RVR    #Afib w/ RVR  -rates initially difficult to control; s/p cardizem 10mgx1, 20mg x2, amiodarone 150mg x1  -now on cardizem gtt 2.5mg  -start cardizem 30mg q6h; can increase to 60mg q6h if difficulty to come off of gtt  -wean off cardizem gtt  -start Eliquis 5mg BID (chadsvasc 2)     #NSTEMI  - would switch to ASA as single agent antiplatelet given will be on AC  - c/w atorvastatin 40mg QHS    Discussed w/ Dr. Dung Michael MD  Cardiology Fellow - PGY 4  Text or Call: [phone]  For all New Consults and Questions:  [email or web address]   Login: [email or web address]

## 2019-11-12 NOTE — DIETITIAN INITIAL EVALUATION ADULT. - ADD RECOMMEND
1) Continue to monitor weight, lab values, and diet tolerance. 2) Reviewed alternate menu options and menu ordering procedure. 3) Pt declines diet education at this time. RD availability made known to pt.

## 2019-11-12 NOTE — DIETITIAN INITIAL EVALUATION ADULT. - PERTINENT MEDS FT
MEDICATIONS  (STANDING):  albuterol/ipratropium for Nebulization 3 milliLiter(s) Nebulizer every 6 hours  aspirin enteric coated 81 milliGRAM(s) Oral daily  atorvastatin 40 milliGRAM(s) Oral at bedtime  clopidogrel Tablet 75 milliGRAM(s) Oral daily  diltiazem Infusion 2.5 mG/Hr (2.5 mL/Hr) IV Continuous <Continuous>  influenza   Vaccine 0.5 milliLiter(s) IntraMuscular once    MEDICATIONS  (PRN):

## 2019-11-12 NOTE — PROGRESS NOTE ADULT - PROBLEM SELECTOR PLAN 10
#Problem 10: normocytic anemia  - ferritin nl, TIBC normal, iron low, likely anemia of chronic dz  DVT: hep gtt  Diet: Dash/TLC  Transitions of Care Status:  1.  Name of PCP:  2.  PCP Contacted on Admission: [ ] Y    [ ] N    3.  PCP contacted at Discharge: [ ] Y    [ ] N    [ ] N/A  4.  Post-Discharge Appointment Date and Location:  5.  Summary of Handoff given to PCP:

## 2019-11-12 NOTE — PROGRESS NOTE ADULT - SUBJECTIVE AND OBJECTIVE BOX
***************************************************************  Dr. Josefina Melissa  Internal Medicine   [facility] Pager: [phone]  [facility] Pager: [phone]   ***************************************************************    ALEX DE LEON  73y  MRN: [id number]    Subjective:    Patient is a 73y old  Male who presents with a chief complaint of STEMI (11 Nov 2019 14:05)      Interval history/overnight events:    ON SVTS to 170s c/o palpitations and 2/10 cp. Otherwise vitally stable. s/p cardizem 10 mg x 2 converted to afib. Started on cardizem drip 5mg/hr. Continued to be in afib to 150s on drip. bolused cardizem 20 mg over 2 mins w/ 4 sec pause on tele. Cardiology aware.       MEDICATIONS  (STANDING):  albuterol/ipratropium for Nebulization 3 milliLiter(s) Nebulizer every 6 hours  aspirin enteric coated 81 milliGRAM(s) Oral daily  atorvastatin 40 milliGRAM(s) Oral at bedtime  clopidogrel Tablet 75 milliGRAM(s) Oral daily  diltiazem Infusion 5 mG/Hr (5 mL/Hr) IV Continuous <Continuous>  influenza   Vaccine 0.5 milliLiter(s) IntraMuscular once    MEDICATIONS  (PRN):        Objective:    Vitals: Vital Signs Last 24 Hrs  T(C): 36.9 (11-12-19 @ 03:47), Max: 37 (11-11-19 @ 10:36)  T(F): 98.5 (11-12-19 @ 03:47), Max: 98.6 (11-11-19 @ 10:36)  HR: 96 (11-12-19 @ 06:32) (58 - 150)  BP: 101/66 (11-12-19 @ 06:32) (101/66 - 134/73)  BP(mean): --  RR: 18 (11-12-19 @ 03:47) (18 - 18)  SpO2: 94% (11-12-19 @ 03:47) (94% - 95%)            I&O's Summary    11 Nov 2019 07:01  -  12 Nov 2019 07:00  --------------------------------------------------------  IN: 1140 mL / OUT: 800 mL / NET: 340 mL          LABS:    11-11    136  |  102  |  25<H>  ----------------------------<  123<H>  3.6   |  22  |  1.19  11-10    135  |  99  |  26<H>  ----------------------------<  131<H>  3.8   |  22  |  1.22    Ca    8.7      11 Nov 2019 06:07  Ca    8.5      10 Nov 2019 06:04  Phos  3.7     11-11  Mg     1.8     11-11    TPro  6.4  /  Alb  2.9<L>  /  TBili  0.8  /  DBili  x   /  AST  61<H>  /  ALT  73<H>  /  AlkPhos  204<H>  11-11  TPro  6.0  /  Alb  3.1<L>  /  TBili  0.7  /  DBili  x   /  AST  65<H>  /  ALT  69<H>  /  AlkPhos  215<H>  11-10    PT/INR - ( 11 Nov 2019 06:07 )   PT: 17.5 sec;   INR: 1.50 ratio         PTT - ( 11 Nov 2019 06:07 )  PTT:31.7 sec                                        9.7    10.33 )-----------( 283      ( 12 Nov 2019 04:43 )             29.8                         9.4    8.92  )-----------( 243      ( 11 Nov 2019 06:07 )             28.4                         9.7    9.44  )-----------( 225      ( 10 Nov 2019 09:27 )             29.4     CAPILLARY BLOOD GLUCOSE              RADIOLOGY & ADDITIONAL TESTS:            Imaging Personally Reviewed:  [ ] YES  [ ] NO    Consultants involved in case:   Consultant(s) Notes Reviewed:  [ ] YES  [ ] NO:   Care Discussed with Consultants/Other Providers [ ] YES  [ ] NO ***************************************************************  Dr. Josefina Melissa  Internal Medicine   [facility] Pager: [phone]  [facility] Pager: [phone]   ***************************************************************    ALEX DE LEON  73y  MRN: [id number]    Subjective:    Patient is a 73y old  Male who presents with a chief complaint of STEMI (11 Nov 2019 14:05)      Interval history/overnight events:    ON SVTS to 170s c/o palpitations and 2/10 cp. Otherwise vitally stable. s/p cardizem 10 mg x 2 converted to afib. Started on cardizem drip 5mg/hr. Continued to be in afib to 150s on drip. bolused cardizem 20 mg over 2 mins w/ 4 sec pause on tele. SBPs downtrended to 78 on cardizem drip. Drip held momentarily and then decreased to 2.5 mg/hr. Cardiology aware. CKMB 2.1. HS trop 3172, downtrending. Bolused 500 cc NS.       MEDICATIONS  (STANDING):  albuterol/ipratropium for Nebulization 3 milliLiter(s) Nebulizer every 6 hours  aspirin enteric coated 81 milliGRAM(s) Oral daily  atorvastatin 40 milliGRAM(s) Oral at bedtime  clopidogrel Tablet 75 milliGRAM(s) Oral daily  diltiazem Infusion 5 mG/Hr (5 mL/Hr) IV Continuous <Continuous>  influenza   Vaccine 0.5 milliLiter(s) IntraMuscular once    MEDICATIONS  (PRN):        Objective:    Vitals: Vital Signs Last 24 Hrs  T(C): 36.9 (11-12-19 @ 03:47), Max: 37 (11-11-19 @ 10:36)  T(F): 98.5 (11-12-19 @ 03:47), Max: 98.6 (11-11-19 @ 10:36)  HR: 96 (11-12-19 @ 06:32) (58 - 150)  BP: 101/66 (11-12-19 @ 06:32) (101/66 - 134/73)  BP(mean): --  RR: 18 (11-12-19 @ 03:47) (18 - 18)  SpO2: 94% (11-12-19 @ 03:47) (94% - 95%)            I&O's Summary    11 Nov 2019 07:01  -  12 Nov 2019 07:00  --------------------------------------------------------  IN: 1140 mL / OUT: 800 mL / NET: 340 mL    Physical exam:  General: elderly male in NAD respiring on 2L   HEENT: 2x2 cm ecchymosis on left frontotemporal scalp s/p fall, PERRL  Lungs: lungs CTAB, no wheezing, not using accessory mm to breathe   Cardiac: RRR, no murmurs, gallops, rubs, -JVD, -HJR   Abdomen: soft, slightly distended, normoactive, no suprapubic tenderness, no rebound, guarding, rigidity, negative CVA bl  Neurological: A&O x 3, strength 5/5 upper and lower extremities with gross sense of touch intact   MSK: not accessed   Skin: large ecchymoses at left lateral hip, left inguinal region at prior fem cath site, no indurance suggestive of hematoma, stable from prior   Extremity: non-pitting edema noted bl; LLE PT, DP pulses 2+ bl, RRP 2+     LABS:    11-11    136  |  102  |  25<H>  ----------------------------<  123<H>  3.6   |  22  |  1.19  11-10    135  |  99  |  26<H>  ----------------------------<  131<H>  3.8   |  22  |  1.22    Ca    8.7      11 Nov 2019 06:07  Ca    8.5      10 Nov 2019 06:04  Phos  3.7     11-11  Mg     1.8     11-11    TPro  6.4  /  Alb  2.9<L>  /  TBili  0.8  /  DBili  x   /  AST  61<H>  /  ALT  73<H>  /  AlkPhos  204<H>  11-11  TPro  6.0  /  Alb  3.1<L>  /  TBili  0.7  /  DBili  x   /  AST  65<H>  /  ALT  69<H>  /  AlkPhos  215<H>  11-10    PT/INR - ( 11 Nov 2019 06:07 )   PT: 17.5 sec;   INR: 1.50 ratio         PTT - ( 11 Nov 2019 06:07 )  PTT:31.7 sec                                        9.7    10.33 )-----------( 283      ( 12 Nov 2019 04:43 )             29.8                         9.4    8.92  )-----------( 243      ( 11 Nov 2019 06:07 )             28.4                         9.7    9.44  )-----------( 225      ( 10 Nov 2019 09:27 )             29.4     CAPILLARY BLOOD GLUCOSE              RADIOLOGY & ADDITIONAL TESTS:            Imaging Personally Reviewed:  [ ] YES  [ ] NO    Consultants involved in case:   Consultant(s) Notes Reviewed:  [ ] YES  [ ] NO:   Care Discussed with Consultants/Other Providers [ ] YES  [ ] NO

## 2019-11-12 NOTE — DIETITIAN INITIAL EVALUATION ADULT. - PROBLEM SELECTOR PLAN 6
RV dysfxn noted on TTE at OSH with mod TR, LVEF 60-65%   elevated BNP here  check official TTE and cardiology c/s regarding need for diuretics  Prelim read by cardiology attending: enlarged RV, not well visualized and akinesis?

## 2019-11-12 NOTE — DIETITIAN INITIAL EVALUATION ADULT. - ENERGY NEEDS
Height: 71 inches, Weight: 231.2 pounds (today)  BMI: 32 kg/m2 IBW: 172 pounds (+/-10%), %IBW: 134%  Pertinent Info: Per chart, 74 y/o male admitted s/p fall at home with STEMI, found to have RV volume overload and moderate to severe tricuspid regurgitation, proximal Afib and + cocaine.  +1 bilateral ankles edema, no pressure ulcers noted at this time.

## 2019-11-12 NOTE — DIETITIAN INITIAL EVALUATION ADULT. - PROBLEM SELECTOR PLAN 3
on ceftriaxone, hx of recurrent UTIs   urethral swab from OSH growing >100k GNR  send urine culture for sensitivities

## 2019-11-12 NOTE — DIETITIAN INITIAL EVALUATION ADULT. - PERTINENT LABORATORY DATA
Na 137 [135 - 145], K+ 3.9 [3.5 - 5.3], BUN 22 [7 - 23], Cr 1.02 [0.50 - 1.30],  [70 - 99], Phos --, Alk Phos 218 [40 - 120], AST 94 [10 - 40],  [10 - 45], Mg --, Ca 8.8 [8.4 - 10.5], HbA1c --

## 2019-11-12 NOTE — PROGRESS NOTE ADULT - PROBLEM SELECTOR PLAN 1
New paroxysmal afib  - continue on cardizem drip for now   - likely will require PM   - cardiology aware, pending recs New paroxysmal afib. Source likely pulmonic in nature, ?component of undiagnosed MARY given large neck circumference   - s/p cardizem gtt, s/p 500 cc NS bolus   - remains in afib  - c/w 60mg PO q6 hrs  - d/c plavix  - start 5 mg eliquis BID  - cardiology aware, pending recs New paroxysmal afib. Source likely pulmonic in nature, ?component of undiagnosed MARY given large neck circumference   - s/p 500 cc NS bolus for low SBP  - remains in afib  - start diltiazem 60mg PO q6 hrs  - c/w diltiazem gtt for 2 hrs   - d/c plavix  - start 5 mg eliquis BID  - cardiology aware, pending recs

## 2019-11-12 NOTE — PROGRESS NOTE ADULT - PROBLEM SELECTOR PLAN 2
HS trops elevated  3949 --> 5295 --> 4847. EKG at OSH sig for 0.5 ST elevation in inf leads and ST dep in V1-V2. New EKG showing nonspecific changes. r/o NSTEMI. 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 Brief run of afib 11/9 converted to NSR after metoprolol   - c/w asa, clopidogrel 75 mg   - c/w atorvastatin 40 mg  - s/p lasix 40 mg IV  - cardiology recs apprecaited: pending  - holding BB in setting of active cocaine use to prevent unchecked alpha activity HS trops elevated  3949 --> 5295 --> 4847 on admit. EKG at OSH sig for 0.5 ST elevation in inf leads and ST dep in V1-V2. New EKG showing nonspecific changes. r/o NSTEMI. 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 Brief run of afib 11/9 converted to NSR after metoprolol   - c/w asa, clopidogrel d/andrew   - c/w atorvastatin 40 mg  - s/p lasix 40 mg IV  - cardiology recs apprecaited: pending  - holding BB in setting of active cocaine use to prevent unchecked alpha activity

## 2019-11-12 NOTE — CHART NOTE - NSCHARTNOTEFT_GEN_A_CORE
74 y/o man with BPH, cocaine use admitted for NSTEMI s/p LHC showing ostial LAD 50%, mLAD with myocardial bridging and RCA ).     Course c/b intermittent atrial afibrillation with RVR with spontaneous reversion back to sinus rhythm--team was told to hold beta blocker given sinus bradycardia and cocaine use. Overnight, cardiology called again for HRs in 150s with BPs in 120s/80s, appeared to be SVT, responded to dilt 10 IV x 2 and then 20 IV and started on dilt gtt. Currently rates in 90s-100s in afib, SBPs 100s and asymptomatic.    However on review of telemetry, also noted, 50+ beats ventricular tachycardia with stable blood pressure at the time that broke on its own.    -replete K4/Mg2  -troponin downtrending, CKMB negative  -continue dilt gtt for now for afib with RVR vs other SVT rhythm  -continue ASA, plavix and statin for NSTEMI  -will discuss plan with general cardiology team re: VT given non-revascularized disease and being off beta blockade due to cocaine use 72 y/o man with BPH, cocaine use admitted for NSTEMI s/p LHC showing ostial LAD 50%, mLAD with myocardial bridging and RCA ).     Course c/b intermittent atrial afibrillation with RVR with spontaneous reversion back to sinus rhythm--team was told to hold beta blocker given sinus bradycardia and cocaine use. Overnight, cardiology called again for HRs in 150s with BPs in 120s/80s, appeared to be SVT, responded to dilt 10 IV x 2 and then 20 IV and started on dilt gtt. Currently rates in 90s-100s in afib, SBPs 100s and asymptomatic.    On review of telemetry, there are approximately 50 wide complex beats at rates of 150s with stable blood pressure. This was reviewed with EP attending who believes this appears to be SVT with aberrancy as the cycle length from the 50 beats is the same as the SVT rhythm.     -replete K4/Mg2  -troponin downtrending, CKMB negative  -continue dilt gtt for SVT with aberrancy  -continue ASA, plavix and statin for NSTEMI  -General cardiology consult team will follow 74 y/o man with BPH, cocaine use admitted for NSTEMI s/p LHC showing ostial LAD 50%, mLAD with myocardial bridging and RCA ).     Course c/b intermittent atrial afibrillation with RVR with spontaneous reversion back to sinus rhythm--team was told to hold beta blocker given sinus bradycardia and cocaine use. Overnight, cardiology called again for HRs in 150s with BPs in 120s/80s, appeared to be SVT, responded to dilt 10 IV x 2 and then 20 IV and started on dilt gtt. Currently rates in 90s-100s in afib, SBPs 100s and asymptomatic.    On review of telemetry, there are approximately 50 wide complex beats at rates of 150s with stable blood pressure. This was reviewed with EP attending who believes this appears to be SVT with aberrancy as the cycle length from the 50 beats is the same as the SVT rhythm.     -replete K4/Mg2  -troponin downtrending, CKMB negative  -continue dilt gtt for SVT with aberrancy (which appears to be atrial tachycardia)  -continue ASA, plavix and statin for NSTEMI  -General cardiology consult team will follow 72 y/o man with BPH, cocaine use admitted for NSTEMI s/p LHC showing ostial LAD 50%, mLAD with myocardial bridging and RCA ).     Course c/b intermittent atrial fibrillation with RVR with spontaneous reversion back to sinus rhythm--team was told to hold beta blocker given sinus bradycardia and cocaine use. Overnight, cardiology called again for HRs in 150s with BPs in 120s/80s, appeared to be SVT, responded to dilt 10 IV x 2 and then 20 IV and started on dilt gtt. Currently rates in 90s-100s in afib, SBPs 100s and asymptomatic.    On review of telemetry, there are approximately 50 wide complex beats at rates of 150s with stable blood pressure. This was reviewed with EP attending who believes this appears to be SVT with aberrancy as the cycle length from the 50 beats is the same as the SVT rhythm.     -replete K4/Mg2  -troponin downtrending, CKMB negative  -continue dilt gtt for SVT with aberrancy (which appears to be atrial tachycardia)  -continue ASA, plavix and statin for NSTEMI  -General cardiology consult team will follow    ATTENDING:  Patient interviewed and examined. I was physically present for the essential portions of the E/M service provided.  Chart reviewed and note edited where appropriate.  Case discussed with fellow.  Agree w/ Assessment and Plan as outlined.  Also receicved amio bolus; now back on dilt drip. ECG is c/w ASMI of EDGAR but electrodes are misplaced.  Hold A/C pending  control of rhythm in next 24H.    Charly Razo MD [facility]  Spectra:  [phone]  Office: [phone]

## 2019-11-12 NOTE — PROGRESS NOTE ADULT - SUBJECTIVE AND OBJECTIVE BOX
Patient seen and examined at bedside.    Overnight Events:   Called back for new onset afib w/ RVR.  Pt asymptomatic, however received Lopressor 5mg x,1, cardizem 10mg x2, cardizem 20mg x1, with initiation of gtt w/o successful rate control.  Pt subsequently hypotensive, decision made by primary team to give amio bolus, switched back to cardizem gtt @ 2.5mg.  Currently improved rates to 90-100s, still asymptomatic and now HDS.  denies CP, palpitations, SOB.      REVIEW OF SYSTEMS:  Constitutional:     [x ] negative [ ] fevers [ ] chills [ ] weight loss [ ] weight gain  HEENT:                  [x ] negative [ ] dry eyes [ ] eye irritation [ ] postnasal drip [ ] nasal congestion  CV:                         [ x] negative  [ ] chest pain [ ] orthopnea [ ] palpitations [ ] murmur  Resp:                     [ x] negative [ ] cough [ ] shortness of breath [ ] dyspnea [ ] wheezing [ ] sputum [ ]hemoptysis  GI:                          [ x] negative [ ] nausea [ ] vomiting [ ] diarrhea [ ] constipation [ ] abd pain [ ] dysphagia   :                        [ x] negative [ ] dysuria [ ] nocturia [ ] hematuria [ ] increased urinary frequency  Musculoskeletal: [ x] negative [ ] back pain [ ] myalgias [ ] arthralgias [ ] fracture  Skin:                       [ x] negative [ ] rash [ ] itch  Neurological:        [x ] negative [ ] headache [ ] dizziness [ ] syncope [ ] weakness [ ] numbness  Psychiatric:           [ x] negative [ ] anxiety [ ] depression  Endocrine:            [ x] negative [ ] diabetes [ ] thyroid problem  Heme/Lymph:      [ x] negative [ ] anemia [ ] bleeding problem  Allergic/Immune: [ x] negative [ ] itchy eyes [ ] nasal discharge [ ] hives [ ] angioedema    [ x] All other systems negative  [ ] Unable to assess ROS due to    Current Meds:  apixaban 5 milliGRAM(s) Oral every 12 hours  aspirin enteric coated 81 milliGRAM(s) Oral daily  atorvastatin 40 milliGRAM(s) Oral at bedtime  diltiazem    Tablet 60 milliGRAM(s) Oral every 6 hours  diltiazem Infusion 2.5 mG/Hr IV Continuous <Continuous>  influenza   Vaccine 0.5 milliLiter(s) IntraMuscular once      PAST MEDICAL & SURGICAL HISTORY:  Urinary tract infection associated with catheterization of urinary tract, unspecified indwelling urinary catheter type, subsequent encounter  S/P urological surgery      Vitals:  T(F): 97.6 (11-12), Max: 98.6 (11-11)  HR: 120 (11-12) (58 - 150)  BP: 108/65 (11-12) (85/56 - 134/73)  RR: 18 (11-12)  SpO2: 97% (11-12)  I&O's Summary    11 Nov 2019 07:01  -  12 Nov 2019 07:00  --------------------------------------------------------  IN: 1140 mL / OUT: 800 mL / NET: 340 mL    12 Nov 2019 07:01  -  12 Nov 2019 18:12  --------------------------------------------------------  IN: 480 mL / OUT: 400 mL / NET: 80 mL        Physical Exam:  Appearance: No acute distress; well appearing  Eyes: PERRL, EOMI, pink conjunctiva  HENT: Normal oral mucosa  Cardiovascular: RRR, S1, S2, no murmurs, rubs, or gallops; no edema; no JVD  Respiratory: Clear to auscultation bilaterally  Gastrointestinal: soft, non-tender, non-distended with normal bowel sounds  Musculoskeletal: No clubbing; no joint deformity   Neurologic: Non-focal  Lymphatic: No lymphadenopathy  Psychiatry: AAOx3, mood & affect appropriate  Skin: No rashes, ecchymoses, or cyanosis                          9.7    10.33 )-----------( 283      ( 12 Nov 2019 04:43 )             29.8     11-12    137  |  101  |  22  ----------------------------<  141<H>  3.9   |  25  |  1.02    Ca    8.8      12 Nov 2019 11:20  Phos  3.7     11-11  Mg     1.8     11-11    TPro  6.5  /  Alb  3.0<L>  /  TBili  0.8  /  DBili  x   /  AST  94<H>  /  ALT  103<H>  /  AlkPhos  218<H>  11-12    PT/INR - ( 11 Nov 2019 06:07 )   PT: 17.5 sec;   INR: 1.50 ratio         PTT - ( 11 Nov 2019 06:07 )  PTT:31.7 sec  CARDIAC MARKERS ( 12 Nov 2019 08:21 )  x     / x     / 75 U/L / x     / 2.1 ng/mL  CARDIAC MARKERS ( 12 Nov 2019 04:43 )  x     / x     / 74 U/L / x     / 1.8 ng/mL      Serum Pro-Brain Natriuretic Peptide: 3206 pg/mL (11-06 @ 17:29)          Interpretation of Telemetry: AT, AF varying rates 90-150s, mostly 90-100s at this time    11/6/19 TTE:    Observations:  Mitral Valve: Mitral annular calcification, otherwise  normal mitral valve. Minimal mitral regurgitation.  Aortic Valve/Aorta: Aortic valve not well visualized;  appears calcified. Peak transaortic valve gradient equals  10 mm Hg, estimated aortic valve area equals 2.8 sqcm.  Minimal aortic regurgitation.  Peak left ventricular  outflow tract gradient equals 7 mm Hg.  Aortic Root: 3.1 cm.  Left Atrium: Normal left atrium.  LA volume index = 29  cc/m2.  Left Ventricle: Overall preserved left ventricular ejection  fraction despite segmental wall motion abnormalities.  Endocardial visualization enhanced with intravenous  injection of Ultrasonic Enhancing Agent (Definity). The  basal  inferolateral wall, the basal anterolateral wall,  and the mid anterolateral wall are hypokinetic. The basal  inferior wall, and the mid inferior wall are akinetic.  Normal left ventricular internal dimensions and wall  thicknesses.  Right Heart: Mild right atrial enlargement. Theright  ventricle is not well visualized; grossly Right ventricular  enlargement with normal right ventricular systolic  function. Normal tricuspid valve. Moderate tricuspid  regurgitation. Pulmonic valve not well visualized. Minimal  pulmonic regurgitation.  Pericardium/Pleura: Normal pericardium with no pericardial  effusion.  Hemodynamic: Estimated right atrial pressure is 8 mm Hg.  Estimated right ventricular systolic pressure equals 35 mm  Hg, assuming right atrial pressure equals 8 mm Hg,  consistent with borderline pulmonary hypertension.

## 2019-11-12 NOTE — DIETITIAN INITIAL EVALUATION ADULT. - PROBLEM SELECTOR PLAN 1
with hypoxia, pleuritic CP, tachypnea, accessory muscle use;   CXR read as clear. Euvolemic.  ABG demonstrating normal pH but with decreased CO2. Pleuritic chest pain may be related to cocaine induced vasospasms vs MI   CTA to r/o PE if TTE is concerning, however hemodynamically stable now, on heparin gtt, and elevated INR; Cardiology consult to help determine as pt is pending R/LHC and do not want to give pt double contrast load  Check Cardiac enzymes, pro BNP, TTE to check for R heart strain  check blood cultures  trial of BIPAP for now

## 2019-11-12 NOTE — PROVIDER CONTACT NOTE (CRITICAL VALUE NOTIFICATION) - ACTION/TREATMENT ORDERED:
Will follow heparin drip order protocol.
Np aware, will continue to monitor pt
Continue to monitor R2 pads at bedside,defib at bedside.
repeat blood cults, continue to monitor, give vanco IVPB
will continue monitor

## 2019-11-12 NOTE — PROVIDER CONTACT NOTE (CRITICAL VALUE NOTIFICATION) - ASSESSMENT
pt alert and oriented x4. vss.
/69, RR 28,HR 64,O2 SAT 98%. Denies any complaints.
98.5 150 117/70 18 94% room air
Pt A+Ox2-3, denies pain/ discomfort. VS assessed, NP aware
no s.s of distress noted. resting in bed comfortably. on ceftriaxone Q24 hrs

## 2019-11-12 NOTE — PROGRESS NOTE ADULT - PROBLEM SELECTOR PLAN 5
Patient with inc tachypnea to 33, hypoxia, pleuritic cp, and inc wob initially. CXR wnl.   - euvolemic on exam  - RVP negative  - duonebs PRN   - guaifenesin for cough   - CTM on NC and bipap as needed  - encourage incentive spirometry Patient with inc tachypnea to 33, hypoxia, pleuritic cp, and inc wob initially. CXR wnl.   - euvolemic on exam  - RVP negative  - duonebs d/andrew   - guaifenesin for cough PRN   - encourage incentive spirometry

## 2019-11-12 NOTE — DIETITIAN INITIAL EVALUATION ADULT. - PROBLEM SELECTOR PLAN 4
Not on coumadin at home, no hx of liver disease  Repeat INR in the AM   May be due to hepatic congestion vs sepsis

## 2019-11-12 NOTE — CHART NOTE - NSCHARTNOTEFT_GEN_A_CORE
Called by tele team re: SVT to 170's    Upon evaluation of tele monitor: pt with SVT/Afib pattern to 170's. Pt also with 40 beats of Vtach when evaluated on tele monitor    PE:  Sat 92%; /70;  irregular  NAD,   Lung cta b/l   chest: irregularly irregular    Pt complained of some chest palpitations and some mild chest pain 2/10.     Cardiology fellow consulted.   Labs sent; notable for troponin to 3100 (low compared to admission values)    Events: Pt given cardizem 10mg x 2 with some decrease in heart rate.   Started on 5mg/hr cardizem drip at 530 am.   Pt evaluted at 6 am with afib to 150's. while on drip  discussed with cardiology; given bolus of 20mg over 2 min while continuing on cardizem drip.   HR to 90's 100's in afib with pressure to 100/70.     plan discussed with nursing staff and cardiology fellow  plan signed out to day team.       Eamon Cárdenas, pgy 1

## 2019-11-12 NOTE — PROGRESS NOTE ADULT - PROBLEM SELECTOR PLAN 4
BCx growing coag neg staph 1/2 bottles, likely contaminant. Patient has a distant hx of IVDU.   - s/p 1 g vancomycin 11/9   - f/u repeat bcx BCx growing coag neg staph 1/2 bottles, likely contaminant. Patient has a distant hx of IVDU.   - s/p 1 g vancomycin 11/9   - repeat BCx 11/9 NGTD

## 2019-11-12 NOTE — PROGRESS NOTE ADULT - ASSESSMENT
73 yr old Male with PMHx of BPH, cocaine use, recurrent UTI([facility] hospitalization in 5/2019 for UTI/Sepsis), MRSA Bacteremia 2017 presented to ED from [facility] 11/4/19 after a fall, admitted for NSTEMI transferred to [facility] for R/LHC course c/b AHRF and new paraoxysmal afib

## 2019-11-12 NOTE — DIETITIAN INITIAL EVALUATION ADULT. - PROBLEM SELECTOR PLAN 5
no abd pain, AST>ALT, may be related to sepsis vs likely due to hepatic congestion from RV dysfxn.    monitor CMP  check hepatic panel and acetaminophen level  VA duplex of abd to r/o portal vein thrombosis

## 2019-11-12 NOTE — DIETITIAN INITIAL EVALUATION ADULT. - PROBLEM SELECTOR PLAN 7
bladder obstruction on imaging from OSH noted, difficulty placing farnsworth   check bladder scan  strict Is and Os

## 2019-11-12 NOTE — DIETITIAN INITIAL EVALUATION ADULT. - OTHER INFO
Pt reports good appetite and > 75% po intakes of meals. Noted 50-80% po intakes documented per flowsheet. Pt denies chewing/swallowing difficulties. NKFA. No GI distress, +BM yesterday. Pt reports good appetite PTA, denies following any modified diet PTA. Pt reports UBW as 230 pounds denies any recent wt fluctuations, current wt stable at 231.2 pounds (11/11).

## 2019-11-13 LAB
AMPHET UR-MCNC: NEGATIVE — SIGNIFICANT CHANGE UP
ANION GAP SERPL CALC-SCNC: 13 MMOL/L — SIGNIFICANT CHANGE UP (ref 5–17)
BARBITURATES UR SCN-MCNC: NEGATIVE — SIGNIFICANT CHANGE UP
BENZODIAZ UR-MCNC: NEGATIVE — SIGNIFICANT CHANGE UP
BUN SERPL-MCNC: 22 MG/DL — SIGNIFICANT CHANGE UP (ref 7–23)
CALCIUM SERPL-MCNC: 8.4 MG/DL — SIGNIFICANT CHANGE UP (ref 8.4–10.5)
CHLORIDE SERPL-SCNC: 102 MMOL/L — SIGNIFICANT CHANGE UP (ref 96–108)
CO2 SERPL-SCNC: 22 MMOL/L — SIGNIFICANT CHANGE UP (ref 22–31)
COCAINE METAB.OTHER UR-MCNC: NEGATIVE — SIGNIFICANT CHANGE UP
CREAT SERPL-MCNC: 0.97 MG/DL — SIGNIFICANT CHANGE UP (ref 0.5–1.3)
GLUCOSE SERPL-MCNC: 134 MG/DL — HIGH (ref 70–99)
HCT VFR BLD CALC: 33.7 % — LOW (ref 39–50)
HGB BLD-MCNC: 10.8 G/DL — LOW (ref 13–17)
MAGNESIUM SERPL-MCNC: 1.9 MG/DL — SIGNIFICANT CHANGE UP (ref 1.6–2.6)
MCHC RBC-ENTMCNC: 31.3 PG — SIGNIFICANT CHANGE UP (ref 27–34)
MCHC RBC-ENTMCNC: 32 GM/DL — SIGNIFICANT CHANGE UP (ref 32–36)
MCV RBC AUTO: 97.7 FL — SIGNIFICANT CHANGE UP (ref 80–100)
METHADONE UR-MCNC: NEGATIVE — SIGNIFICANT CHANGE UP
OPIATES UR-MCNC: NEGATIVE — SIGNIFICANT CHANGE UP
OXYCODONE UR-MCNC: NEGATIVE — SIGNIFICANT CHANGE UP
PCP SPEC-MCNC: SIGNIFICANT CHANGE UP
PCP UR-MCNC: NEGATIVE — SIGNIFICANT CHANGE UP
PHOSPHATE SERPL-MCNC: 3 MG/DL — SIGNIFICANT CHANGE UP (ref 2.5–4.5)
PLATELET # BLD AUTO: 341 K/UL — SIGNIFICANT CHANGE UP (ref 150–400)
POTASSIUM SERPL-MCNC: 3.9 MMOL/L — SIGNIFICANT CHANGE UP (ref 3.5–5.3)
POTASSIUM SERPL-SCNC: 3.9 MMOL/L — SIGNIFICANT CHANGE UP (ref 3.5–5.3)
RBC # BLD: 3.45 M/UL — LOW (ref 4.2–5.8)
RBC # FLD: 13.4 % — SIGNIFICANT CHANGE UP (ref 10.3–14.5)
SODIUM SERPL-SCNC: 137 MMOL/L — SIGNIFICANT CHANGE UP (ref 135–145)
THC UR QL: NEGATIVE — SIGNIFICANT CHANGE UP
TSH SERPL-MCNC: 6.31 UIU/ML — HIGH (ref 0.27–4.2)
WBC # BLD: 10.83 K/UL — HIGH (ref 3.8–10.5)
WBC # FLD AUTO: 10.83 K/UL — HIGH (ref 3.8–10.5)

## 2019-11-13 PROCEDURE — 99233 SBSQ HOSP IP/OBS HIGH 50: CPT | Mod: GC

## 2019-11-13 PROCEDURE — 93010 ELECTROCARDIOGRAM REPORT: CPT

## 2019-11-13 PROCEDURE — 71045 X-RAY EXAM CHEST 1 VIEW: CPT | Mod: 26

## 2019-11-13 RX ORDER — DILTIAZEM HCL 120 MG
90 CAPSULE, EXT RELEASE 24 HR ORAL EVERY 6 HOURS
Refills: 0 | Status: DISCONTINUED | OUTPATIENT
Start: 2019-11-13 | End: 2019-11-13

## 2019-11-13 RX ORDER — DILTIAZEM HCL 120 MG
20 CAPSULE, EXT RELEASE 24 HR ORAL ONCE
Refills: 0 | Status: DISCONTINUED | OUTPATIENT
Start: 2019-11-13 | End: 2019-11-13

## 2019-11-13 RX ORDER — DILTIAZEM HCL 120 MG
60 CAPSULE, EXT RELEASE 24 HR ORAL EVERY 6 HOURS
Refills: 0 | Status: DISCONTINUED | OUTPATIENT
Start: 2019-11-13 | End: 2019-11-13

## 2019-11-13 RX ORDER — FUROSEMIDE 40 MG
80 TABLET ORAL ONCE
Refills: 0 | Status: COMPLETED | OUTPATIENT
Start: 2019-11-13 | End: 2019-11-13

## 2019-11-13 RX ORDER — LANOLIN ALCOHOL/MO/W.PET/CERES
5 CREAM (GRAM) TOPICAL ONCE
Refills: 0 | Status: COMPLETED | OUTPATIENT
Start: 2019-11-13 | End: 2019-11-13

## 2019-11-13 RX ORDER — METOPROLOL TARTRATE 50 MG
5 TABLET ORAL EVERY 6 HOURS
Refills: 0 | Status: DISCONTINUED | OUTPATIENT
Start: 2019-11-13 | End: 2019-11-13

## 2019-11-13 RX ORDER — METOPROLOL TARTRATE 50 MG
25 TABLET ORAL EVERY 6 HOURS
Refills: 0 | Status: DISCONTINUED | OUTPATIENT
Start: 2019-11-13 | End: 2019-11-14

## 2019-11-13 RX ADMIN — Medication 90 MILLIGRAM(S): at 11:55

## 2019-11-13 RX ADMIN — Medication 5 MILLIGRAM(S): at 22:41

## 2019-11-13 RX ADMIN — ATORVASTATIN CALCIUM 40 MILLIGRAM(S): 80 TABLET, FILM COATED ORAL at 22:41

## 2019-11-13 RX ADMIN — APIXABAN 5 MILLIGRAM(S): 2.5 TABLET, FILM COATED ORAL at 17:12

## 2019-11-13 RX ADMIN — APIXABAN 5 MILLIGRAM(S): 2.5 TABLET, FILM COATED ORAL at 06:04

## 2019-11-13 RX ADMIN — Medication 81 MILLIGRAM(S): at 11:55

## 2019-11-13 RX ADMIN — Medication 80 MILLIGRAM(S): at 08:18

## 2019-11-13 RX ADMIN — Medication 60 MILLIGRAM(S): at 03:00

## 2019-11-13 RX ADMIN — Medication 25 MILLIGRAM(S): at 17:12

## 2019-11-13 NOTE — PROGRESS NOTE ADULT - SUBJECTIVE AND OBJECTIVE BOX
***************************************************************  Dr. Josefina Melissa  Internal Medicine   [facility] Pager: [phone]  [facility] Pager: [phone]   ***************************************************************    ALEX DE LEON  73y  MRN: [id number]    Subjective:    Patient is a 73y old  Male who presents with a chief complaint of STEMI (12 Nov 2019 18:12)      Interval history/overnight events:    Afib RVR 140s ON at 3AM as diltiazem dose held (hold parameters were SBP <105, changed to <95).       MEDICATIONS  (STANDING):  apixaban 5 milliGRAM(s) Oral every 12 hours  aspirin enteric coated 81 milliGRAM(s) Oral daily  atorvastatin 40 milliGRAM(s) Oral at bedtime  diltiazem    Tablet 60 milliGRAM(s) Oral every 6 hours  influenza   Vaccine 0.5 milliLiter(s) IntraMuscular once  melatonin 3 milliGRAM(s) Oral at bedtime    MEDICATIONS  (PRN):        Objective:    Vitals: Vital Signs Last 24 Hrs  T(C): 36.8 (11-13-19 @ 04:27), Max: 37.3 (11-12-19 @ 21:24)  T(F): 98.2 (11-13-19 @ 04:27), Max: 99.1 (11-12-19 @ 21:24)  HR: 82 (11-13-19 @ 04:27) (60 - 148)  BP: 107/65 (11-13-19 @ 04:27) (85/56 - 133/73)  BP(mean): --  RR: 18 (11-13-19 @ 04:27) (18 - 18)  SpO2: 95% (11-13-19 @ 04:27) (93% - 97%)            I&O's Summary    11 Nov 2019 07:01  -  12 Nov 2019 07:00  --------------------------------------------------------  IN: 1140 mL / OUT: 800 mL / NET: 340 mL    12 Nov 2019 07:01  -  13 Nov 2019 06:50  --------------------------------------------------------  IN: 480 mL / OUT: 900 mL / NET: -420 mL        LABS:    11-12    137  |  101  |  22  ----------------------------<  141<H>  3.9   |  25  |  1.02  11-11    136  |  102  |  25<H>  ----------------------------<  123<H>  3.6   |  22  |  1.19    Ca    8.8      12 Nov 2019 11:20  Ca    8.7      11 Nov 2019 06:07    TPro  6.5  /  Alb  3.0<L>  /  TBili  0.8  /  DBili  x   /  AST  94<H>  /  ALT  103<H>  /  AlkPhos  218<H>  11-12  TPro  6.4  /  Alb  2.9<L>  /  TBili  0.8  /  DBili  x   /  AST  61<H>  /  ALT  73<H>  /  AlkPhos  204<H>  11-11                                            9.7    10.33 )-----------( 283      ( 12 Nov 2019 04:43 )             29.8                         9.4    8.92  )-----------( 243      ( 11 Nov 2019 06:07 )             28.4                         9.7    9.44  )-----------( 225      ( 10 Nov 2019 09:27 )             29.4     CAPILLARY BLOOD GLUCOSE              RADIOLOGY & ADDITIONAL TESTS:            Imaging Personally Reviewed:  [ ] YES  [ ] NO    Consultants involved in case:   Consultant(s) Notes Reviewed:  [ ] YES  [ ] NO:   Care Discussed with Consultants/Other Providers [ ] YES  [ ] NO

## 2019-11-13 NOTE — PROGRESS NOTE ADULT - ASSESSMENT
73M with BPH and cocaine use a/w NSTEMI s/p LHC (oLAD 50%, mLAD with myocardial bridging, RCA ), now hospital course c/b new onset AFIB w/ RVR, now w/ ADHF.    #ADHF  -suspect multifactorial but concern for negative inotropic effect of cardizem  -discontinue cardizem  -give 80mg Lasix IV x1  -if remains dyspneic or hypoxic into evening, would give addl Lasix 40mg IV tonight  -I/Os, daily weights  -start BB as below    #afib w/ RVR  -rates improved on cardizem however showing signs of decompensated heart failure  -would d/c cardizem  -start metoprolol 25mg q6h  -can use lopressor 5mg q6h HR sustained > 120s  -c/w Eliquis 5mg BID    #NSTEMI  - would switch to ASA as single agent antiplatelet given will be on AC  - c/w atorvastatin 40mg QHS    Discussed w/ Dr. Dung Michael MD  Cardiology Fellow - PGY 4  Text or Call: [phone]  For all New Consults and Questions:  [email or web address]   Login: [email or web address]

## 2019-11-13 NOTE — PROGRESS NOTE ADULT - ASSESSMENT
73 yr old Male with PMHx of BPH, cocaine use, recurrent UTI([facility] hospitalization in 5/2019 for UTI/Sepsis), MRSA Bacteremia 2017 presented to ED from [facility] 11/4/19 after a fall, admitted for NSTEMI transferred to [facility] for R/LHC course c/b AHRF and new afib with RVR

## 2019-11-13 NOTE — PROGRESS NOTE ADULT - PROBLEM SELECTOR PLAN 5
Patient with inc tachypnea to 33, hypoxia, pleuritic cp, and inc wob initially. CXR wnl.   - euvolemic on exam  - RVP negative  - duonebs d/andrew   - guaifenesin for cough PRN   - encourage incentive spirometry BCx growing coag neg staph 1/2 bottles, likely contaminant. Patient has a distant hx of IVDU.   - s/p 1 g vancomycin 11/9   - repeat BCx 11/9 NGTD

## 2019-11-13 NOTE — PROGRESS NOTE ADULT - PROBLEM SELECTOR PLAN 3
Hx of recurrent UTIs in the setting of BPH. OSH >100,000 GNR   - remains afebrile   - ceftriaxone 1g qd (11/6-11/10, 11/4- from OSH); will d/c as uncomplicated UTI tx completed    - UCx NGTD likely as patient already on abx   - monitor for retention HS trops elevated  3949 --> 5295 --> 4847 on admit. EKG at OSH sig for 0.5 ST elevation in inf leads and ST dep in V1-V2. New EKG showing nonspecific changes. r/o NSTEMI. 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 Brief run of afib 11/9 converted to NSR after metoprolol. s/p L/RHC 11/8 found to have  of RCA with subclinical dz of oLAD and dLAD   - c/w asa, clopidogrel d/andrew as on AC  - c/w atorvastatin 40 mg

## 2019-11-13 NOTE — PROGRESS NOTE ADULT - PROBLEM SELECTOR PLAN 6
TTE with EF 55-60%. Normal right heart systolic fxn. Left ventricular akinetic with normal fxn. Mod TR. Borderline pul HTN.  - not on diuretics at home  - s/p IV lasix 40 mg Patient with inc tachypnea to 33, hypoxia, pleuritic cp, and inc wob initially. CXR wnl.   - inc wob  - CXR 11/13 without sig edema or focal consolidation (pending final read)  - RVP negative  - duonebs d/andrew   - guaifenesin for cough PRN   - encourage incentive spirometry

## 2019-11-13 NOTE — PROGRESS NOTE ADULT - PROBLEM SELECTOR PLAN 4
BCx growing coag neg staph 1/2 bottles, likely contaminant. Patient has a distant hx of IVDU.   - s/p 1 g vancomycin 11/9   - repeat BCx 11/9 NGTD Hx of recurrent UTIs in the setting of BPH. OSH >100,000 GNR   - remains afebrile   - ceftriaxone 1g qd (11/6-11/10, 11/4- from OSH); will d/c as uncomplicated UTI tx completed    - UCx NGTD likely as patient already on abx   - monitor for retention

## 2019-11-13 NOTE — PROGRESS NOTE ADULT - PROBLEM SELECTOR PLAN 2
HS trops elevated  3949 --> 5295 --> 4847 on admit. EKG at OSH sig for 0.5 ST elevation in inf leads and ST dep in V1-V2. New EKG showing nonspecific changes. r/o NSTEMI. 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 Brief run of afib 11/9 converted to NSR after metoprolol   - c/w asa, clopidogrel d/andrew as on AC  - c/w atorvastatin 40 mg  - s/p lasix 40 mg IV  - cardiology recs apprecaited: pending  - holding BB in setting of active cocaine use to prevent unchecked alpha activity TTE with EF 55-60%. Normal right heart systolic fxn. Left ventricular akinetic with normal fxn. Mod TR. Borderline pul HTN. Not on diuretics at home  - s/p lasix IV 40 prior   - overloaded on exam today   - s/p 80 mg IV lasix this AM, net - 400cc   - IV lasix 40 mg as needed

## 2019-11-13 NOTE — PROGRESS NOTE ADULT - ATTENDING COMMENTS
Patient interviewed and examined. I was physically present for the essential portions of the E/M service provided.  Chart reviewed and note edited where appropriate.  Case discussed with fellow.  Agree w/ Assessment and Plan as outlined.    Charly Razo MD [facility]  Spectra:  [phone]  Office: [phone]

## 2019-11-13 NOTE — PROGRESS NOTE ADULT - SUBJECTIVE AND OBJECTIVE BOX
Patient seen and examined at bedside.    Overnight Events:   Ongoing dyspnea this AM.  HR more controlled. denies CP, palpitations, abd pain.       REVIEW OF SYSTEMS:  Constitutional:     [x ] negative [ ] fevers [ ] chills [ ] weight loss [ ] weight gain  HEENT:                  [x ] negative [ ] dry eyes [ ] eye irritation [ ] postnasal drip [ ] nasal congestion  CV:                         [ x] negative  [ ] chest pain [ ] orthopnea [ ] palpitations [ ] murmur  Resp:                     [ x] negative [ ] cough [ ] shortness of breath [ ] dyspnea [ ] wheezing [ ] sputum [ ]hemoptysis  GI:                          [ x] negative [ ] nausea [ ] vomiting [ ] diarrhea [ ] constipation [ ] abd pain [ ] dysphagia   :                        [ x] negative [ ] dysuria [ ] nocturia [ ] hematuria [ ] increased urinary frequency  Musculoskeletal: [ x] negative [ ] back pain [ ] myalgias [ ] arthralgias [ ] fracture  Skin:                       [ x] negative [ ] rash [ ] itch  Neurological:        [x ] negative [ ] headache [ ] dizziness [ ] syncope [ ] weakness [ ] numbness  Psychiatric:           [ x] negative [ ] anxiety [ ] depression  Endocrine:            [ x] negative [ ] diabetes [ ] thyroid problem  Heme/Lymph:      [ x] negative [ ] anemia [ ] bleeding problem  Allergic/Immune: [ x] negative [ ] itchy eyes [ ] nasal discharge [ ] hives [ ] angioedema    [ x] All other systems negative  [ ] Unable to assess ROS due to    Current Meds:  apixaban 5 milliGRAM(s) Oral every 12 hours  aspirin enteric coated 81 milliGRAM(s) Oral daily  atorvastatin 40 milliGRAM(s) Oral at bedtime  diltiazem    Tablet 90 milliGRAM(s) Oral every 6 hours  diltiazem Injectable 20 milliGRAM(s) IV Push once  influenza   Vaccine 0.5 milliLiter(s) IntraMuscular once  melatonin 3 milliGRAM(s) Oral at bedtime      PAST MEDICAL & SURGICAL HISTORY:  Urinary tract infection associated with catheterization of urinary tract, unspecified indwelling urinary catheter type, subsequent encounter  S/P urological surgery      Vitals:  T(F): 98.2 (11-13), Max: 99.1 (11-12)  HR: 82 (11-13) (60 - 120)  BP: 107/65 (11-13) (98/70 - 123/68)  RR: 18 (11-13)  SpO2: 95% (11-13)  I&O's Summary    12 Nov 2019 07:01  -  13 Nov 2019 07:00  --------------------------------------------------------  IN: 480 mL / OUT: 900 mL / NET: -420 mL    13 Nov 2019 07:01  -  13 Nov 2019 11:37  --------------------------------------------------------  IN: 360 mL / OUT: 400 mL / NET: -40 mL        Physical Exam:  Appearance: No acute distress; well appearing  Eyes: PERRL, EOMI, pink conjunctiva  HENT: Normal oral mucosa  Cardiovascular: irregular, tachycardic, normals s1/s2 no m/r/g; trace edema b/l moderately elevated JVP  Respiratory: bibasilar crackles  Gastrointestinal: soft, non-tender, non-distended with normal bowel sounds  Musculoskeletal: No clubbing; no joint deformity   Neurologic: Non-focal  Lymphatic: No lymphadenopathy  Psychiatry: AAOx3, mood & affect appropriate  Skin: No rashes, ecchymoses, or cyanosis                          10.8   10.83 )-----------( 341      ( 13 Nov 2019 10:16 )             33.7     11-13    137  |  102  |  22  ----------------------------<  134<H>  3.9   |  22  |  0.97    Ca    8.4      13 Nov 2019 06:32  Phos  3.0     11-13  Mg     1.9     11-13    TPro  6.5  /  Alb  3.0<L>  /  TBili  0.8  /  DBili  x   /  AST  94<H>  /  ALT  103<H>  /  AlkPhos  218<H>  11-12      CARDIAC MARKERS ( 12 Nov 2019 08:21 )  x     / x     / 75 U/L / x     / 2.1 ng/mL  CARDIAC MARKERS ( 12 Nov 2019 04:43 )  x     / x     / 74 U/L / x     / 1.8 ng/mL      Serum Pro-Brain Natriuretic Peptide: 3206 pg/mL (11-06 @ 17:29)    New ECG(s): Personally reviewed    Echo: 11/6/19  Observations:  Mitral Valve: Mitral annular calcification, otherwise  normal mitral valve. Minimal mitral regurgitation.  Aortic Valve/Aorta: Aortic valve not well visualized;  appears calcified. Peak transaortic valve gradient equals  10 mm Hg, estimated aortic valve area equals 2.8 sqcm.  Minimal aortic regurgitation.  Peak left ventricular  outflow tract gradient equals 7 mm Hg.  Aortic Root: 3.1 cm.  Left Atrium: Normal left atrium.  LA volume index = 29  cc/m2.  Left Ventricle: Overall preserved left ventricular ejection  fraction despite segmental wall motion abnormalities.  Endocardial visualization enhanced with intravenous  injection of Ultrasonic Enhancing Agent (Definity). The  basal  inferolateral wall, the basal anterolateral wall,  and the mid anterolateral wall are hypokinetic. The basal  inferior wall, and the mid inferior wall are akinetic.  Normal left ventricular internal dimensions and wall  thicknesses.  Right Heart: Mild right atrial enlargement. Theright  ventricle is not well visualized; grossly Right ventricular  enlargement with normal right ventricular systolic  function. Normal tricuspid valve. Moderate tricuspid  regurgitation. Pulmonic valve not well visualized. Minimal  pulmonic regurgitation.  Pericardium/Pleura: Normal pericardium with no pericardial  effusion.  Hemodynamic: Estimated right atrial pressure is 8 mm Hg.  Estimated right ventricular systolic pressure equals 35 mm  Hg, assuming right atrial pressure equals 8 mm Hg,  consistent with borderline pulmonary hypertension.      Interpretation of Telemetry: afib 90-110s

## 2019-11-13 NOTE — PROGRESS NOTE ADULT - PROBLEM SELECTOR PLAN 1
New afib since 11/9. Persistent afib ON on 11/12. Source likely pulmonic in nature, ?component of undiagnosed MARY given large neck circumference   - CHADSVASC score 1, will be 2 in Dec   - s/p cardizem 10mgx1, 20mg x2, amiodarone 150mg x1, and cardizem gtt 11/12  - s/p 500 cc NS bolus for low SBP 11/12  - c/w diltiazem 60mg PO q6 hrs  - c/w 5 mg eliquis BID  - cardiology recs appreciated New afib since 11/9. Persistent afib ON on 11/12. Source likely pulmonic in nature, ?component of undiagnosed MARY given large neck circumference   - CHADSVASC score 1, will be 2 in Dec   - s/p cardizem 10mgx1, 20mg x2, amiodarone 150mg x1, and cardizem gtt 11/12  - s/p 500 cc NS bolus for low SBP 11/12  - s/p diltiazem 60mg PO q6 hrs 11/12-11/13  - cardiology recs appreciated: c/w metoprolol 25 mg q6hrs; 5 mg IV PRN HR >140  - c/w 5 mg eliquis BID

## 2019-11-14 ENCOUNTER — TRANSCRIPTION ENCOUNTER (OUTPATIENT)
Age: 74
End: 2019-11-14

## 2019-11-14 VITALS
RESPIRATION RATE: 18 BRPM | DIASTOLIC BLOOD PRESSURE: 61 MMHG | TEMPERATURE: 98 F | HEART RATE: 101 BPM | OXYGEN SATURATION: 95 % | SYSTOLIC BLOOD PRESSURE: 105 MMHG

## 2019-11-14 LAB
ANION GAP SERPL CALC-SCNC: 9 MMOL/L — SIGNIFICANT CHANGE UP (ref 5–17)
BUN SERPL-MCNC: 25 MG/DL — HIGH (ref 7–23)
CALCIUM SERPL-MCNC: 8.8 MG/DL — SIGNIFICANT CHANGE UP (ref 8.4–10.5)
CHLORIDE SERPL-SCNC: 102 MMOL/L — SIGNIFICANT CHANGE UP (ref 96–108)
CO2 SERPL-SCNC: 24 MMOL/L — SIGNIFICANT CHANGE UP (ref 22–31)
CREAT SERPL-MCNC: 1.09 MG/DL — SIGNIFICANT CHANGE UP (ref 0.5–1.3)
CULTURE RESULTS: SIGNIFICANT CHANGE UP
CULTURE RESULTS: SIGNIFICANT CHANGE UP
GLUCOSE SERPL-MCNC: 135 MG/DL — HIGH (ref 70–99)
MAGNESIUM SERPL-MCNC: 1.7 MG/DL — SIGNIFICANT CHANGE UP (ref 1.6–2.6)
PHOSPHATE SERPL-MCNC: 3.3 MG/DL — SIGNIFICANT CHANGE UP (ref 2.5–4.5)
POTASSIUM SERPL-MCNC: 3.9 MMOL/L — SIGNIFICANT CHANGE UP (ref 3.5–5.3)
POTASSIUM SERPL-SCNC: 3.9 MMOL/L — SIGNIFICANT CHANGE UP (ref 3.5–5.3)
SODIUM SERPL-SCNC: 135 MMOL/L — SIGNIFICANT CHANGE UP (ref 135–145)
SPECIMEN SOURCE: SIGNIFICANT CHANGE UP
SPECIMEN SOURCE: SIGNIFICANT CHANGE UP
T3 SERPL-MCNC: 71 NG/DL — LOW (ref 80–200)
T4 AB SER-ACNC: 6.4 UG/DL — SIGNIFICANT CHANGE UP (ref 4.6–12)
TSH SERPL-MCNC: 6.54 UIU/ML — HIGH (ref 0.27–4.2)

## 2019-11-14 PROCEDURE — 99233 SBSQ HOSP IP/OBS HIGH 50: CPT | Mod: GC

## 2019-11-14 PROCEDURE — 97116 GAIT TRAINING THERAPY: CPT

## 2019-11-14 PROCEDURE — 87633 RESP VIRUS 12-25 TARGETS: CPT

## 2019-11-14 PROCEDURE — 82330 ASSAY OF CALCIUM: CPT

## 2019-11-14 PROCEDURE — 80307 DRUG TEST PRSMV CHEM ANLYZR: CPT

## 2019-11-14 PROCEDURE — 82553 CREATINE MB FRACTION: CPT

## 2019-11-14 PROCEDURE — 85379 FIBRIN DEGRADATION QUANT: CPT

## 2019-11-14 PROCEDURE — 87040 BLOOD CULTURE FOR BACTERIA: CPT

## 2019-11-14 PROCEDURE — 82435 ASSAY OF BLOOD CHLORIDE: CPT

## 2019-11-14 PROCEDURE — 84436 ASSAY OF TOTAL THYROXINE: CPT

## 2019-11-14 PROCEDURE — 94640 AIRWAY INHALATION TREATMENT: CPT

## 2019-11-14 PROCEDURE — 99232 SBSQ HOSP IP/OBS MODERATE 35: CPT | Mod: GC

## 2019-11-14 PROCEDURE — 86850 RBC ANTIBODY SCREEN: CPT

## 2019-11-14 PROCEDURE — 83540 ASSAY OF IRON: CPT

## 2019-11-14 PROCEDURE — 85730 THROMBOPLASTIN TIME PARTIAL: CPT

## 2019-11-14 PROCEDURE — 87150 DNA/RNA AMPLIFIED PROBE: CPT

## 2019-11-14 PROCEDURE — 36600 WITHDRAWAL OF ARTERIAL BLOOD: CPT

## 2019-11-14 PROCEDURE — 84132 ASSAY OF SERUM POTASSIUM: CPT

## 2019-11-14 PROCEDURE — 84480 ASSAY TRIIODOTHYRONINE (T3): CPT

## 2019-11-14 PROCEDURE — 82803 BLOOD GASES ANY COMBINATION: CPT

## 2019-11-14 PROCEDURE — 80074 ACUTE HEPATITIS PANEL: CPT

## 2019-11-14 PROCEDURE — 85610 PROTHROMBIN TIME: CPT

## 2019-11-14 PROCEDURE — 93975 VASCULAR STUDY: CPT

## 2019-11-14 PROCEDURE — 83550 IRON BINDING TEST: CPT

## 2019-11-14 PROCEDURE — 84484 ASSAY OF TROPONIN QUANT: CPT

## 2019-11-14 PROCEDURE — C1887: CPT

## 2019-11-14 PROCEDURE — 82947 ASSAY GLUCOSE BLOOD QUANT: CPT

## 2019-11-14 PROCEDURE — 94660 CPAP INITIATION&MGMT: CPT

## 2019-11-14 PROCEDURE — 82550 ASSAY OF CK (CPK): CPT

## 2019-11-14 PROCEDURE — 99152 MOD SED SAME PHYS/QHP 5/>YRS: CPT

## 2019-11-14 PROCEDURE — 83605 ASSAY OF LACTIC ACID: CPT

## 2019-11-14 PROCEDURE — 80053 COMPREHEN METABOLIC PANEL: CPT

## 2019-11-14 PROCEDURE — 84295 ASSAY OF SERUM SODIUM: CPT

## 2019-11-14 PROCEDURE — 93458 L HRT ARTERY/VENTRICLE ANGIO: CPT

## 2019-11-14 PROCEDURE — 85014 HEMATOCRIT: CPT

## 2019-11-14 PROCEDURE — 93005 ELECTROCARDIOGRAM TRACING: CPT

## 2019-11-14 PROCEDURE — 87581 M.PNEUMON DNA AMP PROBE: CPT

## 2019-11-14 PROCEDURE — 82728 ASSAY OF FERRITIN: CPT

## 2019-11-14 PROCEDURE — 87486 CHLMYD PNEUM DNA AMP PROBE: CPT

## 2019-11-14 PROCEDURE — 80048 BASIC METABOLIC PNL TOTAL CA: CPT

## 2019-11-14 PROCEDURE — 85027 COMPLETE CBC AUTOMATED: CPT

## 2019-11-14 PROCEDURE — C1894: CPT

## 2019-11-14 PROCEDURE — 86901 BLOOD TYPING SEROLOGIC RH(D): CPT

## 2019-11-14 PROCEDURE — 86900 BLOOD TYPING SEROLOGIC ABO: CPT

## 2019-11-14 PROCEDURE — 97110 THERAPEUTIC EXERCISES: CPT

## 2019-11-14 PROCEDURE — 87086 URINE CULTURE/COLONY COUNT: CPT

## 2019-11-14 PROCEDURE — 83735 ASSAY OF MAGNESIUM: CPT

## 2019-11-14 PROCEDURE — C8929: CPT

## 2019-11-14 PROCEDURE — 87798 DETECT AGENT NOS DNA AMP: CPT

## 2019-11-14 PROCEDURE — C1769: CPT

## 2019-11-14 PROCEDURE — 83880 ASSAY OF NATRIURETIC PEPTIDE: CPT

## 2019-11-14 PROCEDURE — 71045 X-RAY EXAM CHEST 1 VIEW: CPT

## 2019-11-14 PROCEDURE — 97162 PT EVAL MOD COMPLEX 30 MIN: CPT

## 2019-11-14 PROCEDURE — 84100 ASSAY OF PHOSPHORUS: CPT

## 2019-11-14 PROCEDURE — 84443 ASSAY THYROID STIM HORMONE: CPT

## 2019-11-14 RX ORDER — FUROSEMIDE 40 MG
40 TABLET ORAL ONCE
Refills: 0 | Status: COMPLETED | OUTPATIENT
Start: 2019-11-14 | End: 2019-11-14

## 2019-11-14 RX ORDER — APIXABAN 2.5 MG/1
1 TABLET, FILM COATED ORAL
Qty: 0 | Refills: 0 | DISCHARGE
Start: 2019-11-14

## 2019-11-14 RX ORDER — FUROSEMIDE 40 MG
40 TABLET ORAL DAILY
Refills: 0 | Status: DISCONTINUED | OUTPATIENT
Start: 2019-11-15 | End: 2019-11-15

## 2019-11-14 RX ORDER — METOPROLOL TARTRATE 50 MG
50 TABLET ORAL
Refills: 0 | Status: DISCONTINUED | OUTPATIENT
Start: 2019-11-14 | End: 2019-11-14

## 2019-11-14 RX ORDER — METOPROLOL TARTRATE 50 MG
5 TABLET ORAL ONCE
Refills: 0 | Status: COMPLETED | OUTPATIENT
Start: 2019-11-14 | End: 2019-11-14

## 2019-11-14 RX ORDER — METOPROLOL TARTRATE 50 MG
1 TABLET ORAL
Qty: 0 | Refills: 0 | DISCHARGE
Start: 2019-11-14

## 2019-11-14 RX ORDER — FUROSEMIDE 40 MG
40 TABLET ORAL
Qty: 0 | Refills: 0 | DISCHARGE
Start: 2019-11-14

## 2019-11-14 RX ORDER — METOPROLOL TARTRATE 50 MG
75 TABLET ORAL
Refills: 0 | Status: DISCONTINUED | OUTPATIENT
Start: 2019-11-14 | End: 2019-11-15

## 2019-11-14 RX ADMIN — Medication 5 MILLIGRAM(S): at 14:42

## 2019-11-14 RX ADMIN — Medication 40 MILLIGRAM(S): at 21:48

## 2019-11-14 RX ADMIN — Medication 25 MILLIGRAM(S): at 00:54

## 2019-11-14 RX ADMIN — ATORVASTATIN CALCIUM 40 MILLIGRAM(S): 80 TABLET, FILM COATED ORAL at 21:48

## 2019-11-14 RX ADMIN — Medication 25 MILLIGRAM(S): at 13:47

## 2019-11-14 RX ADMIN — Medication 40 MILLIGRAM(S): at 09:20

## 2019-11-14 RX ADMIN — Medication 25 MILLIGRAM(S): at 05:59

## 2019-11-14 RX ADMIN — Medication 75 MILLIGRAM(S): at 17:44

## 2019-11-14 RX ADMIN — APIXABAN 5 MILLIGRAM(S): 2.5 TABLET, FILM COATED ORAL at 17:44

## 2019-11-14 RX ADMIN — APIXABAN 5 MILLIGRAM(S): 2.5 TABLET, FILM COATED ORAL at 05:59

## 2019-11-14 RX ADMIN — Medication 3 MILLIGRAM(S): at 21:47

## 2019-11-14 RX ADMIN — Medication 81 MILLIGRAM(S): at 13:46

## 2019-11-14 NOTE — DISCHARGE NOTE NURSING/CASE MANAGEMENT/SOCIAL WORK - PATIENT PORTAL LINK FT
You can access the FollowMyHealth Patient Portal offered by [facility] by registering at the following website: [email or web address]. By joining [employer]’s FollowMyHealth portal, you will also be able to view your health information using other applications (apps) compatible with our system.

## 2019-11-14 NOTE — PROGRESS NOTE ADULT - ATTENDING COMMENTS
Patient interviewed and examined. I was physically present for the essential portions of the E/M service provided.  Chart reviewed and note edited where appropriate.  Case discussed with fellow.  Agree w/ Assessment and Plan as outlined.  >5sec pause in AFL @~0830. Denies any sxs/distress at that time.  VR in AFL has been ~116.  Will ask EP to see vis-a-vis LAMAR guided DCCV.    Charly Razo MD [facility]  Spectra:  [phone]  Office: [phone]

## 2019-11-14 NOTE — PROGRESS NOTE ADULT - PROBLEM SELECTOR PLAN 5
BCx growing coag neg staph 1/2 bottles, likely contaminant. Patient has a distant hx of IVDU.   - s/p 1 g vancomycin 11/9   - repeat BCx 11/9 NGTD

## 2019-11-14 NOTE — PROGRESS NOTE ADULT - SUBJECTIVE AND OBJECTIVE BOX
Patient seen and examined at bedside.    Overnight Events:   no events overnight.  Reports interval improvement in dyspnea.  No other active complaints.  Denies CP, palpitations, SOB.      REVIEW OF SYSTEMS:  Constitutional:     [x ] negative [ ] fevers [ ] chills [ ] weight loss [ ] weight gain  HEENT:                  [x ] negative [ ] dry eyes [ ] eye irritation [ ] postnasal drip [ ] nasal congestion  CV:                         [ x] negative  [ ] chest pain [ ] orthopnea [ ] palpitations [ ] murmur  Resp:                     [ x] negative [ ] cough [ ] shortness of breath [ ] dyspnea [ ] wheezing [ ] sputum [ ]hemoptysis  GI:                          [ x] negative [ ] nausea [ ] vomiting [ ] diarrhea [ ] constipation [ ] abd pain [ ] dysphagia   :                        [ x] negative [ ] dysuria [ ] nocturia [ ] hematuria [ ] increased urinary frequency  Musculoskeletal: [ x] negative [ ] back pain [ ] myalgias [ ] arthralgias [ ] fracture  Skin:                       [ x] negative [ ] rash [ ] itch  Neurological:        [x ] negative [ ] headache [ ] dizziness [ ] syncope [ ] weakness [ ] numbness  Psychiatric:           [ x] negative [ ] anxiety [ ] depression  Endocrine:            [ x] negative [ ] diabetes [ ] thyroid problem  Heme/Lymph:      [ x] negative [ ] anemia [ ] bleeding problem  Allergic/Immune: [ x] negative [ ] itchy eyes [ ] nasal discharge [ ] hives [ ] angioedema    [ x] All other systems negative  [ ] Unable to assess ROS due to    Current Meds:  apixaban 5 milliGRAM(s) Oral every 12 hours  aspirin enteric coated 81 milliGRAM(s) Oral daily  atorvastatin 40 milliGRAM(s) Oral at bedtime  influenza   Vaccine 0.5 milliLiter(s) IntraMuscular once  melatonin 3 milliGRAM(s) Oral at bedtime  metoprolol tartrate 25 milliGRAM(s) Oral every 6 hours      PAST MEDICAL & SURGICAL HISTORY:  Urinary tract infection associated with catheterization of urinary tract, unspecified indwelling urinary catheter type, subsequent encounter  S/P urological surgery      Vitals:  T(F): 98 (11-14), Max: 98.4 (11-13)  HR: 102 (11-14) (64 - 148)  BP: 123/72 (11-14) (90/57 - 133/74)  RR: 20 (11-14)  SpO2: 97% (11-14)  I&O's Summary    13 Nov 2019 07:01  -  14 Nov 2019 07:00  --------------------------------------------------------  IN: 360 mL / OUT: 1300 mL / NET: -940 mL        Physical Exam:  Appearance: No acute distress; well appearing  Eyes: PERRL, EOMI, pink conjunctiva  HENT: Normal oral mucosa  Cardiovascular: RRR, S1, S2, no murmurs, rubs, or gallops; no edema; no JVD  Respiratory: Clear to auscultation bilaterally  Gastrointestinal: soft, non-tender, non-distended with normal bowel sounds  Musculoskeletal: No clubbing; no joint deformity   Neurologic: Non-focal  Lymphatic: No lymphadenopathy  Psychiatry: AAOx3, mood & affect appropriate  Skin: No rashes, ecchymoses, or cyanosis                          10.8   10.83 )-----------( 341      ( 13 Nov 2019 10:16 )             33.7     11-14    135  |  102  |  25<H>  ----------------------------<  135<H>  3.9   |  24  |  1.09    Ca    8.8      14 Nov 2019 06:12  Phos  3.3     11-14  Mg     1.7     11-14    TPro  6.5  /  Alb  3.0<L>  /  TBili  0.8  /  DBili  x   /  AST  94<H>  /  ALT  103<H>  /  AlkPhos  218<H>  11-12                  New ECG(s): Personally reviewed    Echo:    Stress Testing:     Cath:    Imaging:    Interpretation of Telemetry: Patient seen and examined at bedside.    Overnight Events:   no events overnight.  Reports interval improvement in dyspnea.  No other active complaints.  Denies CP, palpitations, SOB.      REVIEW OF SYSTEMS:  Constitutional:     [x ] negative [ ] fevers [ ] chills [ ] weight loss [ ] weight gain  HEENT:                  [x ] negative [ ] dry eyes [ ] eye irritation [ ] postnasal drip [ ] nasal congestion  CV:                         [ x] negative  [ ] chest pain [ ] orthopnea [ ] palpitations [ ] murmur  Resp:                     [ x] negative [ ] cough [ ] shortness of breath [ ] dyspnea [ ] wheezing [ ] sputum [ ]hemoptysis  GI:                          [ x] negative [ ] nausea [ ] vomiting [ ] diarrhea [ ] constipation [ ] abd pain [ ] dysphagia   :                        [ x] negative [ ] dysuria [ ] nocturia [ ] hematuria [ ] increased urinary frequency  Musculoskeletal: [ x] negative [ ] back pain [ ] myalgias [ ] arthralgias [ ] fracture  Skin:                       [ x] negative [ ] rash [ ] itch  Neurological:        [x ] negative [ ] headache [ ] dizziness [ ] syncope [ ] weakness [ ] numbness  Psychiatric:           [ x] negative [ ] anxiety [ ] depression  Endocrine:            [ x] negative [ ] diabetes [ ] thyroid problem  Heme/Lymph:      [ x] negative [ ] anemia [ ] bleeding problem  Allergic/Immune: [ x] negative [ ] itchy eyes [ ] nasal discharge [ ] hives [ ] angioedema    [ x] All other systems negative  [ ] Unable to assess ROS due to    Current Meds:  apixaban 5 milliGRAM(s) Oral every 12 hours  aspirin enteric coated 81 milliGRAM(s) Oral daily  atorvastatin 40 milliGRAM(s) Oral at bedtime  influenza   Vaccine 0.5 milliLiter(s) IntraMuscular once  melatonin 3 milliGRAM(s) Oral at bedtime  metoprolol tartrate 25 milliGRAM(s) Oral every 6 hours      PAST MEDICAL & SURGICAL HISTORY:  Urinary tract infection associated with catheterization of urinary tract, unspecified indwelling urinary catheter type, subsequent encounter  S/P urological surgery      Vitals:  T(F): 98 (11-14), Max: 98.4 (11-13)  HR: 102 (11-14) (64 - 148)  BP: 123/72 (11-14) (90/57 - 133/74)  RR: 20 (11-14)  SpO2: 97% (11-14)  I&O's Summary    13 Nov 2019 07:01  -  14 Nov 2019 07:00  --------------------------------------------------------  IN: 360 mL / OUT: 1300 mL / NET: -940 mL        Physical Exam:  Appearance: No acute distress; well appearing  Eyes: PERRL, EOMI, pink conjunctiva  HENT: Normal oral mucosa  Cardiovascular: RRR, S1, S2, no murmurs, rubs, or gallops; no edema; no JVD  Respiratory: Clear to auscultation bilaterally  Gastrointestinal: soft, non-tender, non-distended with normal bowel sounds  Musculoskeletal: No clubbing; no joint deformity   Neurologic: Non-focal  Lymphatic: No lymphadenopathy  Psychiatry: AAOx3, mood & affect appropriate  Skin: No rashes, ecchymoses, or cyanosis                          10.8   10.83 )-----------( 341      ( 13 Nov 2019 10:16 )             33.7     11-14    135  |  102  |  25<H>  ----------------------------<  135<H>  3.9   |  24  |  1.09    Ca    8.8      14 Nov 2019 06:12  Phos  3.3     11-14  Mg     1.7     11-14    TPro  6.5  /  Alb  3.0<L>  /  TBili  0.8  /  DBili  x   /  AST  94<H>  /  ALT  103<H>  /  AlkPhos  218<H>  11-12      Interpretation of Telemetry:

## 2019-11-14 NOTE — PROVIDER CONTACT NOTE (CHANGE IN STATUS NOTIFICATION) - BACKGROUND
73YOM transfer from [facility] for fall then admitted for NSTEMI. Pt had cath showed  of RCA and new afib. HX: NSTEMI, UTI, Afib, acute resp failure, anemia.

## 2019-11-14 NOTE — PROGRESS NOTE ADULT - ASSESSMENT
73 yr old Male with PMHx of BPH, cocaine use, recurrent UTI([facility] hospitalization in 5/2019 for UTI/Sepsis), MRSA Bacteremia 2017 presented to ED from [facility] 11/4/19 after a fall, admitted for NSTEMI transferred to [facility] for R/LHC course c/b AHRF and new afib with RVR

## 2019-11-14 NOTE — PROVIDER CONTACT NOTE (CHANGE IN STATUS NOTIFICATION) - ACTION/TREATMENT ORDERED:
Aware, give ordered Lopressor Aware, give ordered Lopressor, will place addition Lopressor IVP and IV lasix Aware, give ordered Lopressor, will place addition Lopressor IVP and IV lasix, hot packs for hands and reassess Aware, give ordered Lopressor, will place addition Lopressor IVP, [place] lasix. Hot packs for hands and reassess

## 2019-11-14 NOTE — PROVIDER CONTACT NOTE (OTHER) - ACTION/TREATMENT ORDERED:
MD assessed and continued to monitor patient at bedside. EKG done, connected to R2 pads.
MD is aware, will monitor
ABG ordered, next shift to follow closely.
As per MD, continue to monitor off bipap.
As per MD, give dose of PO Cardizem now. Reschedule 6AM dose to 9AM based on last dose. Will continue to monitor.
DR last was just assessing pt when change in rhythm occurred, will continue to monitor
MD aware, MAR to bedside to assess patient. pending further action at this time
MD to bedside to assess patient. IV metoprolol ordered. will continue to monitor patient safety
NP assessed the pt. stat CBC ordered.will continue monitor.
Repeat blood cultures, give vancomycin  continue to monitor
S/B Josefina Garcia To give albuterol treatment, and will continue to monitor.

## 2019-11-14 NOTE — PROGRESS NOTE ADULT - PROBLEM SELECTOR PLAN 6
Patient with inc tachypnea to 33, hypoxia, pleuritic cp, and inc wob initially. CXR wnl.   - CXR 11/13 without sig edema or focal consolidation (pending final read)  - RVP negative  - duonebs d/andrew   - guaifenesin for cough PRN   - encourage incentive spirometry  - bipap at night Patient with inc tachypnea to 33, hypoxia, pleuritic cp, and inc wob initially. CXR wnl.   - CXR 11/13 without sig edema or focal consolidation   - RVP negative  - abdelrahman d/andrew   - guaifenesin for cough PRN   - encourage incentive spirometry  - bipap at night

## 2019-11-14 NOTE — PROGRESS NOTE ADULT - PROBLEM SELECTOR PLAN 3
HS trops elevated  3949 --> 5295 --> 4847 on admit. EKG at OSH sig for 0.5 ST elevation in inf leads and ST dep in V1-V2. New EKG showing nonspecific changes. r/o NSTEMI. 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 Brief run of afib 11/9 converted to NSR after metoprolol. s/p L/RHC 11/8 found to have  of RCA with subclinical dz of oLAD and dLAD   - c/w asa, clopidogrel d/andrew as on AC  - c/w atorvastatin 40 mg

## 2019-11-14 NOTE — PROVIDER CONTACT NOTE (CHANGE IN STATUS NOTIFICATION) - ASSESSMENT
Pt asymptomatic at this time. VS T: 98.2 BP: 109/61 HR:142 aflutter on tele RR:20 2lpm NC 98% Pt asymptomatic at this time. VS T: 98.2 BP: 109/61 HR:142 aflutter on tele RR:20 2lpm NC 98%. Noted cyanosis to finger tips, they are cold at this time. Pt asymptomatic at this time. L/s wheeze bilat. VS T: 98.2 BP: 109/61 HR:142 aflutter on tele RR:20 2lpm NC 98%. Noted cyanosis to finger tips, they are cold at this time (no distress at this time).

## 2019-11-14 NOTE — PROGRESS NOTE ADULT - SUBJECTIVE AND OBJECTIVE BOX
***************************************************************  Dr. Josefina Melissa  Internal Medicine   [facility] Pager: [phone]  [facility] Pager: [phone]   ***************************************************************    ALEX DE LEON  73y  MRN: [id number]    Subjective:    Patient is a 73y old  Male who presents with a chief complaint of STEMI (13 Nov 2019 11:37)      Interval history/overnight events:      MEDICATIONS  (STANDING):  apixaban 5 milliGRAM(s) Oral every 12 hours  aspirin enteric coated 81 milliGRAM(s) Oral daily  atorvastatin 40 milliGRAM(s) Oral at bedtime  influenza   Vaccine 0.5 milliLiter(s) IntraMuscular once  melatonin 3 milliGRAM(s) Oral at bedtime  metoprolol tartrate 25 milliGRAM(s) Oral every 6 hours    MEDICATIONS  (PRN):        Objective:    Vitals: Vital Signs Last 24 Hrs  T(C): 36.4 (11-14-19 @ 04:38), Max: 36.9 (11-13-19 @ 20:31)  T(F): 97.6 (11-14-19 @ 04:38), Max: 98.4 (11-13-19 @ 20:31)  HR: 106 (11-14-19 @ 05:36) (64 - 148)  BP: 133/74 (11-14-19 @ 04:38) (90/57 - 133/74)  BP(mean): --  RR: 18 (11-14-19 @ 04:38) (17 - 18)  SpO2: 97% (11-14-19 @ 05:36) (94% - 99%)            I&O's Summary    12 Nov 2019 07:01  -  13 Nov 2019 07:00  --------------------------------------------------------  IN: 480 mL / OUT: 900 mL / NET: -420 mL    13 Nov 2019 07:01  -  14 Nov 2019 06:56  --------------------------------------------------------  IN: 360 mL / OUT: 1300 mL / NET: -940 mL        LABS:    11-14    135  |  102  |  25<H>  ----------------------------<  135<H>  3.9   |  24  |  1.09  11-13    137  |  102  |  22  ----------------------------<  134<H>  3.9   |  22  |  0.97  11-12    137  |  101  |  22  ----------------------------<  141<H>  3.9   |  25  |  1.02    Ca    8.8      14 Nov 2019 06:12  Ca    8.4      13 Nov 2019 06:32  Ca    8.8      12 Nov 2019 11:20  Phos  3.3     11-14  Mg     1.7     11-14    TPro  6.5  /  Alb  3.0<L>  /  TBili  0.8  /  DBili  x   /  AST  94<H>  /  ALT  103<H>  /  AlkPhos  218<H>  11-12                                            10.8   10.83 )-----------( 341      ( 13 Nov 2019 10:16 )             33.7                         9.7    10.33 )-----------( 283      ( 12 Nov 2019 04:43 )             29.8     CAPILLARY BLOOD GLUCOSE              RADIOLOGY & ADDITIONAL TESTS:            Imaging Personally Reviewed:  [ ] YES  [ ] NO    Consultants involved in case:   Consultant(s) Notes Reviewed:  [ ] YES  [ ] NO:   Care Discussed with Consultants/Other Providers [ ] YES  [ ] NO ***************************************************************  Dr. Josefina Melissa  Internal Medicine   [facility] Pager: [phone]  [facility] Pager: [phone]   ***************************************************************    ALEX DE LEON  73y  MRN: [id number]    Subjective:    Patient is a 73y old  Male who presents with a chief complaint of STEMI (13 Nov 2019 11:37)      Interval history/overnight events:    Declined bipap ON (does not allow him to sleep). Remains in aflutter 80s-100s on tele. Endorses some SOB, no cp, heart palpitations, abdominal pain.       MEDICATIONS  (STANDING):  apixaban 5 milliGRAM(s) Oral every 12 hours  aspirin enteric coated 81 milliGRAM(s) Oral daily  atorvastatin 40 milliGRAM(s) Oral at bedtime  influenza   Vaccine 0.5 milliLiter(s) IntraMuscular once  melatonin 3 milliGRAM(s) Oral at bedtime  metoprolol tartrate 25 milliGRAM(s) Oral every 6 hours    MEDICATIONS  (PRN):        Objective:    Vitals: Vital Signs Last 24 Hrs  T(C): 36.4 (11-14-19 @ 04:38), Max: 36.9 (11-13-19 @ 20:31)  T(F): 97.6 (11-14-19 @ 04:38), Max: 98.4 (11-13-19 @ 20:31)  HR: 106 (11-14-19 @ 05:36) (64 - 148)  BP: 133/74 (11-14-19 @ 04:38) (90/57 - 133/74)  BP(mean): --  RR: 18 (11-14-19 @ 04:38) (17 - 18)  SpO2: 97% (11-14-19 @ 05:36) (94% - 99%)            I&O's Summary    12 Nov 2019 07:01  -  13 Nov 2019 07:00  --------------------------------------------------------  IN: 480 mL / OUT: 900 mL / NET: -420 mL    13 Nov 2019 07:01  -  14 Nov 2019 06:56  --------------------------------------------------------  IN: 360 mL / OUT: 1300 mL / NET: -940 mL    Physical exam:  General: elderly appearing in distress press-lipped breathing, on 2L   HEENT: 2x2 cm ecchymosis on left frontotemporal scalp s/p fall, PERRL  Lungs: lungs CTAB, no wheezing, not using accessory mm to breathe   Cardiac: RRR, no murmurs, gallops, rubs, -JVD, +HJR   Abdomen: soft, slightly distended, normoactive, no suprapubic tenderness, no rebound, guarding, rigidity, negative CVA bl  Neurological: A&O x 3, strength 5/5 upper and lower extremities with gross sense of touch intact   MSK: not accessed   Skin: large ecchymoses at left lateral hip, left inguinal region at prior fem cath site, no indurance suggestive of hematoma, stable from prior   Extremity: non-pitting edema noted bl; LLE PT, DP pulses 2+ bl, RRP 2+       LABS:    11-14    135  |  102  |  25<H>  ----------------------------<  135<H>  3.9   |  24  |  1.09  11-13    137  |  102  |  22  ----------------------------<  134<H>  3.9   |  22  |  0.97  11-12    137  |  101  |  22  ----------------------------<  141<H>  3.9   |  25  |  1.02    Ca    8.8      14 Nov 2019 06:12  Ca    8.4      13 Nov 2019 06:32  Ca    8.8      12 Nov 2019 11:20  Phos  3.3     11-14  Mg     1.7     11-14    TPro  6.5  /  Alb  3.0<L>  /  TBili  0.8  /  DBili  x   /  AST  94<H>  /  ALT  103<H>  /  AlkPhos  218<H>  11-12                                            10.8   10.83 )-----------( 341      ( 13 Nov 2019 10:16 )             33.7                         9.7    10.33 )-----------( 283      ( 12 Nov 2019 04:43 )             29.8     CAPILLARY BLOOD GLUCOSE              RADIOLOGY & ADDITIONAL TESTS:            Imaging Personally Reviewed:  [ ] YES  [ ] NO    Consultants involved in case:   Consultant(s) Notes Reviewed:  [ ] YES  [ ] NO:   Care Discussed with Consultants/Other Providers [ ] YES  [ ] NO ***************************************************************  Dr. Josefina Melissa  Internal Medicine   [facility] Pager: [phone]  [facility] Pager: [phone]   ***************************************************************    ALEX DE LEON  73y  MRN: [id number]    Subjective:    Patient is a 73y old  Male who presents with a chief complaint of STEMI (13 Nov 2019 11:37)      Interval history/overnight events:    Declined bipap ON (does not allow him to sleep). Remains in aflutter 80s-100s on tele. Endorses some SOB, no cp, heart palpitations, abdominal pain.       MEDICATIONS  (STANDING):  apixaban 5 milliGRAM(s) Oral every 12 hours  aspirin enteric coated 81 milliGRAM(s) Oral daily  atorvastatin 40 milliGRAM(s) Oral at bedtime  influenza   Vaccine 0.5 milliLiter(s) IntraMuscular once  melatonin 3 milliGRAM(s) Oral at bedtime  metoprolol tartrate 25 milliGRAM(s) Oral every 6 hours    MEDICATIONS  (PRN):        Objective:    Vitals: Vital Signs Last 24 Hrs  T(C): 36.4 (11-14-19 @ 04:38), Max: 36.9 (11-13-19 @ 20:31)  T(F): 97.6 (11-14-19 @ 04:38), Max: 98.4 (11-13-19 @ 20:31)  HR: 106 (11-14-19 @ 05:36) (64 - 148)  BP: 133/74 (11-14-19 @ 04:38) (90/57 - 133/74)  BP(mean): --  RR: 18 (11-14-19 @ 04:38) (17 - 18)  SpO2: 97% (11-14-19 @ 05:36) (94% - 99%)            I&O's Summary    12 Nov 2019 07:01  -  13 Nov 2019 07:00  --------------------------------------------------------  IN: 480 mL / OUT: 900 mL / NET: -420 mL    13 Nov 2019 07:01  -  14 Nov 2019 06:56  --------------------------------------------------------  IN: 360 mL / OUT: 1300 mL / NET: -940 mL    Physical exam:  General: elderly appearing in distress press-lipped breathing, on 2L   HEENT: 2x2 cm ecchymosis on left frontotemporal scalp s/p fall, PERRL  Lungs: lungs CTAB, no wheezing, not using accessory mm to breathe   Cardiac: RRR, no murmurs, gallops, rubs, -JVD, +HJR   Abdomen: soft, slightly distended, normoactive, no suprapubic tenderness, no rebound, guarding, rigidity, negative CVA bl  Neurological: A&O x 3, strength 5/5 upper and lower extremities with gross sense of touch intact   MSK: not accessed   Skin: large ecchymoses at left lateral hip, left inguinal region at prior fem cath site, no indurance suggestive of hematoma, stable from prior   Extremity: trace dep pedal edema noted bl; LLE PT, DP pulses 2+ bl, RRP 2+       LABS:    11-14    135  |  102  |  25<H>  ----------------------------<  135<H>  3.9   |  24  |  1.09  11-13    137  |  102  |  22  ----------------------------<  134<H>  3.9   |  22  |  0.97  11-12    137  |  101  |  22  ----------------------------<  141<H>  3.9   |  25  |  1.02    Ca    8.8      14 Nov 2019 06:12  Ca    8.4      13 Nov 2019 06:32  Ca    8.8      12 Nov 2019 11:20  Phos  3.3     11-14  Mg     1.7     11-14    TPro  6.5  /  Alb  3.0<L>  /  TBili  0.8  /  DBili  x   /  AST  94<H>  /  ALT  103<H>  /  AlkPhos  218<H>  11-12                                            10.8   10.83 )-----------( 341      ( 13 Nov 2019 10:16 )             33.7                         9.7    10.33 )-----------( 283      ( 12 Nov 2019 04:43 )             29.8     CAPILLARY BLOOD GLUCOSE              RADIOLOGY & ADDITIONAL TESTS:            Imaging Personally Reviewed:  [ ] YES  [ ] NO    Consultants involved in case:   Consultant(s) Notes Reviewed:  [ ] YES  [ ] NO:   Care Discussed with Consultants/Other Providers [ ] YES  [ ] NO

## 2019-11-14 NOTE — PROGRESS NOTE ADULT - ASSESSMENT
73M with BPH and cocaine use a/w NSTEMI s/p LHC (oLAD 50%, mLAD with myocardial bridging, RCA ), now hospital course c/b new onset AFIB w/ RVR, now w/ ADHF.    #ADHF  -suspect multifactorial but concern for negative inotropic effect of cardizem  -improving w/ diuresis and BB  -c/w Lasix 40mg IV BID  -BB as below    #aflutter / afib w/ RVR  -rates improved on cardizem however showing signs of decompensated heart failure  -overall rates improved this AM; likely 2/2 improved ADHF  -switch Metoprolol 50mg BID  -can use lopressor 5mg q6h HR sustained > 120s  -c/w Eliquis 5mg BID  -would not aim for DCCV at this time; will need 3 weeks of AC and outpt follow up / reassessment    #NSTEMI  - would switch to ASA as single agent antiplatelet given will be on AC  - c/w atorvastatin 40mg QHS    Discussed w/ Dr. Dung Michael MD  Cardiology Fellow - PGY 4  Text or Call: [phone]  For all New Consults and Questions:  [email or web address]   Login: [email or web address]

## 2019-11-14 NOTE — PROVIDER CONTACT NOTE (OTHER) - RECOMMENDATIONS
MD made aware, assess patient at bedside, EKG, defibrillator connected
Educated patient. Notify MD.
Give PO Cardizem now?
To assess the patient.
continue to monitor and repeat blood cultures
continue to monitor pt
frequent sit assessment.
notify MD
notify MD for further recommendation
repositioned

## 2019-11-14 NOTE — PROGRESS NOTE ADULT - PROBLEM SELECTOR PLAN 2
TTE with EF 55-60%. Normal right heart systolic fxn. Left ventricular akinetic with normal fxn. Mod TR. Borderline pul HTN. Not on diuretics at home  - s/p lasix IV 40 prior   - overloaded on exam today   - s/p 80 mg IV lasix 11/13, net -940 cc  - IV lasix 40 mg as needed TTE with EF 55-60%. Normal right heart systolic fxn. Left ventricular akinetic with normal fxn. Mod TR. Borderline pul HTN. Not on diuretics at home  - overloaded on exam today, received 40 IV lasix   - s/p 80 mg IV lasix 11/13, net -940 cc   - IV lasix 40 mg as needed

## 2019-11-14 NOTE — PROVIDER CONTACT NOTE (OTHER) - ASSESSMENT
Patient initially denied CP, however progressed to 2/10 CP, 's /48.
/92   /mt  RR 18/mt no c/o chest pain or any distress, Pulse ox 98% on 2lt NC.
AAOx4. No complaints of palpitations, chest discomfort, etc. Other vital signs stable.
AAOx4. No complaints of shortness of breath or discomfort.
VSS, Patient denies chest pain. left groin and hip is soft and No tenderness.
alert, asymptomatic, vs as recorded
no s.s of acute distress noted, resting in bed comfortably
pt a&ox4, denies dizziness or lightheadedness. denies CP.
pt denies CP, SOB, or palpitations. /72
pt resting in bed comfortably eating breakfeast
resp. rate elevated

## 2019-11-14 NOTE — PROGRESS NOTE ADULT - PROBLEM SELECTOR PLAN 1
New afib since 11/9. Persistent afib ON on 11/12. Source likely pulmonic in nature, ?component of undiagnosed MARY given large neck circumference   - CHADSVASC score 1, will be 2 in Dec   - s/p cardizem 10mgx1, 20mg x2, amiodarone 150mg x1, and cardizem gtt 11/12  - s/p 500 cc NS bolus for low SBP 11/12  - s/p diltiazem 60mg PO q6 hrs 11/12-11/13  - cardiology recs appreciated: c/w metoprolol 25 mg q6hrs; 5 mg IV PRN HR >140  - c/w 5 mg eliquis BID New afib since 11/9. Persistent afib ON on 11/12. Source likely pulmonic in nature, ?component of undiagnosed MARY given large neck circumference   - CHADSVASC score 1, will be 2 in Dec   - s/p cardizem 10mgx1, 20mg x2, amiodarone 150mg x1, and cardizem gtt 11/12  - s/p 500 cc NS bolus for low SBP 11/12  - s/p diltiazem 60mg PO q6 hrs 11/12-11/13  - cardiology recs appreciated: c/w metoprolol po 50 mg BID; 5 mg IV PRN HR >120  - c/w 5 mg eliquis BID

## 2019-11-15 NOTE — CHART NOTE - NSCHARTNOTEFT_GEN_A_CORE
Pt transferred to [facility] on 11/14.       Hospital Course:   This is a 73 yr old Male with PMHx of BPH, recurrent UTI([facility] hospitalization in 5/2019 for UTI/Sepsis), MRSA Bacteremia 2017, cocaine use, presented to ED [facility] 11/4/19 after a fall. Patient reports chills and muscle aches the evening of 11/3/19. He had not received a flu shot this year and his grandson had a URI so he believed he may be coming down with the flu. He went to the bathroom and fell. Pt does not recall how he fell, does not know if he had LOC and hit his head. His daughter heard the sound of his fall, and found him awake and called EMS. At [facility], pt became hypotensive and had elevated cardiac enzymes. EKG showed RV infarct-0.5 mm ST elevations inferiorly and in V1-V2, ST Depressions laterally. TTE showed RV enlargement and dysfunction, LVEF 60-65%, RV volume overload, moderate to severe tricuspid regurgitation. Pt was admitted to the CCU. Urine toxicology was positive for Cocaine.  Urinalaysis positive for UTI- started on Ceftriaxone. Lactate was elevated to 3.1. TSH, folate, B12 WNL. CTH was negative. Heparin drip started, ASA/Plavix loaded and was transferred to [facility] for R/LHC. On admit, complained of substernal 6/10 cp. Pt's O2 sat dropped to 80s on room air, so he was placed on 3L NC. He denied fevers, medication changes, recent antibiotics, rhinorrhea, sore throat, n/v, abd pain, diarrhea, constipation, rashes, recent travel. Only medication is ibuprofen 200mg 2 tabs once a day.     Patient was found to have HS trops elevated at 3949 --> 5295 --> 4847. EKG showed nonspecific changes. Patient's cp and and elevated trops were considered likely 2/2 cocaine-induced cardiac injury. He was continued on heparin gtt and asa and plavix and started on atorvastatin 40 mg. Patient was found to be volume overloaded and received lasix 40 mg IV x 3 days 11/8 - 11/10. Patient underwent R/LHC on 11/8 via RRA revealing for  of likely non-dominant RCA as well as non occlusive dz of LAD (50%) and dLAD (50%).     Course was complicated by AHRF. RVP was negative. Likely in the setting of undetected respiratory viral infection given wheezing noted on exam vs contribution of volume overload. Patient received duonebs PRN and was weaned off O2 to RA on day of discharge. Course was further complicated by new afib. Patient had short runs of afib overnight on 11/9 up to 140 bpm treated with metoprolol 12.5 PO, metoprolol 5 mg IV, diltiazem 10 mg IV and then converted to NSR. Patient was not started on AC as CHADSVASC 1 and due to resolution of afib. Pt being monitored for respiratory status and afib with RVR. Currently controlled with diuretics and beta blockers.     Plan for EP procedure on 11/15 for LAMAR guided DCCV.     However, on night of 11/14 pt was transferred to [facility] per daughter and patient under care of Dr. Nino Toth. This transfer was unknown by primary team  team and not initiated by [facility]. Primary team was not contacted or communicated with by Dr. Toth. Team was unaware of plan for transfer until the transportation provided by [facility] arrived. Called Dr. Toth to confirm that he was taking patient at [facility], which he acknowledged. Risks of leaving hospital and plan for EP procedure were discussed and declined by daughter and patient. Pt with avss when leaving hospital, however, risks of afib with rvr, risk of cardiac injury, and pt's recent tachypnea all discussed. Pt was aware of the risks of leaving hospital and being transferred to someone else's care. All discharge paperwork sent with patient including hospital course, recent labs, recent vitals.     Events will be communicated with day team.

## 2020-03-05 NOTE — PROGRESS NOTE ADULT - PROBLEM SELECTOR PROBLEM 8
Subjective   63-year-old white male complains of nausea and vomiting.  Patient states that he was in his normal state of health until approximately 3 AM this morning when he awoke with nausea and vomiting.  He has had multiple episodes of vomiting since that time.  He also has diarrhea which started about an hour or so after his nausea and vomiting.  He has had multiple episodes of diarrhea since that time.  He complains of diffuse mild abdominal pain.  He denies any fever or chills.  He has history of COPD and was recently treated for COPD exacerbation with Zithromax, and is just finishing his course of that.  His grandson had a fever 2 days ago but no GI symptoms, and he has had no known contacts with GI symptoms.  No recent travel.          Review of Systems   All other systems reviewed and are negative.      Past Medical History:   Diagnosis Date   • Arthritis    • COPD (chronic obstructive pulmonary disease) (CMS/HCC)    • Depression    • Diabetes mellitus (CMS/HCC)    • Diverticulitis    • Dyslipidemia    • Emphysema (subcutaneous) (surgical) resulting from a procedure    • GERD (gastroesophageal reflux disease)    • Hypertension    • Neuropathy    • Pneumothorax     Left, status post chest tube       No Known Allergies    Past Surgical History:   Procedure Laterality Date   • BRONCHOSCOPY N/A 5/14/2019    Procedure: Bronchoscopy with Transbronchial Biopsy with Fluoroscopy;  Surgeon: Eladio Bethea MD;  Location: [facility];  Service: Pulmonary   • CHEST TUBE INSERTION Left     Pneumothorax   • COLON RESECTION  2016    had colostomy and reveresed back    • COLONOSCOPY  2016   • COLOSTOMY CLOSURE     • LUNG BIOPSY Right 2014    (non cancerous)   • OTHER SURGICAL HISTORY Left     LEFT ELBOW SURGERY       Family History   Problem Relation Age of Onset   • Alzheimer's disease Mother    • Cancer Father         THROAT CANCER   • Diabetes Sister    • Diabetes Brother        Social History     Socioeconomic History    • Marital status:      Spouse name: Not on file   • Number of children: 0   • Years of education: Not on file   • Highest education level: Not on file   Occupational History   • Occupation: DISABLED      Comment: DISABILITY FOR COPD   Tobacco Use   • Smoking status: Former Smoker     Packs/day: 1.00     Years: 45.00     Pack years: 45.00     Types: Cigarettes     Last attempt to quit:      Years since quittin.1   • Smokeless tobacco: Never Used   Substance and Sexual Activity   • Alcohol use: No   • Drug use: No   • Sexual activity: Defer     Birth control/protection: Other   Social History Narrative    LIVES IN [facility]           Objective   Physical Exam   Constitutional: He is oriented to person, place, and time. He appears well-developed and well-nourished.   HENT:   Head: Normocephalic and atraumatic.   Cardiovascular: Normal rate, regular rhythm and normal heart sounds. Exam reveals no gallop and no friction rub.   No murmur heard.  Pulmonary/Chest: Effort normal and breath sounds normal. No respiratory distress. He has no wheezes. He has no rales.   Abdominal: Soft. Bowel sounds are normal. He exhibits no distension. There is generalized tenderness (Mild). There is no rebound and no guarding.   Musculoskeletal: Normal range of motion. He exhibits no edema.   Neurological: He is alert and oriented to person, place, and time.   Skin: Skin is warm and dry.   Psychiatric: He has a normal mood and affect.   Nursing note and vitals reviewed.      Procedures  Results for orders placed or performed during the hospital encounter of 20   Blood Culture - Blood, Arm, Right   Result Value Ref Range    Blood Culture No growth at less than 24 hours    Blood Culture - Blood, Arm, Right   Result Value Ref Range    Blood Culture No growth at less than 24 hours    Influenza Antigen, Rapid - Swab, Nasopharynx   Result Value Ref Range    Influenza A Ag, EIA Negative Negative    Influenza B Ag, EIA  Negative Negative   Clostridium Difficile Toxin, PCR - Stool, Per Rectum   Result Value Ref Range    C. Difficile Toxins by PCR Negative Negative    027 Toxin Presumptive Negative    Gastrointestinal Panel, PCR - Stool, Per Rectum   Result Value Ref Range    Campylobacter Not Detected Not Detected    Plesiomonas shigelloides Not Detected Not Detected    Salmonella Not Detected Not Detected    Vibrio Not Detected Not Detected    Vibrio cholerae Not Detected Not Detected    Yersinia enterocolitica Not Detected Not Detected    Enteroaggregative E. coli (EAEC) Not Detected Not Detected    Enteropathogenic E. coli (EPEC) Not Detected Not Detected    Enterotoxigenic E. coli (ETEC) lt/st Not Detected Not Detected    Shiga-like toxin-producing E. coli (STEC) stx1/stx2 Not Detected Not Detected    E. coli O157 Not Detected Not Detected    Shigella/Enteroinvasive E. coli (EIEC) Not Detected Not Detected    Cryptosporidium Not Detected Not Detected    Cyclospora cayetanensis Not Detected Not Detected    Entamoeba histolytica Not Detected Not Detected    Giardia lamblia Not Detected Not Detected    Adenovirus F40/41 Not Detected Not Detected    Astrovirus Not Detected Not Detected    Norovirus GI/GII Detected (A) Not Detected    Rotavirus A Not Detected Not Detected    Sapovirus (I, II, IV or V) Not Detected Not Detected   Comprehensive Metabolic Panel   Result Value Ref Range    Glucose 335 (H) 65 - 99 mg/dL    BUN 30 (H) 8 - 23 mg/dL    Creatinine 1.08 0.76 - 1.27 mg/dL    Sodium 135 (L) 136 - 145 mmol/L    Potassium 4.0 3.5 - 5.2 mmol/L    Chloride 91 (L) 98 - 107 mmol/L    CO2 29.4 (H) 22.0 - 29.0 mmol/L    Calcium 9.5 8.6 - 10.5 mg/dL    Total Protein 7.6 6.0 - 8.5 g/dL    Albumin 4.69 3.50 - 5.20 g/dL    ALT (SGPT) 22 1 - 41 U/L    AST (SGOT) 14 1 - 40 U/L    Alkaline Phosphatase 83 39 - 117 U/L    Total Bilirubin 0.4 0.2 - 1.2 mg/dL    eGFR Non African Amer 69 >60 mL/min/1.73    Globulin 2.9 gm/dL    A/G Ratio 1.6 g/dL     BUN/Creatinine Ratio 27.8 (H) 7.0 - 25.0    Anion Gap 14.6 5.0 - 15.0 mmol/L   Lipase   Result Value Ref Range    Lipase 28 13 - 60 U/L   Urinalysis With Culture If Indicated - Urine, Clean Catch   Result Value Ref Range    Color, UA Yellow Yellow, Straw    Appearance, UA Cloudy (A) Clear    pH, UA <=5.0 5.0 - 8.0    Specific Gravity, UA 1.027 1.005 - 1.030    Glucose, UA >=1000 mg/dL (3+) (A) Negative    Ketones, UA Trace (A) Negative    Bilirubin, UA Negative Negative    Blood, UA Negative Negative    Protein, UA Negative Negative    Leuk Esterase, UA Negative Negative    Nitrite, UA Negative Negative    Urobilinogen, UA 0.2 E.U./dL 0.2 - 1.0 E.U./dL   Troponin   Result Value Ref Range    Troponin T <0.010 0.000 - 0.030 ng/mL   Fecal Lactoferrin - Stool, Per Rectum   Result Value Ref Range    Lactoferrin, Qual Positive (A) Negative   Occult Blood X 1, Stool - Stool, Per Rectum   Result Value Ref Range    Fecal Occult Blood Negative Negative   CBC Auto Differential   Result Value Ref Range    WBC 27.29 (H) 3.40 - 10.80 10*3/mm3    RBC 5.43 4.14 - 5.80 10*6/mm3    Hemoglobin 16.2 13.0 - 17.7 g/dL    Hematocrit 49.0 37.5 - 51.0 %    MCV 90.2 79.0 - 97.0 fL    MCH 29.8 26.6 - 33.0 pg    MCHC 33.1 31.5 - 35.7 g/dL    RDW 13.6 12.3 - 15.4 %    RDW-SD 44.4 37.0 - 54.0 fl    MPV 11.4 6.0 - 12.0 fL    Platelets 332 140 - 450 10*3/mm3   Blood Gas, Arterial With Co-Ox   Result Value Ref Range    Site Right Brachial     Max's Test N/A     pH, Arterial 7.466 (H) 7.350 - 7.450 pH units    pCO2, Arterial 34.3 (L) 35.0 - 45.0 mm Hg    pO2, Arterial 74.5 (L) 83.0 - 108.0 mm Hg    HCO3, Arterial 24.7 20.0 - 26.0 mmol/L    Base Excess, Arterial 1.5 0.0 - 2.0 mmol/L    O2 Saturation, Arterial 95.4 94.0 - 99.0 %    Hemoglobin, Blood Gas 16.2 14 - 18 g/dL    Hematocrit, Blood Gas 49.7 38.0 - 51.0 %    Oxyhemoglobin 94.4 94 - 99 %    Methemoglobin 0.30 0.00 - 3.00 %    Carboxyhemoglobin 0.8 0 - 5 %    A-a Gradiant 30.1 0.0 - 300.0  mmHg    CO2 Content 25.8 22 - 33 mmol/L    Temperature 0.0 C    Barometric Pressure for Blood Gas 729 mmHg    Modality Room Air     FIO2 21 %    Ventilator Mode NA     Note      Collected by [id number]     pH, Temp Corrected      pCO2, Temperature Corrected      pO2, Temperature Corrected     Scan Slide   Result Value Ref Range    Scan Slide     Lactic Acid, Plasma   Result Value Ref Range    Lactate 3.7 (C) 0.5 - 2.0 mmol/L   Lactic Acid, Reflex Timer (This will reflex a repeat order 3-3:15 hours after ordered.)   Result Value Ref Range    Hold Tube Hold for add-ons.    Manual Differential   Result Value Ref Range    Neutrophil % 77.0 (H) 42.7 - 76.0 %    Lymphocyte % 4.0 (L) 19.6 - 45.3 %    Monocyte % 4.0 (L) 5.0 - 12.0 %    Eosinophil % 1.0 0.3 - 6.2 %    Bands %  13.0 (H) 0.0 - 5.0 %    Metamyelocyte % 1.0 (H) 0.0 - 0.0 %    Neutrophils Absolute 24.56 (H) 1.70 - 7.00 10*3/mm3    Lymphocytes Absolute 1.09 0.70 - 3.10 10*3/mm3    Monocytes Absolute 1.09 (H) 0.10 - 0.90 10*3/mm3    Eosinophils Absolute 0.27 0.00 - 0.40 10*3/mm3    RBC Morphology Normal Normal    Platelet Morphology Normal Normal     Ct Abdomen Pelvis Without Contrast    Result Date: 3/5/2020  Narrative:  CT ABDOMEN PELVIS WO CONTRAST-  TECHNIQUE: Multiple axial CT images were obtained from lung bases through pubic symphysis without administration of IV contrast. Reformatted images in the coronal and/or sagittal plane(s) were generated from the axial data set to facilitate diagnostic accuracy and/or surgical planning. Oral Contrast:NONE.  Radiation dose reduction techniques were utilized per ALARA protocol. Automated exposure control was initiated through either or [employer] or [employer] software packages by  protocol.   1385.06 mGy.cm  Clinical information n/v, abd pain  Comparison NONE.  Findings LOWER THORAX: Clear. No effusions.  ABDOMEN:     LIVER: FATTY LIVER INFILTRATION.     GALLBLADDER: No dilation or stone identified.     PANCREAS:  Unremarkable. No mass or ductal dilatation.     SPLEEN: Homogeneous. No splenomegaly.     ADRENALS: No mass.     KIDNEYS: No mass. No obstructive uropathy.  No evidence of urolithiasis.     GI TRACT: Non-dilated. No definite wall thickening.     PERITONEUM: No free air. No free fluid or loculated fluid collections.     MESENTERY: Unremarkable.     LYMPH NODES: No lymphadenopathy.     VASCULATURE: No evidence of aneurysm.     ABDOMINAL WALL: ANTERIOR ABDOMINAL WALL VENTRAL HERNIA.     OTHER: None.  PELVIS:     BLADDER: No focal mass or significant wall thickening     REPRODUCTIVE: Unremarkable as visualized.     APPENDIX: Nondistended. No surrounding inflammation.  BONES: No acute bony abnormality.      Impression: Impression: 1. Unremarkable exam.  No source for the patient symptoms. 2. Other incidental/non-acute findings as above.  This report was finalized on 3/5/2020 10:30 AM by Dr. Wilfredo Lawrence MD.      Xr Chest 1 View    Result Date: 3/5/2020  Narrative: EXAMINATION: XR CHEST 1 VW-  CLINICAL INDICATION:     n/v  TECHNIQUE:  XR CHEST 1 VW-  COMPARISON: NONE  FINDINGS: The lungs remain aerated. Heart and mediastinum contours are unremarkable. No pleural effusion. No pneumothorax. Bony and soft tissue structures are unremarkable.      Impression: No radiographic evidence of acute cardiac or pulmonary disease.  This report was finalized on 3/5/2020 10:30 AM by Dr. Wilfredo Lawrence MD.               ED Course  ED Course as of Mar 06 0815   Thu Mar 05, 2020   1410 Patient states he is feeling much better after treatment.  Has not had any further diarrhea or vomiting since arriving in the ER.  Reviewed results of his work-up.  Is positive for norovirus.  His white blood cell count is elevated, without any obvious source of bacterial infection.  Will discharge home with antiemetics and continue clear liquid diet.  Ask him to recheck his CBC with his primary care doctor in the next 4 to 5 days.  He voices that he will  return to the ER for any further symptoms.    [BC]      ED Course User Index  [BC] Shon Ledesma MD                                           MDM  Number of Diagnoses or Management Options  Gastroenteritis due to norovirus:   Leukocytosis, unspecified type:      Amount and/or Complexity of Data Reviewed  Clinical lab tests: reviewed  Tests in the radiology section of CPT®: reviewed    Risk of Complications, Morbidity, and/or Mortality  Presenting problems: high  Diagnostic procedures: high  Management options: moderate        Final diagnoses:   Gastroenteritis due to norovirus   Leukocytosis, unspecified type            Shon Ledesma MD  03/06/20 0855     [place]

## 2020-07-06 NOTE — PROGRESS NOTE ADULT - PROBLEM SELECTOR PLAN 5
· Rest and increase fluids.   · May apply warm compresses as needed.   · Saline nasal spray or saline irrigation (Neti pot) to loosen nasal congestion.  · Flonase sensimist to reduce inflammation in the sinus cavities.  · Take Ala Hist PE as prescribed for allergy/sinus symptoms.   · Follow up with your primary care provider or with ENT if not improved within a few days or sooner for any new or worsening symptoms.   · Go to the ER for any fever that does not improve with Tylenol/Ibuprofen, neck stiffness, rash, severe headache, vision changes, shortness of breath, chest pain, severe facial pain or swelling, or for any other new and concerning symptoms.      TTE with EF 55-60%. Normal right heart systolic fxn. Left ventricular akinetic with normal fxn. Mod TR. Borderline pul HTN.  - not on diuretics at home  - c/w lasix 40 mg qd TTE with EF 55-60%. Normal right heart systolic fxn. Left ventricular akinetic with normal fxn. Mod TR. Borderline pul HTN.  - not on diuretics at home  - c/w lasix 40 mg qd x 1 dose, then d/c

## 2023-02-17 NOTE — PATIENT PROFILE ADULT - NSPROEDAABILITYLEARN_GEN_A_NUR
[address] [address]  Recreational Therapy  Discharge Note  Inpatient Rehabilitation Unit         Date:  2/17/2023       Patient Name: Abelardo Collado      MRN: [id number]       YOB: 1937 (79 y.o.)       Gender: female  Diagnosis: Vertigo as late effect of stroke  Referring Practitioner: Sarahi Anderson MD    Patient discharged from Recreational Therapy at this time. See recreational therapy notes for details.     Electronically signed by: JAYLA Flannery  Date: 2/17/2023 none

## 2023-03-20 NOTE — PATIENT PROFILE ADULT - NSPROSPHOSPCHAPLAINYN_GEN_A_NUR
Dutasteride Counseling: Dustasteride Counseling:  I discussed with the patient the risks of use of dutasteride including but not limited to decreased libido, decreased ejaculate volume, and gynecomastia. Women who can become pregnant should not handle medication.  All of the patient's questions and concerns were addressed. Dutasteride Male Counseling: Dustasteride Counseling:  I discussed with the patient the risks of use of dutasteride including but not limited to decreased libido, decreased ejaculate volume, and gynecomastia. Women who can become pregnant should not handle medication.  All of the patient's questions and concerns were addressed. no

## 2023-06-27 ENCOUNTER — INPATIENT (INPATIENT)
Facility: HOSPITAL | Age: 78
LOS: 8 days | Discharge: NURSING, REHAB OR LONG-TERM CARE | End: 2023-07-06
Attending: HOSPITALIST | Admitting: HOSPITALIST
Payer: MEDICARE

## 2023-06-27 VITALS
TEMPERATURE: 98 F | WEIGHT: 181.66 LBS | DIASTOLIC BLOOD PRESSURE: 70 MMHG | RESPIRATION RATE: 18 BRPM | HEART RATE: 90 BPM | SYSTOLIC BLOOD PRESSURE: 121 MMHG | HEIGHT: 71 IN | OXYGEN SATURATION: 99 %

## 2023-06-27 DIAGNOSIS — I51.9 HEART DISEASE, UNSPECIFIED: ICD-10-CM

## 2023-06-27 DIAGNOSIS — Z98.890 OTHER SPECIFIED POSTPROCEDURAL STATES: Chronic | ICD-10-CM

## 2023-06-27 LAB
APTT BLD: 38.4 SEC — HIGH (ref 27–36.3)
BASOPHILS # BLD AUTO: 0.04 K/UL — SIGNIFICANT CHANGE UP (ref 0–0.2)
BASOPHILS NFR BLD AUTO: 0.5 % — SIGNIFICANT CHANGE UP (ref 0–2)
EOSINOPHIL # BLD AUTO: 0.69 K/UL — HIGH (ref 0–0.5)
EOSINOPHIL NFR BLD AUTO: 7.9 % — HIGH (ref 0–6)
HCT VFR BLD CALC: 35.5 % — LOW (ref 39–50)
HGB BLD-MCNC: 11.2 G/DL — LOW (ref 13–17)
IANC: 5.42 K/UL — SIGNIFICANT CHANGE UP (ref 1.8–7.4)
IMM GRANULOCYTES NFR BLD AUTO: 0.3 % — SIGNIFICANT CHANGE UP (ref 0–0.9)
INR BLD: 1.72 RATIO — HIGH (ref 0.88–1.16)
LYMPHOCYTES # BLD AUTO: 1.84 K/UL — SIGNIFICANT CHANGE UP (ref 1–3.3)
LYMPHOCYTES # BLD AUTO: 21 % — SIGNIFICANT CHANGE UP (ref 13–44)
MCHC RBC-ENTMCNC: 31.5 GM/DL — LOW (ref 32–36)
MCHC RBC-ENTMCNC: 31.5 PG — SIGNIFICANT CHANGE UP (ref 27–34)
MCV RBC AUTO: 99.7 FL — SIGNIFICANT CHANGE UP (ref 80–100)
MONOCYTES # BLD AUTO: 0.76 K/UL — SIGNIFICANT CHANGE UP (ref 0–0.9)
MONOCYTES NFR BLD AUTO: 8.7 % — SIGNIFICANT CHANGE UP (ref 2–14)
NEUTROPHILS # BLD AUTO: 5.42 K/UL — SIGNIFICANT CHANGE UP (ref 1.8–7.4)
NEUTROPHILS NFR BLD AUTO: 61.6 % — SIGNIFICANT CHANGE UP (ref 43–77)
NRBC # BLD: 0 /100 WBCS — SIGNIFICANT CHANGE UP (ref 0–0)
NRBC # FLD: 0 K/UL — SIGNIFICANT CHANGE UP (ref 0–0)
PLATELET # BLD AUTO: 321 K/UL — SIGNIFICANT CHANGE UP (ref 150–400)
PROTHROM AB SERPL-ACNC: 20.1 SEC — HIGH (ref 10.5–13.4)
RBC # BLD: 3.56 M/UL — LOW (ref 4.2–5.8)
RBC # FLD: 14.1 % — SIGNIFICANT CHANGE UP (ref 10.3–14.5)
WBC # BLD: 8.78 K/UL — SIGNIFICANT CHANGE UP (ref 3.8–10.5)
WBC # FLD AUTO: 8.78 K/UL — SIGNIFICANT CHANGE UP (ref 3.8–10.5)

## 2023-06-27 RX ORDER — ACETAMINOPHEN 500 MG
650 TABLET ORAL EVERY 6 HOURS
Refills: 0 | Status: DISCONTINUED | OUTPATIENT
Start: 2023-06-27 | End: 2023-06-27

## 2023-06-27 RX ORDER — ONDANSETRON 8 MG/1
4 TABLET, FILM COATED ORAL EVERY 8 HOURS
Refills: 0 | Status: DISCONTINUED | OUTPATIENT
Start: 2023-06-27 | End: 2023-06-27

## 2023-06-27 RX ORDER — LANOLIN ALCOHOL/MO/W.PET/CERES
3 CREAM (GRAM) TOPICAL AT BEDTIME
Refills: 0 | Status: DISCONTINUED | OUTPATIENT
Start: 2023-06-27 | End: 2023-06-27

## 2023-06-27 NOTE — PATIENT PROFILE ADULT - FALL HARM RISK - UNIVERSAL INTERVENTIONS
Bed in lowest position, wheels locked, appropriate side rails in place/Call bell, personal items and telephone in reach/Instruct patient to call for assistance before getting out of bed or chair/Non-slip footwear when patient is out of bed/[place] to call system/Physically safe environment - no spills, clutter or unnecessary equipment/Purposeful Proactive Rounding/Room/bathroom lighting operational, light cord in reach

## 2023-06-27 NOTE — PATIENT PROFILE ADULT - FUNCTIONAL ASSESSMENT - BASIC MOBILITY 3.
__________________________________________________





Discharge Summary


Admission Date


Mar 3, 2018 at 13:50


Discharge Date:  Mar 5, 2018


Admitting Diagnosis


HYPHEMA AND INTRAORBITAL HEMORRHAGE RT EYE





(1) Hyphema, right eye


ICD Code:  H21.01 - Hyphema, right eye


Status:  Acute


(2) Supratherapeutic INR


ICD Code:  R79.1 - Abnormal coagulation profile


(3) CAD (coronary artery disease)


ICD Code:  I25.10 - Atherosclerotic heart disease of native coronary artery 

without angina pectoris


Status:  Chronic


(4) PAD (peripheral artery disease)


ICD Code:  I73.9 - Peripheral vascular disease, unspecified


Status:  Chronic


(5) Hypothyroidism


ICD Code:  E03.9 - Hypothyroidism, unspecified


Status:  Chronic


(6) Diabetes mellitus


ICD Code:  E11.9 - Type 2 diabetes mellitus without complications


Status:  Chronic


Procedures


No invasive procedures.


Brief History - From Admission


80 year old  male with history of CAD, IDDM, and PAD presented to the 

ER for right visual loss. He reports yesterday evening he spent a few hours 

squinting with his right eye trying to see the iPad and when he put it down he 

realized his vision was blurry. This morning when he awoke he states he 

completely had no vision in that eye. His wife commented that when she looked 

at his eyes the right pupil was dilated. The patient states at baseline he is 

legally blind and his right eye vision is worse than his left. He denies any 

diplopia, photophobia, headache, nausea, or vomiting. He states he had some 

mild tenderness when he touched his right eyelid this morning otherwise the eye 

is not painful. At baseline, though, he is a little unstable on his feet and 

gets dizzy from time to time but nothing that has acutely worsened. He is on 

Coumadin reportedly for multiple bypass procedures and PAD, and recalls his 

last INR was 2.8 somewhere between 2-4 weeks ago. He denies bleeding elsewhere. 

He states he did not fall or have any recent trauma. He reports since this 

morning his vision has improved but is still poor.





He moved to the area over the summer of 2017 and his cardiologist and 

endocrinologist are in [place].


CBC/BMP:  


3/4/18 0132                                                                    

            3/4/18 0132





Significant Findings





Laboratory Tests








Test


  3/3/18


10:05 3/4/18


01:32 3/4/18


13:10 3/5/18


08:15


 


Platelet Count


  123 TH/MM3


(150-450) 116 TH/MM3


(150-450) 


  


 


 


Neutrophils (%) (Auto)


  72.0 %


(16.0-70.0) 


  


  


 


 


Monocytes (%) (Auto)


  10.9 %


(0.0-8.0) 12.2 %


(0.0-8.0) 


  


 


 


Prothrombin Time


  34.6 SEC


(9.8-11.6) 16.7 SEC


(9.8-11.6) 16.6 SEC


(9.8-11.6) 14.9 SEC


(9.8-11.6)


 


Activated Partial


Thromboplast Time 42.5 SEC


(24.3-30.1) 


  


  


 


 


Blood Urea Nitrogen


  19 MG/DL


(7-18) 


  


  


 


 


Creatinine


  1.56 MG/DL


(0.60-1.30) 


  


  


 


 


Random Glucose


  247 MG/DL


() 183 MG/DL


() 


  


 


 


Sodium Level


  133 MEQ/L


(136-145) 


  


  


 


 


Estimat Glomerular Filtration


Rate 43 ML/MIN


(>89) 56 ML/MIN


(>89) 


  


 


 


Anion Gap  4 MEQ/L (5-15)   








Imaging





Last Impressions








Head CT 3/4/18 0000 Signed





Impressions: 





 Service Date/Time:  Sunday, March 4, 2018 13:26 - CONCLUSION: Atrophy 

otherwise 





 negative.  Orbits appear unremarkable     Dhruv Deshpande MD  FACR


 


Maxillofacial CT 3/3/18 0000 Signed





Impressions: 





 Service Date/Time:  Saturday, March 3, 2018 10:57 - CONCLUSION:  Abnormal soft 





 tissue presumably hemorrhage in the supralateral right globe anteriorly. Rest 

of 





 study is unremarkable.     Edilson Dubose MD ADDENDUM:   The hemorrhages 





 within the globe.   Edilson Dubose MD 








PE at Discharge


GENERAL: WN, WD elderly  male. Pleasant, sitting up comfortably in bed

, in no acute distress.


SKIN: Warm and dry. Few ecchymoses over upper extremities.


HEENT: AT/NC. Small hyphema right eye. Right pupil dilated to about 4-5 mm, 

left pupil about 2-3 mm. Left pupil reactive, right pupil nonreactive. Darker 

discoloration to right iris compared to left. Small hyphemia R chamber. Unable 

to assess accommodation as patient loses focus of my finger as it approaches 

closer to him. Extraocular motions intact. No scleral icterus or injection. 


NECK: No JVD or tender LAD.


CARDIOVASCULAR: RRR no m/r/g. Pacemaker left chest. Median sternotomy scar.


RESPIRATORY: CTAB w/o wheezes or crackles.


GASTROINTESTINAL: Abdomen soft, NT, ND.


MUSCULOSKELETAL: Extremities without clubbing, cyanosis, or edema. No calf 

tenderness. Strength 5/5 bilaterally and symmetrically.


NEUROLOGICAL: Awake and alert. CN II-XII intact. Normal speech. Extremely hard 

of hearing.


Hospital Course


CT head on admission negative for any acute findings.  Ophthalmology was 

consulted, and is cleared the patient for discharge.  Patient will continue on 

Pred forte drops.  Suspected hyphema.  Follow with ophthalmology as outpatient.





80-year-old male admitted under observation for evaluation of right eye hyphema 

and associated visual loss, and patient was found to have supratherapeutic INR 

of 3.4.





//Right eye hyphema and visual loss


CT reveals hemorrhage within the right globe


Ophthalmology consulted


Prednisolone 1% gtt R eye QID


Cyclogyl 1% gtt R eye BID


INR 3.4 in the ED; Coumadin and ASA held


Vitamin K ordered and administered in the ED, INR down to 1.7 this morning


-Patient has defibrillator and compatible with MRI.





//Diabetes mellitus


SSI per protocol


Continue home Lantus and Januvia





//Coronary artery disease


Continue digoxin


Holding aspirin and Coumadin in light of hyphema


Will again attempt to reach out to cardiologist to discuss whether or not to 

continue Coumadin 


= Continue Coumadin as per ophthalmology.





//Hyperlipidemia/hypertriglyceridemia


Continue home fenofibrate and statin





//Hypothyroidism


Continue home Synthroid





//Renal insufficiency


No baseline creatinine to compare to 


Creatinine improved





DVT prophylaxis


Holding chemical anticoagulation given supratherapeutic INR


Pt Condition on Discharge:  Good


Discharge Disposition:  Discharge Home


Discharge Time:  > 30 minutes


Discharge Instructions


DIET: Follow Instructions for:  Diabetic Diet


Activities you can perform:  Regular-No Restrictions


Follow up Referrals:  


Ophthalmology - 1 Week with Tatiana Adams MD


PCP Follow-up - 1 Week





New Medications:  


Prednisolone Acetate Opth (Prednisolone Acetate Opth) 1% Susp


1 DROP RIGHT EYE QID for hyphema for 14 Days, ML





 


Continued Medications:  


Aspirin (Aspirin) 81 Mg Chew


81 MG CHEW DAILY, TAB 0 Refills





Cholecalciferol (Vitamin D3) 1,000 Unit Cap


1000 UNITS DAILY for Nutritional Supplement, #1 BOTTLE 0 Refills





Digoxin (Digoxin) 0.25 Mg Tab


0.25 MG PO DAILY for Regulate Heart Beat, #30 TAB 0 Refills





Fenofibrate (Tricor) 48 Mg Tab


48 MG PO DAILY, #30 TAB 0 Refills


Tke with food.


Insulin Glargine Inj (Lantus Inj) 1,000 Unit/10 Ml Vial


32 UNITS SQ DAILY for Blood Sugar Management, VIAL 0 Refills





Multiple Vitamins W/ Minerals (Preservision Areds) 1 Tab


2 TAB PO DAILY for Nutritional Supplement, TAB 0 Refills





Simvastatin (Simvastatin) 40 Mg Tab


40 MG PO HS for Cholesterol Management, #30 TAB 0 Refills





Sitagliptin (Januvia) 100 Mg Tab


100 MG PO DAILY for Blood Sugar Management, #30 TAB 0 Refills





Warfarin (Warfarin) 5 Mg Tab


5 MG PO DAILY for Blood Clot Prevention, #30 TAB 0 Refills





[Thyroxine] ()  


25 MG PO DAILY

















Henri Jesus MD Mar 5, 2018 14:31
Detail Level: None
2 = A lot of assistance

## 2023-06-27 NOTE — PATIENT PROFILE ADULT - NSTRANSFERBELONGINGSDISPO_GEN_A_NUR
with patient Ear Star Wedge Flap Text: The defect edges were debeveled with a #15 blade scalpel.  Given the location of the defect and the proximity to free margins (helical rim) an ear star wedge flap was deemed most appropriate.  Using a sterile surgical marker, the appropriate flap was drawn incorporating the defect and placing the expected incisions between the helical rim and antihelix where possible.  The area thus outlined was incised through and through with a #15 scalpel blade.

## 2023-06-28 DIAGNOSIS — N18.6 END STAGE RENAL DISEASE: ICD-10-CM

## 2023-06-28 DIAGNOSIS — I95.3 HYPOTENSION OF HEMODIALYSIS: ICD-10-CM

## 2023-06-28 DIAGNOSIS — I48.92 UNSPECIFIED ATRIAL FLUTTER: ICD-10-CM

## 2023-06-28 DIAGNOSIS — I10 ESSENTIAL (PRIMARY) HYPERTENSION: ICD-10-CM

## 2023-06-28 DIAGNOSIS — N40.0 BENIGN PROSTATIC HYPERPLASIA WITHOUT LOWER URINARY TRACT SYMPTOMS: ICD-10-CM

## 2023-06-28 DIAGNOSIS — I25.10 ATHEROSCLEROTIC HEART DISEASE OF NATIVE CORONARY ARTERY WITHOUT ANGINA PECTORIS: ICD-10-CM

## 2023-06-28 DIAGNOSIS — E83.39 OTHER DISORDERS OF PHOSPHORUS METABOLISM: ICD-10-CM

## 2023-06-28 DIAGNOSIS — N39.0 URINARY TRACT INFECTION, SITE NOT SPECIFIED: ICD-10-CM

## 2023-06-28 DIAGNOSIS — I50.32 CHRONIC DIASTOLIC (CONGESTIVE) HEART FAILURE: ICD-10-CM

## 2023-06-28 DIAGNOSIS — Z29.9 ENCOUNTER FOR PROPHYLACTIC MEASURES, UNSPECIFIED: ICD-10-CM

## 2023-06-28 LAB
A1C WITH ESTIMATED AVERAGE GLUCOSE RESULT: 5.3 % — SIGNIFICANT CHANGE UP (ref 4–5.6)
ALBUMIN SERPL ELPH-MCNC: 3.2 G/DL — LOW (ref 3.3–5)
ALBUMIN SERPL ELPH-MCNC: 3.8 G/DL — SIGNIFICANT CHANGE UP (ref 3.3–5)
ALP SERPL-CCNC: 118 U/L — SIGNIFICANT CHANGE UP (ref 40–120)
ALP SERPL-CCNC: 146 U/L — HIGH (ref 40–120)
ALT FLD-CCNC: 11 U/L — SIGNIFICANT CHANGE UP (ref 4–41)
ALT FLD-CCNC: 15 U/L — SIGNIFICANT CHANGE UP (ref 4–41)
ANION GAP SERPL CALC-SCNC: 13 MMOL/L — SIGNIFICANT CHANGE UP (ref 7–14)
ANION GAP SERPL CALC-SCNC: 13 MMOL/L — SIGNIFICANT CHANGE UP (ref 7–14)
AST SERPL-CCNC: 12 U/L — SIGNIFICANT CHANGE UP (ref 4–40)
AST SERPL-CCNC: 18 U/L — SIGNIFICANT CHANGE UP (ref 4–40)
BASOPHILS # BLD AUTO: 0.04 K/UL — SIGNIFICANT CHANGE UP (ref 0–0.2)
BASOPHILS NFR BLD AUTO: 0.5 % — SIGNIFICANT CHANGE UP (ref 0–2)
BILIRUB SERPL-MCNC: 0.4 MG/DL — SIGNIFICANT CHANGE UP (ref 0.2–1.2)
BILIRUB SERPL-MCNC: 0.4 MG/DL — SIGNIFICANT CHANGE UP (ref 0.2–1.2)
BUN SERPL-MCNC: 26 MG/DL — HIGH (ref 7–23)
BUN SERPL-MCNC: 29 MG/DL — HIGH (ref 7–23)
CALCIUM SERPL-MCNC: 9.1 MG/DL — SIGNIFICANT CHANGE UP (ref 8.4–10.5)
CALCIUM SERPL-MCNC: 9.6 MG/DL — SIGNIFICANT CHANGE UP (ref 8.4–10.5)
CHLORIDE SERPL-SCNC: 97 MMOL/L — LOW (ref 98–107)
CHLORIDE SERPL-SCNC: 99 MMOL/L — SIGNIFICANT CHANGE UP (ref 98–107)
CHOLEST SERPL-MCNC: 101 MG/DL — SIGNIFICANT CHANGE UP
CO2 SERPL-SCNC: 25 MMOL/L — SIGNIFICANT CHANGE UP (ref 22–31)
CO2 SERPL-SCNC: 26 MMOL/L — SIGNIFICANT CHANGE UP (ref 22–31)
CREAT SERPL-MCNC: 2.57 MG/DL — HIGH (ref 0.5–1.3)
CREAT SERPL-MCNC: 2.68 MG/DL — HIGH (ref 0.5–1.3)
DIALYSIS INSTRUMENT RESULT - HEPATITIS B SURFACE ANTIGEN: NEGATIVE — SIGNIFICANT CHANGE UP
EGFR: 24 ML/MIN/1.73M2 — LOW
EGFR: 25 ML/MIN/1.73M2 — LOW
EOSINOPHIL # BLD AUTO: 0.65 K/UL — HIGH (ref 0–0.5)
EOSINOPHIL NFR BLD AUTO: 8.2 % — HIGH (ref 0–6)
ESTIMATED AVERAGE GLUCOSE: 105 — SIGNIFICANT CHANGE UP
GLUCOSE SERPL-MCNC: 100 MG/DL — HIGH (ref 70–99)
GLUCOSE SERPL-MCNC: 106 MG/DL — HIGH (ref 70–99)
HCT VFR BLD CALC: 30.6 % — LOW (ref 39–50)
HDLC SERPL-MCNC: 39 MG/DL — LOW
HGB BLD-MCNC: 9.4 G/DL — LOW (ref 13–17)
IANC: 4.74 K/UL — SIGNIFICANT CHANGE UP (ref 1.8–7.4)
IMM GRANULOCYTES NFR BLD AUTO: 0.3 % — SIGNIFICANT CHANGE UP (ref 0–0.9)
LIPID PNL WITH DIRECT LDL SERPL: 49 MG/DL — SIGNIFICANT CHANGE UP
LYMPHOCYTES # BLD AUTO: 1.68 K/UL — SIGNIFICANT CHANGE UP (ref 1–3.3)
LYMPHOCYTES # BLD AUTO: 21.1 % — SIGNIFICANT CHANGE UP (ref 13–44)
MAGNESIUM SERPL-MCNC: 2.3 MG/DL — SIGNIFICANT CHANGE UP (ref 1.6–2.6)
MAGNESIUM SERPL-MCNC: 2.7 MG/DL — HIGH (ref 1.6–2.6)
MCHC RBC-ENTMCNC: 30.7 GM/DL — LOW (ref 32–36)
MCHC RBC-ENTMCNC: 31.2 PG — SIGNIFICANT CHANGE UP (ref 27–34)
MCV RBC AUTO: 101.7 FL — HIGH (ref 80–100)
MONOCYTES # BLD AUTO: 0.83 K/UL — SIGNIFICANT CHANGE UP (ref 0–0.9)
MONOCYTES NFR BLD AUTO: 10.4 % — SIGNIFICANT CHANGE UP (ref 2–14)
MRSA PCR RESULT.: DETECTED
NEUTROPHILS # BLD AUTO: 4.74 K/UL — SIGNIFICANT CHANGE UP (ref 1.8–7.4)
NEUTROPHILS NFR BLD AUTO: 59.5 % — SIGNIFICANT CHANGE UP (ref 43–77)
NON HDL CHOLESTEROL: 62 MG/DL — SIGNIFICANT CHANGE UP
NRBC # BLD: 0 /100 WBCS — SIGNIFICANT CHANGE UP (ref 0–0)
NRBC # FLD: 0 K/UL — SIGNIFICANT CHANGE UP (ref 0–0)
NT-PROBNP SERPL-SCNC: 3529 PG/ML — HIGH
PHOSPHATE SERPL-MCNC: 3 MG/DL — SIGNIFICANT CHANGE UP (ref 2.5–4.5)
PHOSPHATE SERPL-MCNC: 3.3 MG/DL — SIGNIFICANT CHANGE UP (ref 2.5–4.5)
PLATELET # BLD AUTO: 287 K/UL — SIGNIFICANT CHANGE UP (ref 150–400)
POTASSIUM SERPL-MCNC: 3.8 MMOL/L — SIGNIFICANT CHANGE UP (ref 3.5–5.3)
POTASSIUM SERPL-MCNC: 3.9 MMOL/L — SIGNIFICANT CHANGE UP (ref 3.5–5.3)
POTASSIUM SERPL-SCNC: 3.8 MMOL/L — SIGNIFICANT CHANGE UP (ref 3.5–5.3)
POTASSIUM SERPL-SCNC: 3.9 MMOL/L — SIGNIFICANT CHANGE UP (ref 3.5–5.3)
PROT SERPL-MCNC: 6.9 G/DL — SIGNIFICANT CHANGE UP (ref 6–8.3)
PROT SERPL-MCNC: 8.2 G/DL — SIGNIFICANT CHANGE UP (ref 6–8.3)
RBC # BLD: 3.01 M/UL — LOW (ref 4.2–5.8)
RBC # FLD: 14.3 % — SIGNIFICANT CHANGE UP (ref 10.3–14.5)
S AUREUS DNA NOSE QL NAA+PROBE: DETECTED
SODIUM SERPL-SCNC: 136 MMOL/L — SIGNIFICANT CHANGE UP (ref 135–145)
SODIUM SERPL-SCNC: 137 MMOL/L — SIGNIFICANT CHANGE UP (ref 135–145)
TRIGL SERPL-MCNC: 64 MG/DL — SIGNIFICANT CHANGE UP
WBC # BLD: 7.96 K/UL — SIGNIFICANT CHANGE UP (ref 3.8–10.5)
WBC # FLD AUTO: 7.96 K/UL — SIGNIFICANT CHANGE UP (ref 3.8–10.5)

## 2023-06-28 PROCEDURE — 99222 1ST HOSP IP/OBS MODERATE 55: CPT

## 2023-06-28 PROCEDURE — 99223 1ST HOSP IP/OBS HIGH 75: CPT | Mod: GC

## 2023-06-28 PROCEDURE — 93010 ELECTROCARDIOGRAM REPORT: CPT

## 2023-06-28 PROCEDURE — 99223 1ST HOSP IP/OBS HIGH 75: CPT

## 2023-06-28 RX ORDER — CALCIUM ACETATE 667 MG
667 TABLET ORAL
Refills: 0 | Status: DISCONTINUED | OUTPATIENT
Start: 2023-06-28 | End: 2023-07-05

## 2023-06-28 RX ORDER — SENNA PLUS 8.6 MG/1
1 TABLET ORAL
Refills: 0 | DISCHARGE

## 2023-06-28 RX ORDER — SENNA PLUS 8.6 MG/1
2 TABLET ORAL AT BEDTIME
Refills: 0 | Status: DISCONTINUED | OUTPATIENT
Start: 2023-06-28 | End: 2023-07-06

## 2023-06-28 RX ORDER — MUPIROCIN 20 MG/G
1 OINTMENT TOPICAL
Refills: 0 | Status: COMPLETED | OUTPATIENT
Start: 2023-06-28 | End: 2023-07-03

## 2023-06-28 RX ORDER — TAMSULOSIN HYDROCHLORIDE 0.4 MG/1
1 CAPSULE ORAL
Refills: 0 | DISCHARGE

## 2023-06-28 RX ORDER — FERROUS SULFATE 325(65) MG
325 TABLET ORAL DAILY
Refills: 0 | Status: DISCONTINUED | OUTPATIENT
Start: 2023-06-28 | End: 2023-07-06

## 2023-06-28 RX ORDER — ATORVASTATIN CALCIUM 80 MG/1
1 TABLET, FILM COATED ORAL
Qty: 0 | Refills: 0 | DISCHARGE

## 2023-06-28 RX ORDER — METOPROLOL TARTRATE 50 MG
25 TABLET ORAL DAILY
Refills: 0 | Status: DISCONTINUED | OUTPATIENT
Start: 2023-06-28 | End: 2023-06-30

## 2023-06-28 RX ORDER — DOCUSATE SODIUM 100 MG
1 CAPSULE ORAL
Refills: 0 | DISCHARGE

## 2023-06-28 RX ORDER — PANTOPRAZOLE SODIUM 20 MG/1
40 TABLET, DELAYED RELEASE ORAL
Refills: 0 | Status: DISCONTINUED | OUTPATIENT
Start: 2023-06-28 | End: 2023-07-06

## 2023-06-28 RX ORDER — PANTOPRAZOLE SODIUM 20 MG/1
1 TABLET, DELAYED RELEASE ORAL
Refills: 0 | DISCHARGE

## 2023-06-28 RX ORDER — ASPIRIN/CALCIUM CARB/MAGNESIUM 324 MG
81 TABLET ORAL DAILY
Refills: 0 | Status: DISCONTINUED | OUTPATIENT
Start: 2023-06-28 | End: 2023-07-06

## 2023-06-28 RX ORDER — IPRATROPIUM/ALBUTEROL SULFATE 18-103MCG
3 AEROSOL WITH ADAPTER (GRAM) INHALATION EVERY 6 HOURS
Refills: 0 | Status: DISCONTINUED | OUTPATIENT
Start: 2023-06-28 | End: 2023-07-06

## 2023-06-28 RX ORDER — TAMSULOSIN HYDROCHLORIDE 0.4 MG/1
0.4 CAPSULE ORAL AT BEDTIME
Refills: 0 | Status: DISCONTINUED | OUTPATIENT
Start: 2023-06-28 | End: 2023-07-06

## 2023-06-28 RX ORDER — ATORVASTATIN CALCIUM 80 MG/1
40 TABLET, FILM COATED ORAL AT BEDTIME
Refills: 0 | Status: DISCONTINUED | OUTPATIENT
Start: 2023-06-28 | End: 2023-07-06

## 2023-06-28 RX ORDER — SODIUM CHLORIDE 9 MG/ML
100 INJECTION INTRAMUSCULAR; INTRAVENOUS; SUBCUTANEOUS
Refills: 0 | Status: DISCONTINUED | OUTPATIENT
Start: 2023-06-28 | End: 2023-07-06

## 2023-06-28 RX ORDER — MUPIROCIN 20 MG/G
1 OINTMENT TOPICAL
Refills: 0 | Status: DISCONTINUED | OUTPATIENT
Start: 2023-06-28 | End: 2023-06-28

## 2023-06-28 RX ORDER — MIDODRINE HYDROCHLORIDE 2.5 MG/1
10 TABLET ORAL
Refills: 0 | Status: DISCONTINUED | OUTPATIENT
Start: 2023-06-28 | End: 2023-07-06

## 2023-06-28 RX ORDER — IPRATROPIUM/ALBUTEROL SULFATE 18-103MCG
3 AEROSOL WITH ADAPTER (GRAM) INHALATION
Refills: 0 | DISCHARGE

## 2023-06-28 RX ORDER — CHLORHEXIDINE GLUCONATE 213 G/1000ML
1 SOLUTION TOPICAL
Refills: 0 | Status: DISCONTINUED | OUTPATIENT
Start: 2023-06-28 | End: 2023-07-06

## 2023-06-28 RX ORDER — APIXABAN 2.5 MG/1
1 TABLET, FILM COATED ORAL
Refills: 0 | DISCHARGE

## 2023-06-28 RX ORDER — FERROUS SULFATE 325(65) MG
1 TABLET ORAL
Refills: 0 | DISCHARGE

## 2023-06-28 RX ORDER — ASPIRIN/CALCIUM CARB/MAGNESIUM 324 MG
1 TABLET ORAL
Qty: 0 | Refills: 0 | DISCHARGE

## 2023-06-28 RX ORDER — APIXABAN 2.5 MG/1
5 TABLET, FILM COATED ORAL EVERY 12 HOURS
Refills: 0 | Status: DISCONTINUED | OUTPATIENT
Start: 2023-06-28 | End: 2023-06-29

## 2023-06-28 RX ADMIN — Medication 25 MILLIGRAM(S): at 05:01

## 2023-06-28 RX ADMIN — Medication 667 MILLIGRAM(S): at 11:19

## 2023-06-28 RX ADMIN — Medication 3 MILLILITER(S): at 22:07

## 2023-06-28 RX ADMIN — Medication 1 TABLET(S): at 11:18

## 2023-06-28 RX ADMIN — CHLORHEXIDINE GLUCONATE 1 APPLICATION(S): 213 SOLUTION TOPICAL at 05:01

## 2023-06-28 RX ADMIN — ATORVASTATIN CALCIUM 40 MILLIGRAM(S): 80 TABLET, FILM COATED ORAL at 21:14

## 2023-06-28 RX ADMIN — Medication 667 MILLIGRAM(S): at 08:46

## 2023-06-28 RX ADMIN — Medication 3 MILLILITER(S): at 09:44

## 2023-06-28 RX ADMIN — MUPIROCIN 1 APPLICATION(S): 20 OINTMENT TOPICAL at 21:13

## 2023-06-28 RX ADMIN — Medication 3 MILLILITER(S): at 14:49

## 2023-06-28 RX ADMIN — PANTOPRAZOLE SODIUM 40 MILLIGRAM(S): 20 TABLET, DELAYED RELEASE ORAL at 05:01

## 2023-06-28 RX ADMIN — Medication 81 MILLIGRAM(S): at 11:18

## 2023-06-28 RX ADMIN — TAMSULOSIN HYDROCHLORIDE 0.4 MILLIGRAM(S): 0.4 CAPSULE ORAL at 21:14

## 2023-06-28 RX ADMIN — Medication 325 MILLIGRAM(S): at 11:18

## 2023-06-28 RX ADMIN — Medication 667 MILLIGRAM(S): at 21:13

## 2023-06-28 RX ADMIN — MIDODRINE HYDROCHLORIDE 10 MILLIGRAM(S): 2.5 TABLET ORAL at 14:42

## 2023-06-28 RX ADMIN — SENNA PLUS 2 TABLET(S): 8.6 TABLET ORAL at 21:13

## 2023-06-28 RX ADMIN — APIXABAN 5 MILLIGRAM(S): 2.5 TABLET, FILM COATED ORAL at 21:13

## 2023-06-28 RX ADMIN — Medication 3 MILLILITER(S): at 04:45

## 2023-06-28 NOTE — H&P ADULT - ATTENDING COMMENTS
76 yo M w/ HFpEF (EF 50% 5/2023), CAD s/p NSTEMI 2/2 cocaine use (2019), pAfib/Aflutter (on Eliquis), ESRD on HD (MWF via LUE AVF), HTN, BPH, recurrent UTI transferred from [facility] for PPM placement with EP, currently asymptomatic and HDS. EP consult to be called in AM to arrange for PPM placement. Hold Eliquis for now. C/w metoprolol succinate 25 mg daily. Pt clinically euvolemic at this time, not in acute decompensated heart failure. Monitor volume status and diurese PRN. Nephrology consult also pending to c/w HD during admission, no indication for urgent HD at this time.

## 2023-06-28 NOTE — PHYSICAL THERAPY INITIAL EVALUATION ADULT - ACTIVE RANGE OF MOTION EXAMINATION, REHAB EVAL
except pt. with limited active ROM right shoulder, pt. unable to lift arm. Pt. reports this was from a fall prior to initial hospitalization. Pt. states x-rays were taken at [facility] and there were no fractures./bilateral upper extremity Active ROM was WFL (within functional limits)/bilateral  lower extremity Active ROM was WFL (within functional limits)

## 2023-06-28 NOTE — H&P ADULT - NSHPPHYSICALEXAM_GEN_ALL_CORE
VITALS:   Vital Signs Last 24 Hrs  T(C): 36.6 (27 Jun 2023 18:55), Max: 36.6 (27 Jun 2023 18:55)  T(F): 97.9 (27 Jun 2023 18:55), Max: 97.9 (27 Jun 2023 18:55)  HR: 90 (27 Jun 2023 18:55) (90 - 90)  BP: 121/70 (27 Jun 2023 18:55) (121/70 - 121/70)  BP(mean): --  RR: 18 (27 Jun 2023 18:55) (18 - 18)  SpO2: 99% (27 Jun 2023 18:55) (99% - 99%)    Parameters below as of 27 Jun 2023 18:55  Patient On (Oxygen Delivery Method): room air    GENERAL: NAD, lying in bed comfortably  HEAD:  Atraumatic, normocephalic  EYES: EOMI, PERRLA, conjunctiva and sclera clear  ENT: Moist mucous membranes  NECK: Supple, no JVD  HEART: S1, S2, regular rate and rhythm, no murmurs, rubs, or gallops  LUNGS: Unlabored respirations.  Clear to auscultation bilaterally, no crackles, wheezing, or rhonchi  ABDOMEN: Soft, nontender, nondistended, +BS  EXTREMITIES: 2+ peripheral pulses bilaterally. No clubbing, cyanosis, or edema  NERVOUS SYSTEM:  A&Ox3, no focal deficits   SKIN: No rashes or lesions VITALS:   Vital Signs Last 24 Hrs  T(C): 36.6 (27 Jun 2023 18:55), Max: 36.6 (27 Jun 2023 18:55)  T(F): 97.9 (27 Jun 2023 18:55), Max: 97.9 (27 Jun 2023 18:55)  HR: 90 (27 Jun 2023 18:55) (90 - 90)  BP: 121/70 (27 Jun 2023 18:55) (121/70 - 121/70)  BP(mean): --  RR: 18 (27 Jun 2023 18:55) (18 - 18)  SpO2: 99% (27 Jun 2023 18:55) (99% - 99%)    Parameters below as of 27 Jun 2023 18:55  Patient On (Oxygen Delivery Method): room air    GENERAL: NAD, lying in bed comfortably  HEAD:  Atraumatic, normocephalic  EYES: EOMI, PERRLA, conjunctiva and sclera clear  ENT: Moist mucous membranes  NECK: Supple, no JVD  HEART: S1, S2, irregular rate and rhythm, no murmurs, rubs, or gallops  CHEST/LUNGS: Unlabored respirations.  Clear to auscultation bilaterally, no crackles, wheezing, or rhonchi. +R chest Permacath site c/d/i  ABDOMEN: Soft, nontender, nondistended, +BS  EXTREMITIES: 2+ peripheral pulses bilaterally. No clubbing, cyanosis, or edema. +LUE AVF w/ palpable thrill  NERVOUS SYSTEM:  A&Ox3, no focal deficits   SKIN: No rashes or lesions Vital Signs Last 24 Hrs  T(C): 36.6 (27 Jun 2023 18:55), Max: 36.6 (27 Jun 2023 18:55)  T(F): 97.9 (27 Jun 2023 18:55), Max: 97.9 (27 Jun 2023 18:55)  HR: 90 (27 Jun 2023 18:55) (90 - 90)  BP: 121/70 (27 Jun 2023 18:55) (121/70 - 121/70)  BP(mean): --  RR: 18 (27 Jun 2023 18:55) (18 - 18)  SpO2: 99% (27 Jun 2023 18:55) (99% - 99%)    Parameters below as of 27 Jun 2023 18:55  Patient On (Oxygen Delivery Method): room air    Vital Signs Last 24 Hrs  T(C): 36.7 (28 Jun 2023 05:00), Max: 36.7 (28 Jun 2023 03:00)  T(F): 98 (28 Jun 2023 05:00), Max: 98 (28 Jun 2023 03:00)  HR: 94 (28 Jun 2023 05:00) (90 - 94)  BP: 112/94 (28 Jun 2023 05:00) (112/94 - 121/70)  BP(mean): --  RR: 18 (28 Jun 2023 05:00) (18 - 18)  SpO2: 99% (28 Jun 2023 05:00) (99% - 99%)    PHYSICAL EXAM:  General: Awake and alert.  No acute distress.  Head: Normocephalic, atraumatic.    Eyes: PERRL.  EOMI.  No scleral icterus.  No conjunctival pallor.  Mouth: Moist MM.  No oropharyngeal exudates.    Neck: Supple.  Full range of motion.  No JVD.  No LAD.  No thyromegaly.  Trachea midline.    Heart: RRR.  Normal S1 and S2.  No murmurs, rubs, or gallops.  No LE edema b/l.   Lungs: Nonlabored breathing.  Good inspiratory effort.  CTAB.  No wheezes, crackles, or rhonchi.    Abdomen: BS+, soft, nontender with no rebound or guarding, nondistended.  No hepatomegaly.   Skin: R chest wall with permacath, c/d/i, no surrounding induration/fluctuance.  Warm and dry.  No rashes.  Extremities: No cyanosis.  2+ peripheral pulses b/l.  LUE AVF with palpable thrill and audible bruit.  Musculoskeletal: No joint deformities.  No spinal or paraspinal tenderness.  Neuro: A&Ox3.  CN II-XII intact.  5/5 motor strength in UE and LE b/l.  Tactile sensation intact in UE and LE b/l.  No focal deficits.

## 2023-06-28 NOTE — H&P ADULT - PROBLEM SELECTOR PLAN 6
Hx of BPH w/ frequently UTIs and urosepsis. On tamsulosin 0.4mg qhs at home  - pt. currently denies any urinary symptoms including hesitancy, incontinence, dysuria, frequency, urgency    Plan:  - c/w tamsulosin 0.4mg qhs

## 2023-06-28 NOTE — H&P ADULT - PROBLEM SELECTOR PLAN 5
Hx of HTN. Was on midodrine 10mg TID at [facility] for unclear reasons  - BP on admission 120/70    Plan:  - hold midodrine 10mg TID for now given intermittent bradycardia episodes and stable BPs  - monitor BPs

## 2023-06-28 NOTE — H&P ADULT - PROBLEM SELECTOR PLAN 8
DVT PPx: holding Eliquis 5mg BID for now pending possible PPM placement  Diet: renal  Dispo: pending clinical improvement  Code Status: full code

## 2023-06-28 NOTE — H&P ADULT - NSICDXPASTMEDICALHX_GEN_ALL_CORE_FT
PAST MEDICAL HISTORY:  Atrial fibrillation and flutter     BPH (benign prostatic hyperplasia)     CAD (coronary artery disease)     ESRD on dialysis     HTN (hypertension)     Recurrent UTI

## 2023-06-28 NOTE — H&P ADULT - NSHPSOCIALHISTORY_GEN_ALL_CORE
Lives with his daughter and 2 grandchildren. , has 3 children. Has a cane but does not use it to ambulate. Retired [employer] worker.

## 2023-06-28 NOTE — H&P ADULT - PROBLEM SELECTOR PLAN 2
Hx of HFpEF (EF 50% 5/2023), s/p IV Lasix 40mg qd and standing duonebs at [facility] but not discharged on diuretics  - TTE ([facility], 5/2023): EF 50%, no WMA, mildly increased LV wall thickness, dec RVSF, RV volume overload, mod dilated RA, mild AS, mild MS, mod-severe TR  - proBNP 3500 (though unreliable given ESRD)  - pt. appears clinically euvolemic at this time    Plan:  - c/w metoprolol succinate 25mg qd  - c/w Duonebs q6h  - monitor daily standing weights, strict I/Os  - monitor electrolytes and replete for K>4, Mg>2 Hx of HFpEF (EF 50% 5/2023), s/p IV Lasix 40mg qd and standing duonebs at [facility] but not discharged on diuretics  - TTE ([facility], 5/2023): EF 50%, no WMA, mildly increased LV wall thickness, decreased RVSF, RV volume overload, mod dilated RA, mild AS, mild MS, mod-severe TR  - proBNP 3500 (though unreliable given ESRD)  - pt. appears clinically euvolemic at this time    Plan:  - c/w metoprolol succinate 25mg qd  - c/w Duonebs q6h  - pt appears to have been started on IV Lasix at [facility], unsure if pt was still on IV Lasix on discharge, will hold diuretics for now as pt appears clinically euvolemic and resume Lasix PRN  - monitor daily standing weights, strict I/Os  - monitor electrolytes and replete for K>4, Mg>2

## 2023-06-28 NOTE — H&P ADULT - PROBLEM SELECTOR PLAN 1
Pt. w/ hx of pAF and aflutter, transferred from [facility] for PPM placement for tachy-shy syndrome, possibly 2/2 ADHF vs. primary underlying arrythmia. Currently on metoprolol succinate 25mg qd for rate control and Eliquis 5mg BID for AC  - initial EKG at [facility] showing rate-controlled aflutter w/ frequent ectopy; subsequent EKG showing 2:1 aflutter w/ PVCs  - EKG here showing sinus rhythm w/ frequent PVCs, no acute ST/T-wave changes, HR 98, QTc 454ms  - tele: afib w/ RVR vs. sinus rhythm w/ frequent ectopy  - [id number] score 3    Plan:  - EP consult in AM for PPM placement  - c/w metoprolol succinate 25mg qd for rate control  - hold Eliquis for now pending PPM placement as above  - monitor on telemetry and vital signs q4h  - monitor electrolytes and replete for K>4, Mg>2 Pt. w/ hx of pAF and aflutter, transferred from [facility] for PPM placement for tachy-shy syndrome, possibly 2/2 ADHF vs. primary underlying arrythmia. Currently on metoprolol succinate 25mg qd for rate control and Eliquis 5mg BID for AC  - initial EKG at [facility] showing rate-controlled aflutter w/ frequent ectopy; subsequent EKG showing 2:1 aflutter w/ PVCs  - EKG here showing sinus rhythm w/ frequent PVCs, no acute ST/T-wave changes, HR 98, QTc 454ms  - tele: afib w/ RVR vs. sinus rhythm w/ frequent ectopy  - [id number] score 3    Plan:  - EP consult to be called in AM for PPM placement  - c/w metoprolol succinate 25mg qd for rate control  - hold Eliquis for now pending PPM placement as above  - monitor on telemetry and vital signs q4h  - monitor electrolytes and replete for K>4, Mg>2

## 2023-06-28 NOTE — H&P ADULT - NSHPLABSRESULTS_GEN_ALL_CORE
EKG: personally reviewed-    LABS: personally reviewed                        11.2   8.78  )-----------( 321      ( 27 Jun 2023 23:16 )             35.5     06-27    136  |  97<L>  |  26<H>  ----------------------------<  106<H>  3.9   |  26  |  2.57<H>    Ca    9.6      27 Jun 2023 23:16  Phos  3.0     06-27  Mg     2.30     06-27    TPro  8.2  /  Alb  3.8  /  TBili  0.4  /  DBili  x   /  AST  18  /  ALT  15  /  AlkPhos  146<H>  06-27    PT/INR - ( 27 Jun 2023 23:16 )   PT: 20.1 sec;   INR: 1.72 ratio    PTT - ( 27 Jun 2023 23:16 )  PTT:38.4 sec    Urinalysis Basic - ( 27 Jun 2023 23:16 )    Color: x / Appearance: x / SG: x / pH: x  Gluc: 106 mg/dL / Ketone: x  / Bili: x / Urobili: x   Blood: x / Protein: x / Nitrite: x   Leuk Esterase: x / RBC: x / WBC x   Sq Epi: x / Non Sq Epi: x / Bacteria: x    IMAGING: [facility] records personally reviewed- EKG: personally reviewed- sinus rhythm w/ frequent PVCs, no acute ST/T-wave changes, HR 98, QTc 454ms. Appears largely similar compared to prior ECGs    LABS: personally reviewed                        11.2   8.78  )-----------( 321      ( 27 Jun 2023 23:16 )             35.5     06-27    136  |  97<L>  |  26<H>  ----------------------------<  106<H>  3.9   |  26  |  2.57<H>    Ca    9.6      27 Jun 2023 23:16  Phos  3.0     06-27  Mg     2.30     06-27    TPro  8.2  /  Alb  3.8  /  TBili  0.4  /  DBili  x   /  AST  18  /  ALT  15  /  AlkPhos  146<H>  06-27    PT/INR - ( 27 Jun 2023 23:16 )   PT: 20.1 sec;   INR: 1.72 ratio    PTT - ( 27 Jun 2023 23:16 )  PTT:38.4 sec    Pro-Brain Natriuretic Peptide (06.27.23 @ 23:16)    Pro-Brain Natriuretic Peptide: 3529    Urinalysis Basic - ( 27 Jun 2023 23:16 )    Color: x / Appearance: x / SG: x / pH: x  Gluc: 106 mg/dL / Ketone: x  / Bili: x / Urobili: x   Blood: x / Protein: x / Nitrite: x   Leuk Esterase: x / RBC: x / WBC x   Sq Epi: x / Non Sq Epi: x / Bacteria: x    IMAGING: [facility] records personally reviewed  CXR- no acute pulmonary pathology  TTE (5/2023): EF 50%, no WMA, mildly increased LV wall thickness, dec RVSF, RV volume overload, mod dilated RA, mild AS, mild MS, mod-severe TR EKG: personally reviewed- sinus rhythm w/ frequent PVCs, no acute ST/T-wave changes, HR 98, QTc 454ms. Appears largely similar compared to prior ECGs    LABS: personally reviewed                        11.2   8.78  )-----------( 321      ( 27 Jun 2023 23:16 )             35.5     06-27    136  |  97<L>  |  26<H>  ----------------------------<  106<H>  3.9   |  26  |  2.57<H>    Ca    9.6      27 Jun 2023 23:16  Phos  3.0     06-27  Mg     2.30     06-27    TPro  8.2  /  Alb  3.8  /  TBili  0.4  /  DBili  x   /  AST  18  /  ALT  15  /  AlkPhos  146<H>  06-27    PT/INR - ( 27 Jun 2023 23:16 )   PT: 20.1 sec;   INR: 1.72 ratio    PTT - ( 27 Jun 2023 23:16 )  PTT:38.4 sec    Pro-Brain Natriuretic Peptide (06.27.23 @ 23:16)    Pro-Brain Natriuretic Peptide: 3529    Urinalysis Basic - ( 27 Jun 2023 23:16 )    Color: x / Appearance: x / SG: x / pH: x  Gluc: 106 mg/dL / Ketone: x  / Bili: x / Urobili: x   Blood: x / Protein: x / Nitrite: x   Leuk Esterase: x / RBC: x / WBC x   Sq Epi: x / Non Sq Epi: x / Bacteria: x    IMAGING: [facility] records personally reviewed  CXR- no acute pulmonary pathology  TTE (5/2023): EF 50%, no WMA, mildly increased LV wall thickness, decreased RVSF, RV volume overload, mod dilated RA, mild AS, mild MS, mod-severe TR

## 2023-06-28 NOTE — H&P ADULT - PROBLEM SELECTOR PLAN 4
Hx of CAD s/p NSTEMI in 2019 i/s/o cocaine use. On ASA 81mg and atorvastatin 40mg qhs at home  - L/RHC (11/2019): myocardial bridging in mid-LAD, pCx 30%, pRCA 100% )  - pt. currently denies chest pain, SOB, palpitations  - EKG w/o ischemic changes    Plan:  - c/w ASA 81mg qd  - c/w atorvastatin 40mg qhs  - monitor for chest pain or signs of ischemia

## 2023-06-28 NOTE — PHYSICAL THERAPY INITIAL EVALUATION ADULT - ADDITIONAL COMMENTS
Pt. owns a straight cane, rolling walker, wheelchair, and shower chair. Pt. reports he used assistive devices as needed. Pt. has HHA 4 days/week for 5 hours/day. Pt. reports he has not been home for 2 months. Went to [facility], then rehab, then back to [facility], and now transferred to [facility].     Pt. was left in bed post PT Evaluation, no apparent distress, all lines intact, HR 97 bpm. Pedro Luis LUIS made aware of pt. status and participation in PT.

## 2023-06-28 NOTE — H&P ADULT - ASSESSMENT
77M w/ HFpEF (EF 50% 5/2023), CAD s/p NSTEMI 2/2 cocaine use (2019), pAF/aflutter (on Eliquis), ESRD on HD (MWF via LUE AVF), HTN, BPH, recurrent UTI transferred from [facility] for PPM placement with EP, currently asymptomatic and HDS.

## 2023-06-28 NOTE — CONSULT NOTE ADULT - ASSESSMENT
This is a 77 year old man with a pmh of HFpEF (EF 50% 5/2023), CAD s/p NSTEMI 2/2 cocaine use (2019), pAF/aflutter (on Eliquis), ESRD on HD (MWF via LUE AVF), HTN, BPH, and recurrent UTI who was presented to [facility] for SOB on 6/19/2023 and found to have Aflutter with tacy-bradycardia syndrome and monomorphic VT to 180s-200s that lasted 1 minute per documentation. Now transferred to [facility] for Aflutter ablation with possible PPM placement    Plan:  - Continuous telemetric monitoring  - Monitor electrolytes and replete K to 4 and Mg to 2  - TTE ([facility], 5/2023): EF 50%, no WMA, mildly increased LV wall thickness, dec RVSF, RV volume overload, mod dilated RA, mild AS, mild MS, mod-severe TR  - Continue metoprolol 25mg qd   - Continue Eliquis 5mg po BID for thromboembolic ppx  given that patient has a [id number] score of 3  - Tentatively plan for Aflutter ablation with possible PPM with family is agreeable on 6/30/2023  - Continue care per primary team    Joann Santoro pa-c  Patient to be staffed with attending. Please await attending addendum This is a 77 year old man with a pmh of HFpEF (EF 50% 5/2023), CAD s/p NSTEMI 2/2 cocaine use (2019), pAF/aflutter (on Eliquis), ESRD on HD (MWF via LUE AVF), HTN, BPH, and recurrent UTI who was presented to [facility] for SOB on 6/19/2023 and found to have Aflutter with tacy-bradycardia syndrome and monomorphic VT to 180s-200s that lasted 1 minute per documentation. Now transferred to [facility] for Aflutter ablation with possible PPM placement    Plan:  - Continuous telemetric monitoring  - Monitor electrolytes and replete K to 4 and Mg to 2  - TTE ([facility], 5/2023): EF 50%, no WMA, mildly increased LV wall thickness, dec RVSF, RV volume overload, mod dilated RA, mild AS, mild MS, mod-severe TR  - Continue metoprolol 25mg qd   - Continue Eliquis 5mg po BID for thromboembolic ppx  given that patient has a [id number] score of 3  - Tentatively plan for Aflutter ablation with possible PPM with family is agreeable on 6/30/2023  - Obtain 2 type and screen with ABO confirmation   - Continue care per primary team    Joann Santoro pa-c  Patient to be staffed with attending. Please await attending addendum This is a 77 year old man with a pmh of HFpEF (EF 50% 5/2023), CAD s/p NSTEMI 2/2 cocaine use (2019), pAF/aflutter (on Eliquis), ESRD on HD (MWF via LUE AVF), HTN, BPH, and recurrent UTI who was presented to [facility] for SOB on 6/19/2023 and found to have Aflutter with tacy-bradycardia syndrome and monomorphic VT to 180s-200s that lasted 1 minute per documentation. Now transferred to [facility] for Aflutter ablation with possible PPM placement. TTE ([facility], 5/2023): EF 50%, no WMA, mildly increased LV wall thickness, dec RVSF, RV volume overload, mod dilated RA, mild AS, mild MS, mod-severe TR. Continue metoprolol 25mg qd. Continue Eliquis 5mg po BID for thromboembolic ppx  given that patient has a [id number] score of 3. Tentatively plan for Aflutter ablation with possible PPM with family is agreeable on 6/30/2023

## 2023-06-28 NOTE — CONSULT NOTE ADULT - PROBLEM SELECTOR RECOMMENDATION 2
Patient reports that he often gets low blood pressure with dialysis.  Was on midodrine at [facility].  Plan will be to do midodrine 10 mg prior to HD.  Low dialysate temp, sodium modelling.

## 2023-06-28 NOTE — H&P ADULT - NSHPREVIEWOFSYSTEMS_GEN_ALL_CORE
CONSTITUTIONAL: No weakness, fevers or chills  EYES/ENT: No visual changes;  No vertigo or throat pain   NECK: No pain or stiffness  RESPIRATORY: No cough, wheezing, hemoptysis; No shortness of breath  CARDIOVASCULAR: No chest pain or palpitations. No orthopnea, paroxysmal nocturnal dysuria, or lower extremity edema  GASTROINTESTINAL: No abdominal or epigastric pain. No nausea, vomiting, or hematemesis; No diarrhea or constipation. No melena or hematochezia.  GENITOURINARY: No dysuria, frequency or hematuria  NEUROLOGICAL: No numbness, weakness, tingling. No headache, no visual changes  SKIN: No itching, rashes  [x] All other systems negative  [ ] Unable to assess ROS because ________ Constitutional: No generalized weakness, fevers, chills, or weight loss  Eyes: No visual changes, double vision, or eye pain  Ears, Nose, Mouth, Throat: No runny nose, sinus pain, ear pain, tinnitus, sore throat, dysphagia, or odynophagia  Cardiovascular: No chest pain, palpitations, or LE edema  Respiratory: +ZAMUDIO and orthopnea (resolved). No cough, wheezing, hemoptysis, or shortness of breath at rest.  Gastrointestinal: No abdominal pain, dysphagia, anorexia, nausea/vomiting, diarrhea/constipation, hematemesis, melena, or BRBPR  Genitourinary: No dysuria, frequency, urgency, or hematuria  Musculoskeletal: No neck pain or back pain. No joint pain, swelling, or decreased ROM.  Skin: No pruritus, rashes, lesions, or wounds  Neurologic: No syncope, seizures, headache, paresthesias, numbness, or limb weakness  Psychiatric: No depression, anxiety, difficulty concentrating, anhedonia, or lack of energy  Endocrine: No heat/cold intolerance, mood swings, sweats, polydipsia, or polyuria  Hematologic/lymphatic: No purpura, petechia, or prolonged or excessive bleeding after dental extraction / injury  Allergic/Immunologic: No anaphylaxis or allergic response to materials, foods, animals    Positives and pertinent negatives noted and all other systems negative.

## 2023-06-28 NOTE — PHYSICAL THERAPY INITIAL EVALUATION ADULT - LEVEL OF INDEPENDENCE: SIT/STAND, REHAB EVAL
To be assessed, OOB held at this time. Pt. with plan for PPM placement and reported feeling tired while seated at edge of bed.

## 2023-06-28 NOTE — CHART NOTE - NSCHARTNOTEFT_GEN_A_CORE
Patient seen and examined, no complaints   Reports last HD session was Monday. Was told he was supposed have Permacath removed but has not been able to do it yet. Has been getting HD via AVF in the meantime   EP- plan for aflutter ablation and PPM placement Friday   Resume Eliquis in the meantime   Remainder of plan as per H&P

## 2023-06-28 NOTE — H&P ADULT - HISTORY OF PRESENT ILLNESS
77M w/ ____ transferred from [facility] for EP ablation.    Pt. initially presented to [facility] on ____ with____.    Currently, the patient _____ 77M w/ HFpEF (EF 50%), CAD s/p NSTEMI 2/2 cocaine use (2019), pAF (on Eliquis), ESRD on HD (MWF via LUE AVF), HTN, BPH, recurrent UTI transferred from [facility] for PPM placement with EP.    Pt. initially presented to [facility] on 6/19 with shortness of breath and cough productive of white sputum. Pt. reported feeling SOB while laying flat for 2-3 weeks and with ambulation, states he can barely walk 5-10 feet without having to stop to catch his breath. At the time, he denied any chest pain, palpitations, dizziness/lightheadedness, PND, diaphoresis, abdominal pain.     Currently, the patient _____ 77M w/ HFpEF (EF 50% 5/2023), CAD s/p NSTEMI 2/2 cocaine use (2019), pAF/aflutter (on Eliquis), ESRD on HD (MWF via LUE AVF), HTN, BPH, recurrent UTI transferred from [facility] for PPM placement with EP.    Pt. initially presented to [facility] on 6/19 with shortness of breath and cough productive of white sputum. Pt. reported feeling SOB while laying flat for 2-3 weeks and with ambulation, states he could barely walk 5-10 feet without having to stop to catch his breath. At the time, he denied any chest pain, palpitations, dizziness/lightheadedness, PND, diaphoresis, abdominal pain. Labs at [facility] remarkable for mildly elevated pro-BNP. CXR on admission to [facility] was negative for acute pulmonary pathology. TTE showed EF 50%, no WMA, mildly increased LV wall thickness, dec RVSF, RV volume overload, mod dilated RA, mild AS, mild MS, mod-severe TR. He was treated with IV Lasix 40mg qd and standing duonebs with improvement. While at [facility], pt. was noted to have an episode of monomorphic VT to 180s-200s that lasted 1 minute; he remained in aflutter w/ frequent PVCs on telemetry. Although he was generally asymptomatic, he was treated with metoprolol succinate 25mg qd and continued on Eliquis 5mg BID for AC. He was recommended for transfer to [facility] for PPM placement due to frequent episodes of alternating tachycardia and bradycardia. Pt. was also treated with midodrine 10mg TID for unclear reasons and was continued on ASA 81mg qd, atorvastatin 40mg qhs for stable CAD. He was also treated for a UTI with IV ceftriaxone (unknown number of days).     On evaluation currently, the patient feels well and states his SOB is resolved. He states he was transferred to [facility] from [facility] for pacemaker placement for his "breathing." He denies orthopnea, PND, SOB, chest pain, palpitations, dizziness/lightheadedness, fevers, chills, cough, abdominal pain, urinary symptoms.

## 2023-06-28 NOTE — PHYSICAL THERAPY INITIAL EVALUATION ADULT - PERTINENT HX OF CURRENT PROBLEM, REHAB EVAL
Per documentation, pt. presented to [facility] for SOB. Pt. transferred from [facility] for PPM placement for tachy-shy syndrome, possibly secondary to ADHF vs. primary underlying arrythmia.

## 2023-06-28 NOTE — H&P ADULT - PROBLEM SELECTOR PLAN 7
Hx of recurrent UTI, was started on IV ceftriaxone at [facility] (unclear start date)  - pt. currently denies any urinary symptoms including hesitancy, incontinence, dysuria, frequency, urgency    Plan:  - monitor off antibiotics for now   - monitor vitals q4h; if febrile can restart antibiotics Hx of recurrent UTI, was started on IV ceftriaxone at [facility] (unclear start date)  - pt. currently denies any urinary symptoms including hesitancy, incontinence, dysuria, frequency, urgency    Plan:  - monitor off antibiotics for now   - monitor vitals q4h, but if febrile can restart antibiotics

## 2023-06-28 NOTE — CONSULT NOTE ADULT - SUBJECTIVE AND OBJECTIVE BOX
Source and chart:    HPI:  This is a 77 year old man with a pmh of HFpEF (EF 50% 5/2023), CAD s/p NSTEMI 2/2 cocaine use (2019), pAF/aflutter (on Eliquis), ESRD on HD (MWF via LUE AVF), HTN, BPH, and recurrent UTI who was presented to [facility] for SOB on 6/19/2023.    Patient admitted to SOB and orthopnea (sleeping on 2 pillows) x 2-3 weeks. Patient stated that he is still unable to lay flat. Patient stated that his SOB prevents him from walking. He stated that he can barely walk 5 feet without having to stop due to SOB. He also admitted to a productive cough with white sputum. Patient denied fever, chills, diaphoresis, HA, lightheadedness, dizziness, CP, palpitation, abdominal pain, n/v/d, hematuria, melena, hematochezia, numbness and tingling.  Labs at [facility] remarkable for mildly elevated pro-BNP. CXR on admission to [facility] was negative for acute pulmonary pathology. TTE showed EF 50%, no WMA, mildly increased LV wall thickness, dec RVSF, RV volume overload, mod dilated RA, mild AS, mild MS, mod-severe TR. He was treated with IV Lasix 40mg qd and standing duonebs with improvement. While at [facility], pt. was noted to have an episode of monomorphic VT to 180s-200s that lasted 1 minute per documentation. No telemetry strip or EKG was sent to [facility] from [facility]. Dr. Barton was sent an EKG showing slow Aflutter. Per documentation, patient was asymptomatic, he was treated with metoprolol succinate 25mg qd and continued on Eliquis 5mg BID for AC. He was recommended for transfer to [facility] for Aflutter ablation with possible PPM placement. He was also treated for a UTI with IV ceftriaxone (unknown number of days) and was found to be MRSA positive. EP was called to discussed ablation and PPM. Patient stated that he does not make his medical decision and he leave it up to his sister and daughter       PAST MEDICAL & SURGICAL HISTORY:  Recurrent UTI  CAD (coronary artery disease)  ESRD on dialysis  BPH (benign prostatic hyperplasia)  Atrial fibrillation and flutter  HTN (hypertension)  S/P urological surgery    MEDICATIONS  (STANDING):  albuterol/ipratropium for Nebulization 3 milliLiter(s) Nebulizer every 6 hours  aspirin enteric coated 81 milliGRAM(s) Oral daily  atorvastatin 40 milliGRAM(s) Oral at bedtime  calcium acetate 667 milliGRAM(s) Oral three times a day with meals  chlorhexidine 2% Cloths 1 Application(s) Topical <User Schedule>  ferrous    sulfate 325 milliGRAM(s) Oral daily  metoprolol succinate ER 25 milliGRAM(s) Oral daily  multivitamin 1 Tablet(s) Oral daily  pantoprazole    Tablet 40 milliGRAM(s) Oral before breakfast  senna 2 Tablet(s) Oral at bedtime  tamsulosin 0.4 milliGRAM(s) Oral at bedtime    MEDICATIONS  (PRN):      FAMILY HISTORY:  patient is unsure about his family hx    SOCIAL HISTORY:  CIGARETTES: Denied  ALCOHOL: denied   ILLICIT DRUG USES: denied    REVIEW OF SYSTEMS:  CONSTITUTIONAL: No fever, weight loss, chills, shakes, or fatigue  RESPIRATORY: per HPI  CARDIOVASCULAR: per  HPI  GASTROINTESTINAL: No abdominal  or epigastric pain, nausea, vomiting, hematemesis, diarrhea, constipation, melena or bright red blood.  GENITOURINARY: No dysuria, nocturia, hematuria, or urinary incontinence  NEUROLOGICAL: No headaches, memory loss, slurred speech, limb weakness, loss of strength, numbness, or tremors  MUSCULOSKELETAL: No joint pain or swelling, muscle, back, or extremity pain  PSYCHIATRIC: No depression, anxiety, or difficulty sleeping        Vital Signs Last 24 Hrs  T(C): 36.7 (28 Jun 2023 05:00), Max: 36.7 (28 Jun 2023 03:00)  T(F): 98 (28 Jun 2023 05:00), Max: 98 (28 Jun 2023 03:00)  HR: 94 (28 Jun 2023 05:00) (90 - 94)  BP: 112/94 (28 Jun 2023 05:00) (112/94 - 121/70)  BP(mean): --  RR: 18 (28 Jun 2023 05:00) (18 - 18)  SpO2: 99% (28 Jun 2023 05:00) (99% - 99%)    Parameters below as of 28 Jun 2023 05:00  Patient On (Oxygen Delivery Method): room air        PHYSICAL EXAM:  GENERAL: Well appearing, speaking in full sentence, in NAD  HEART: S1S2 RRR  PULMONARY:CTABL, normal respiratory effort, +R chest Permacath site c/d/i  ABDOMEN: Bowel sounds present, soft, NDNT  EXTREMITIES:  Warm, well -perfused, no pedal edema, distal pulses present +LUE AVF w/ palpable thrill  NEURO: AOx3      INTERPRETATION OF TELEMETRY: SR with frequent PVC and 3-4 beats NSVT    ECG: no EKg was in the chart    I&O's Detail    27 Jun 2023 07:01  -  28 Jun 2023 07:00  --------------------------------------------------------  IN:    Oral Fluid: 85 mL  Total IN: 85 mL    OUT:    Voided (mL): 200 mL  Total OUT: 200 mL    Total NET: -115 mL          LABS:                        9.4    7.96  )-----------( 287      ( 28 Jun 2023 06:07 )             30.6     06-28    137  |  99  |  29<H>  ----------------------------<  100<H>  3.8   |  25  |  2.68<H>    Ca    9.1      28 Jun 2023 06:07  Phos  3.3     06-28  Mg     2.70     06-28    TPro  6.9  /  Alb  3.2<L>  /  TBili  0.4  /  DBili  x   /  AST  12  /  ALT  11  /  AlkPhos  118  06-28        PT/INR - ( 27 Jun 2023 23:16 )   PT: 20.1 sec;   INR: 1.72 ratio         PTT - ( 27 Jun 2023 23:16 )  PTT:38.4 sec  Urinalysis Basic - ( 28 Jun 2023 06:07 )    Color: x / Appearance: x / SG: x / pH: x  Gluc: 100 mg/dL / Ketone: x  / Bili: x / Urobili: x   Blood: x / Protein: x / Nitrite: x   Leuk Esterase: x / RBC: x / WBC x   Sq Epi: x / Non Sq Epi: x / Bacteria: x      BNP  I&O's Detail    27 Jun 2023 07:01  -  28 Jun 2023 07:00  --------------------------------------------------------  IN:    Oral Fluid: 85 mL  Total IN: 85 mL    OUT:    Voided (mL): 200 mL  Total OUT: 200 mL    Total NET: -115 mL        Daily Height in cm: 180.34 (27 Jun 2023 18:55)    Daily           
[facility] Division of Kidney Diseases & Hypertension  INITIAL CONSULT NOTE  --------------------------------------------------------------------------------  HPI: This is a 77 years old man with a history of ESRD on HD TIW [facility] who presents as a transfer from outside hospital.  The patient reports that he has been on dialysis for 2 years.  He is a poor historian overall but does recall an episode of line sepsis about one year ago that required hospitalization.  At this admission he presented to [facility] with SOB found to have atrial flutter.  Patient reports that he is here for a pacemaker.  He states that although he has a tunneled HD catheter he has been getting HD through an LUE AVF that has been working well.  He does not know how much fluid they usually remove with dialysis but does report that he does get low BP with HD at time.  Upon evaluation today he denies cp sob.  Feels well overall.    PAST HISTORY  --------------------------------------------------------------------------------  PAST MEDICAL & SURGICAL HISTORY:  Recurrent UTI      CAD (coronary artery disease)      ESRD on dialysis      BPH (benign prostatic hyperplasia)      Atrial fibrillation and flutter      HTN (hypertension)      S/P urological surgery        FAMILY HISTORY:  No pertinent family history of ESKD      PAST SOCIAL HISTORY: retired [employer] worker, lives with sister, wife .    ALLERGIES & MEDICATIONS  --------------------------------------------------------------------------------  Allergies    No Known Allergies    Intolerances      Standing Inpatient Medications  albuterol/ipratropium for Nebulization 3 milliLiter(s) Nebulizer every 6 hours  aspirin enteric coated 81 milliGRAM(s) Oral daily  atorvastatin 40 milliGRAM(s) Oral at bedtime  calcium acetate 667 milliGRAM(s) Oral three times a day with meals  chlorhexidine 2% Cloths 1 Application(s) Topical <User Schedule>  ferrous    sulfate 325 milliGRAM(s) Oral daily  metoprolol succinate ER 25 milliGRAM(s) Oral daily  multivitamin 1 Tablet(s) Oral daily  pantoprazole    Tablet 40 milliGRAM(s) Oral before breakfast  senna 2 Tablet(s) Oral at bedtime  tamsulosin 0.4 milliGRAM(s) Oral at bedtime    PRN Inpatient Medications      REVIEW OF SYSTEMS  --------------------------------------------------------------------------------  Gen: No fever  Skin: No rashes  Head/Eyes/Ears/Mouth: No headache;  Respiratory: No dyspnea, cough  CV: No chest pain  GI: No abdominal pain  : No increased frequency  MSK: No joint pain/swelling  Psych: No significant nervousness, anxiety, stress, depression    All other systems were reviewed and are negative, except as noted.    VITALS/PHYSICAL EXAM  --------------------------------------------------------------------------------  T(C): 36.7 (23 @ 09:00), Max: 36.7 (23 @ 03:00)  HR: 71 (23 @ 09:58) (71 - 94)  BP: 109/64 (23 @ 09:00) (109/64 - 121/70)  RR: 17 (23 @ 09:00) (17 - 18)  SpO2: 98% (23 @ 09:58) (98% - 99%)  Wt(kg): --  Height (cm): 180.3 (23 @ 18:55)  Weight (kg): 82.4 (23 @ 18:55)  BMI (kg/m2): 25.3 (23 @ 18:55)  BSA (m2): 2.02 (23 @ 18:55)      23 @ 07:01  -  23 @ 07:00  --------------------------------------------------------  IN: 85 mL / OUT: 200 mL / NET: -115 mL    23 @ 07:01  -  23 @ 13:06  --------------------------------------------------------  IN: 120 mL / OUT: 200 mL / NET: -80 mL      Physical Exam:  	Gen: no distress  	HEENT: anicteric  	Pulm: CTA B/L  	CV: RRR, S1S2; no rub  	Abd: soft  	: No suprapubic tenderness  	LE: no edema  	Neuro: awake alert oriented conversant  	Psych: Normal affect and mood  	Vascular access: right chest wall tunneled HD catheter.  VIC ALEJO with bruit and thrill.    LABS/STUDIES  --------------------------------------------------------------------------------              9.4    7.96  >-----------<  287      [23 @ 06:07]              30.6     137  |  99  |  29  ----------------------------<  100      [23 @ 06:07]  3.8   |  25  |  2.68        Ca     9.1     [23 @ 06:07]      Mg     2.70     [23 @ 06:07]      Phos  3.3     [23 @ 06:07]    TPro  6.9  /  Alb  3.2  /  TBili  0.4  /  DBili  x   /  AST  12  /  ALT  11  /  AlkPhos  118  [23 @ 06:07]    PT/INR: PT 20.1 , INR 1.72       [23 @ 23:16]  PTT: 38.4       [23 @ 23:16]      Creatinine Trend:  SCr 2.68 [ @ 06:07]  SCr 2.57 [ 23:16]    Urinalysis - [23 @ 06:07]      Color  / Appearance  / SG  / pH       Gluc 100 / Ketone   / Bili  / Urobili        Blood  / Protein  / Leuk Est  / Nitrite       RBC  / WBC  / Hyaline  / Gran  / Sq Epi  / Non Sq Epi  / Bacteria       Lipid: chol 101, TG 64, HDL 39, LDL --      [23 @ 06:07]

## 2023-06-29 PROBLEM — I25.10 ATHEROSCLEROTIC HEART DISEASE OF NATIVE CORONARY ARTERY WITHOUT ANGINA PECTORIS: Chronic | Status: ACTIVE | Noted: 2023-06-28

## 2023-06-29 PROBLEM — Z00.00 ENCOUNTER FOR PREVENTIVE HEALTH EXAMINATION: Status: ACTIVE | Noted: 2023-06-29

## 2023-06-29 PROBLEM — N39.0 URINARY TRACT INFECTION, SITE NOT SPECIFIED: Chronic | Status: ACTIVE | Noted: 2023-06-28

## 2023-06-29 PROBLEM — N40.0 BENIGN PROSTATIC HYPERPLASIA WITHOUT LOWER URINARY TRACT SYMPTOMS: Chronic | Status: ACTIVE | Noted: 2023-06-28

## 2023-06-29 PROBLEM — N18.6 END STAGE RENAL DISEASE: Chronic | Status: ACTIVE | Noted: 2023-06-28

## 2023-06-29 PROBLEM — I48.92 UNSPECIFIED ATRIAL FLUTTER: Chronic | Status: ACTIVE | Noted: 2023-06-28

## 2023-06-29 PROBLEM — I10 ESSENTIAL (PRIMARY) HYPERTENSION: Chronic | Status: ACTIVE | Noted: 2023-06-28

## 2023-06-29 LAB
ALBUMIN SERPL ELPH-MCNC: 3.2 G/DL — LOW (ref 3.3–5)
ALP SERPL-CCNC: 121 U/L — HIGH (ref 40–120)
ALT FLD-CCNC: 12 U/L — SIGNIFICANT CHANGE UP (ref 4–41)
ANION GAP SERPL CALC-SCNC: 14 MMOL/L — SIGNIFICANT CHANGE UP (ref 7–14)
AST SERPL-CCNC: 17 U/L — SIGNIFICANT CHANGE UP (ref 4–40)
BASOPHILS # BLD AUTO: 0.05 K/UL — SIGNIFICANT CHANGE UP (ref 0–0.2)
BASOPHILS NFR BLD AUTO: 0.6 % — SIGNIFICANT CHANGE UP (ref 0–2)
BILIRUB SERPL-MCNC: 0.7 MG/DL — SIGNIFICANT CHANGE UP (ref 0.2–1.2)
BLD GP AB SCN SERPL QL: NEGATIVE — SIGNIFICANT CHANGE UP
BUN SERPL-MCNC: 18 MG/DL — SIGNIFICANT CHANGE UP (ref 7–23)
CALCIUM SERPL-MCNC: 9.2 MG/DL — SIGNIFICANT CHANGE UP (ref 8.4–10.5)
CHLORIDE SERPL-SCNC: 95 MMOL/L — LOW (ref 98–107)
CO2 SERPL-SCNC: 26 MMOL/L — SIGNIFICANT CHANGE UP (ref 22–31)
CREAT SERPL-MCNC: 1.97 MG/DL — HIGH (ref 0.5–1.3)
EGFR: 34 ML/MIN/1.73M2 — LOW
EOSINOPHIL # BLD AUTO: 0.43 K/UL — SIGNIFICANT CHANGE UP (ref 0–0.5)
EOSINOPHIL NFR BLD AUTO: 5.1 % — SIGNIFICANT CHANGE UP (ref 0–6)
GLUCOSE SERPL-MCNC: 86 MG/DL — SIGNIFICANT CHANGE UP (ref 70–99)
HBV CORE IGM SER-ACNC: SIGNIFICANT CHANGE UP
HBV SURFACE AB SER-ACNC: <3 MIU/ML — LOW
HBV SURFACE AG SER-ACNC: SIGNIFICANT CHANGE UP
HCT VFR BLD CALC: 30.4 % — LOW (ref 39–50)
HCV AB S/CO SERPL IA: 0.24 S/CO — SIGNIFICANT CHANGE UP (ref 0–0.99)
HCV AB SERPL-IMP: SIGNIFICANT CHANGE UP
HGB BLD-MCNC: 9.9 G/DL — LOW (ref 13–17)
IANC: 5.22 K/UL — SIGNIFICANT CHANGE UP (ref 1.8–7.4)
IMM GRANULOCYTES NFR BLD AUTO: 0.4 % — SIGNIFICANT CHANGE UP (ref 0–0.9)
LYMPHOCYTES # BLD AUTO: 1.83 K/UL — SIGNIFICANT CHANGE UP (ref 1–3.3)
LYMPHOCYTES # BLD AUTO: 21.6 % — SIGNIFICANT CHANGE UP (ref 13–44)
MAGNESIUM SERPL-MCNC: 2 MG/DL — SIGNIFICANT CHANGE UP (ref 1.6–2.6)
MCHC RBC-ENTMCNC: 31.9 PG — SIGNIFICANT CHANGE UP (ref 27–34)
MCHC RBC-ENTMCNC: 32.6 GM/DL — SIGNIFICANT CHANGE UP (ref 32–36)
MCV RBC AUTO: 98.1 FL — SIGNIFICANT CHANGE UP (ref 80–100)
MONOCYTES # BLD AUTO: 0.9 K/UL — SIGNIFICANT CHANGE UP (ref 0–0.9)
MONOCYTES NFR BLD AUTO: 10.6 % — SIGNIFICANT CHANGE UP (ref 2–14)
NEUTROPHILS # BLD AUTO: 5.22 K/UL — SIGNIFICANT CHANGE UP (ref 1.8–7.4)
NEUTROPHILS NFR BLD AUTO: 61.7 % — SIGNIFICANT CHANGE UP (ref 43–77)
NRBC # BLD: 0 /100 WBCS — SIGNIFICANT CHANGE UP (ref 0–0)
NRBC # FLD: 0 K/UL — SIGNIFICANT CHANGE UP (ref 0–0)
PHOSPHATE SERPL-MCNC: 2.8 MG/DL — SIGNIFICANT CHANGE UP (ref 2.5–4.5)
PLATELET # BLD AUTO: 266 K/UL — SIGNIFICANT CHANGE UP (ref 150–400)
POTASSIUM SERPL-MCNC: 3.9 MMOL/L — SIGNIFICANT CHANGE UP (ref 3.5–5.3)
POTASSIUM SERPL-SCNC: 3.9 MMOL/L — SIGNIFICANT CHANGE UP (ref 3.5–5.3)
PROT SERPL-MCNC: 7.4 G/DL — SIGNIFICANT CHANGE UP (ref 6–8.3)
RBC # BLD: 3.1 M/UL — LOW (ref 4.2–5.8)
RBC # FLD: 13.9 % — SIGNIFICANT CHANGE UP (ref 10.3–14.5)
RH IG SCN BLD-IMP: POSITIVE — SIGNIFICANT CHANGE UP
RH IG SCN BLD-IMP: POSITIVE — SIGNIFICANT CHANGE UP
SODIUM SERPL-SCNC: 135 MMOL/L — SIGNIFICANT CHANGE UP (ref 135–145)
WBC # BLD: 8.46 K/UL — SIGNIFICANT CHANGE UP (ref 3.8–10.5)
WBC # FLD AUTO: 8.46 K/UL — SIGNIFICANT CHANGE UP (ref 3.8–10.5)

## 2023-06-29 PROCEDURE — 93010 ELECTROCARDIOGRAM REPORT: CPT | Mod: 77

## 2023-06-29 PROCEDURE — 99232 SBSQ HOSP IP/OBS MODERATE 35: CPT

## 2023-06-29 PROCEDURE — 93010 ELECTROCARDIOGRAM REPORT: CPT

## 2023-06-29 PROCEDURE — 33274 TCAT INSJ/RPL PERM LDLS PM: CPT

## 2023-06-29 RX ADMIN — MUPIROCIN 1 APPLICATION(S): 20 OINTMENT TOPICAL at 05:50

## 2023-06-29 RX ADMIN — MUPIROCIN 1 APPLICATION(S): 20 OINTMENT TOPICAL at 17:46

## 2023-06-29 RX ADMIN — ATORVASTATIN CALCIUM 40 MILLIGRAM(S): 80 TABLET, FILM COATED ORAL at 21:14

## 2023-06-29 RX ADMIN — Medication 25 MILLIGRAM(S): at 05:49

## 2023-06-29 RX ADMIN — CHLORHEXIDINE GLUCONATE 1 APPLICATION(S): 213 SOLUTION TOPICAL at 05:50

## 2023-06-29 RX ADMIN — Medication 667 MILLIGRAM(S): at 17:40

## 2023-06-29 RX ADMIN — PANTOPRAZOLE SODIUM 40 MILLIGRAM(S): 20 TABLET, DELAYED RELEASE ORAL at 05:49

## 2023-06-29 RX ADMIN — APIXABAN 5 MILLIGRAM(S): 2.5 TABLET, FILM COATED ORAL at 05:50

## 2023-06-29 RX ADMIN — Medication 3 MILLILITER(S): at 04:44

## 2023-06-29 RX ADMIN — Medication 3 MILLILITER(S): at 09:53

## 2023-06-29 RX ADMIN — TAMSULOSIN HYDROCHLORIDE 0.4 MILLIGRAM(S): 0.4 CAPSULE ORAL at 21:14

## 2023-06-29 RX ADMIN — Medication 325 MILLIGRAM(S): at 17:41

## 2023-06-29 RX ADMIN — Medication 3 MILLILITER(S): at 21:59

## 2023-06-29 RX ADMIN — Medication 81 MILLIGRAM(S): at 17:41

## 2023-06-29 RX ADMIN — Medication 1 TABLET(S): at 17:41

## 2023-06-29 NOTE — CHART NOTE - NSCHARTNOTEFT_GEN_A_CORE
Patient is s/p micra PPM via R groin access. Patient without any complaints. Site is stable with no hematoma or active bleed noted. No swelling, dressing is clean/intact/dry. Femoral pulses palpable. Strength intact and ROM intact. Capillary refill < 2 seconds. Will continue to monitor closely.     Sonia Sanon PA-C

## 2023-06-29 NOTE — PROGRESS NOTE ADULT - PROBLEM SELECTOR PLAN 1
Pt. 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 primary underlying arrythmia. Currently on metoprolol succinate 25mg qd for rate control and Eliquis 5mg BID for AC  - initial EKG at [facility] showing rate-controlled aflutter w/ frequent ectopy; subsequent EKG showing 2:1 aflutter w/ PVCs  - EKG here showing sinus rhythm w/ frequent PVCs, no acute ST/T-wave changes, HR 98, QTc 454ms  - tele: afib w/ RVR vs. sinus rhythm w/ frequent ectopy  - [id number] score 3    Plan:  - S/p Micra placement 6/29   - c/w metoprolol succinate 25mg qd for rate control  - Cont Eliquis   - monitor on telemetry and vital signs q4h  - monitor electrolytes and replete for K>4, Mg>2

## 2023-06-29 NOTE — CHART NOTE - NSCHARTNOTEFT_GEN_A_CORE
Right groin site check s/p Micra PPM- site with device in place, site soft clean, no drainage/bleeding.

## 2023-06-29 NOTE — PROGRESS NOTE ADULT - PROBLEM SELECTOR PLAN 7
Hx of recurrent UTI, was started on IV ceftriaxone at [facility] (unclear start date)  - pt. currently denies any urinary symptoms including hesitancy, incontinence, dysuria, frequency, urgency    Plan:  - monitor off antibiotics for now   - monitor vitals q4h, but if febrile can restart antibiotics

## 2023-06-29 NOTE — PROGRESS NOTE ADULT - SUBJECTIVE AND OBJECTIVE BOX
Patient is a 77y old  Male who presents with a chief complaint of PPM placement with EP (28 Jun 2023 13:04)      PAST MEDICAL & SURGICAL HISTORY:  Urinary tract infection associated with catheterization of urinary tract, unspecified indwelling urinary catheter type, subsequent encounter    Recurrent UTI    CAD (coronary artery disease)    ESRD on dialysis    BPH (benign prostatic hyperplasia)    Atrial fibrillation and flutter    HTN (hypertension)    S/P urological surgery        MEDICATIONS  (STANDING):  albuterol/ipratropium for Nebulization 3 milliLiter(s) Nebulizer every 6 hours  apixaban 5 milliGRAM(s) Oral every 12 hours  aspirin enteric coated 81 milliGRAM(s) Oral daily  atorvastatin 40 milliGRAM(s) Oral at bedtime  calcium acetate 667 milliGRAM(s) Oral three times a day with meals  chlorhexidine 2% Cloths 1 Application(s) Topical <User Schedule>  ferrous    sulfate 325 milliGRAM(s) Oral daily  metoprolol succinate ER 25 milliGRAM(s) Oral daily  midodrine. 10 milliGRAM(s) Oral <User Schedule>  multivitamin 1 Tablet(s) Oral daily  mupirocin 2% Ointment 1 Application(s) Both Nostrils two times a day  pantoprazole    Tablet 40 milliGRAM(s) Oral before breakfast  senna 2 Tablet(s) Oral at bedtime  tamsulosin 0.4 milliGRAM(s) Oral at bedtime    MEDICATIONS  (PRN):  sodium chloride 0.9% Bolus. 100 milliLiter(s) IV Bolus every 5 minutes PRN SBP LESS THAN or EQUAL to 90 mmHg            Vital Signs Last 24 Hrs  T(C): 36.4 (29 Jun 2023 08:30), Max: 36.7 (28 Jun 2023 09:00)  T(F): 97.6 (29 Jun 2023 08:30), Max: 98 (28 Jun 2023 09:00)  HR: 109 (29 Jun 2023 05:45) (71 - 109)  BP: 112/69 (29 Jun 2023 08:30) (102/61 - 118/78)  BP(mean): --  RR: 18 (29 Jun 2023 08:30) (16 - 18)  SpO2: 99% (29 Jun 2023 08:30) (96% - 100%)    Parameters below as of 29 Jun 2023 08:30  Patient On (Oxygen Delivery Method): room air                INTERPRETATION OF TELEMETRY:    ECG:        LABS:                        9.4    7.96  )-----------( 287      ( 28 Jun 2023 06:07 )             30.6     06-28    137  |  99  |  29<H>  ----------------------------<  100<H>  3.8   |  25  |  2.68<H>    Ca    9.1      28 Jun 2023 06:07  Phos  3.3     06-28  Mg     2.70     06-28    TPro  6.9  /  Alb  3.2<L>  /  TBili  0.4  /  DBili  x   /  AST  12  /  ALT  11  /  AlkPhos  118  06-28        PT/INR - ( 27 Jun 2023 23:16 )   PT: 20.1 sec;   INR: 1.72 ratio         PTT - ( 27 Jun 2023 23:16 )  PTT:38.4 sec  Urinalysis Basic - ( 28 Jun 2023 06:07 )    Color: x / Appearance: x / SG: x / pH: x  Gluc: 100 mg/dL / Ketone: x  / Bili: x / Urobili: x   Blood: x / Protein: x / Nitrite: x   Leuk Esterase: x / RBC: x / WBC x   Sq Epi: x / Non Sq Epi: x / Bacteria: x      I&O's Summary    28 Jun 2023 07:01  -  29 Jun 2023 07:00  --------------------------------------------------------  IN: 820 mL / OUT: 2100 mL / NET: -1280 mL      BNP  RADIOLOGY & ADDITIONAL STUDIES:      PHYSICAL EXAM:    GENERAL: In no apparent distress, well nourished, and hydrated.  HEAD:  Atraumatic, Normocephalic  EYES: EOMI, PERRLA, conjunctiva and sclera clear  ENMT: No tonsillar erythema, exudates, or enlargement; Moist mucous membranes, Good dentition, No lesions  NECK: Supple and normal thyroid.  No JVD or carotid bruit.  Carotid pulse is 2+ bilaterally.  HEART: Regular rate and rhythm; No murmurs, rubs, or gallops.  PULMONARY: Clear to auscultation and percussion.  No rales, wheezing, or rhonchi bilaterally.  ABDOMEN: Soft, Nontender, Nondistended; Bowel sounds present  EXTREMITIES:  2+ Peripheral Pulses, No clubbing, cyanosis, or edema  LYMPH: No lymphadenopathy noted  NEUROLOGICAL: Grossly nonfocal         Patient is seen and examined. He denies any chest pain, SOB, palpitations or dizziness      PAST MEDICAL & SURGICAL HISTORY:  Urinary tract infection associated with catheterization of urinary tract, unspecified indwelling urinary catheter type, subsequent encounter    Recurrent UTI    CAD (coronary artery disease)    ESRD on dialysis    BPH (benign prostatic hyperplasia)    Atrial fibrillation and flutter    HTN (hypertension)    S/P urological surgery        MEDICATIONS  (STANDING):  albuterol/ipratropium for Nebulization 3 milliLiter(s) Nebulizer every 6 hours  apixaban 5 milliGRAM(s) Oral every 12 hours  aspirin enteric coated 81 milliGRAM(s) Oral daily  atorvastatin 40 milliGRAM(s) Oral at bedtime  calcium acetate 667 milliGRAM(s) Oral three times a day with meals  chlorhexidine 2% Cloths 1 Application(s) Topical <User Schedule>  ferrous    sulfate 325 milliGRAM(s) Oral daily  metoprolol succinate ER 25 milliGRAM(s) Oral daily  midodrine. 10 milliGRAM(s) Oral <User Schedule>  multivitamin 1 Tablet(s) Oral daily  mupirocin 2% Ointment 1 Application(s) Both Nostrils two times a day  pantoprazole    Tablet 40 milliGRAM(s) Oral before breakfast  senna 2 Tablet(s) Oral at bedtime  tamsulosin 0.4 milliGRAM(s) Oral at bedtime    MEDICATIONS  (PRN):  sodium chloride 0.9% Bolus. 100 milliLiter(s) IV Bolus every 5 minutes PRN SBP LESS THAN or EQUAL to 90 mmHg    Vital Signs Last 24 Hrs  T(C): 36.4 (29 Jun 2023 08:30), Max: 36.7 (28 Jun 2023 09:00)  T(F): 97.6 (29 Jun 2023 08:30), Max: 98 (28 Jun 2023 09:00)  HR: 109 (29 Jun 2023 05:45) (71 - 109)  BP: 112/69 (29 Jun 2023 08:30) (102/61 - 118/78)  BP(mean): --  RR: 18 (29 Jun 2023 08:30) (16 - 18)  SpO2: 99% (29 Jun 2023 08:30) (96% - 100%)    Parameters below as of 29 Jun 2023 08:30  Patient On (Oxygen Delivery Method): room air    INTERPRETATION OF TELEMETRY: Sinus rhythm alternating with atrial fibrillation with HR 90s-130s, PVCs    LABS:                        9.4    7.96  )-----------( 287      ( 28 Jun 2023 06:07 )             30.6     06-28    137  |  99  |  29<H>  ----------------------------<  100<H>  3.8   |  25  |  2.68<H>    Ca    9.1      28 Jun 2023 06:07  Phos  3.3     06-28  Mg     2.70     06-28    TPro  6.9  /  Alb  3.2<L>  /  TBili  0.4  /  DBili  x   /  AST  12  /  ALT  11  /  AlkPhos  118  06-28        PT/INR - ( 27 Jun 2023 23:16 )   PT: 20.1 sec;   INR: 1.72 ratio         PTT - ( 27 Jun 2023 23:16 )  PTT:38.4 sec  Urinalysis Basic - ( 28 Jun 2023 06:07 )    Color: x / Appearance: x / SG: x / pH: x  Gluc: 100 mg/dL / Ketone: x  / Bili: x / Urobili: x   Blood: x / Protein: x / Nitrite: x   Leuk Esterase: x / RBC: x / WBC x   Sq Epi: x / Non Sq Epi: x / Bacteria: x      I&O's Summary    28 Jun 2023 07:01  -  29 Jun 2023 07:00  --------------------------------------------------------  IN: 820 mL / OUT: 2100 mL / NET: -1280 mL        PHYSICAL EXAM:    GENERAL: In no apparent distress, well nourished, and hydrated.  HEART: Irregular rate and rhythm; No murmurs, rubs, or gallops.  PULMONARY: Clear to auscultation and percussion.  No rales, wheezing, or rhonchi bilaterally.  ABDOMEN: Soft, Nontender, Nondistended; Bowel sounds present  EXTREMITIES:  2+ Peripheral Pulses, No clubbing, cyanosis, or edema

## 2023-06-30 LAB
ANION GAP SERPL CALC-SCNC: 13 MMOL/L — SIGNIFICANT CHANGE UP (ref 7–14)
ANION GAP SERPL CALC-SCNC: 14 MMOL/L — SIGNIFICANT CHANGE UP (ref 7–14)
BUN SERPL-MCNC: 29 MG/DL — HIGH (ref 7–23)
BUN SERPL-MCNC: 30 MG/DL — HIGH (ref 7–23)
CALCIUM SERPL-MCNC: 8.8 MG/DL — SIGNIFICANT CHANGE UP (ref 8.4–10.5)
CALCIUM SERPL-MCNC: 8.8 MG/DL — SIGNIFICANT CHANGE UP (ref 8.4–10.5)
CHLORIDE SERPL-SCNC: 95 MMOL/L — LOW (ref 98–107)
CHLORIDE SERPL-SCNC: 96 MMOL/L — LOW (ref 98–107)
CO2 SERPL-SCNC: 24 MMOL/L — SIGNIFICANT CHANGE UP (ref 22–31)
CO2 SERPL-SCNC: 25 MMOL/L — SIGNIFICANT CHANGE UP (ref 22–31)
CREAT SERPL-MCNC: 2.51 MG/DL — HIGH (ref 0.5–1.3)
CREAT SERPL-MCNC: 2.58 MG/DL — HIGH (ref 0.5–1.3)
EGFR: 25 ML/MIN/1.73M2 — LOW
EGFR: 26 ML/MIN/1.73M2 — LOW
GLUCOSE SERPL-MCNC: 102 MG/DL — HIGH (ref 70–99)
GLUCOSE SERPL-MCNC: 94 MG/DL — SIGNIFICANT CHANGE UP (ref 70–99)
HAV IGM SER-ACNC: SIGNIFICANT CHANGE UP
HBV CORE IGM SER-ACNC: SIGNIFICANT CHANGE UP
HBV SURFACE AG SER-ACNC: SIGNIFICANT CHANGE UP
HCT VFR BLD CALC: 29.8 % — LOW (ref 39–50)
HCV AB S/CO SERPL IA: 0.16 S/CO — SIGNIFICANT CHANGE UP (ref 0–0.99)
HCV AB SERPL-IMP: SIGNIFICANT CHANGE UP
HGB BLD-MCNC: 9.6 G/DL — LOW (ref 13–17)
MAGNESIUM SERPL-MCNC: 1.9 MG/DL — SIGNIFICANT CHANGE UP (ref 1.6–2.6)
MAGNESIUM SERPL-MCNC: 2.2 MG/DL — SIGNIFICANT CHANGE UP (ref 1.6–2.6)
MCHC RBC-ENTMCNC: 31.5 PG — SIGNIFICANT CHANGE UP (ref 27–34)
MCHC RBC-ENTMCNC: 32.2 GM/DL — SIGNIFICANT CHANGE UP (ref 32–36)
MCV RBC AUTO: 97.7 FL — SIGNIFICANT CHANGE UP (ref 80–100)
NRBC # BLD: 0 /100 WBCS — SIGNIFICANT CHANGE UP (ref 0–0)
NRBC # FLD: 0 K/UL — SIGNIFICANT CHANGE UP (ref 0–0)
PHOSPHATE SERPL-MCNC: 3.6 MG/DL — SIGNIFICANT CHANGE UP (ref 2.5–4.5)
PHOSPHATE SERPL-MCNC: 3.7 MG/DL — SIGNIFICANT CHANGE UP (ref 2.5–4.5)
PLATELET # BLD AUTO: 257 K/UL — SIGNIFICANT CHANGE UP (ref 150–400)
POTASSIUM SERPL-MCNC: 3.9 MMOL/L — SIGNIFICANT CHANGE UP (ref 3.5–5.3)
POTASSIUM SERPL-MCNC: 4.2 MMOL/L — SIGNIFICANT CHANGE UP (ref 3.5–5.3)
POTASSIUM SERPL-SCNC: 3.9 MMOL/L — SIGNIFICANT CHANGE UP (ref 3.5–5.3)
POTASSIUM SERPL-SCNC: 4.2 MMOL/L — SIGNIFICANT CHANGE UP (ref 3.5–5.3)
RBC # BLD: 3.05 M/UL — LOW (ref 4.2–5.8)
RBC # FLD: 13.9 % — SIGNIFICANT CHANGE UP (ref 10.3–14.5)
SODIUM SERPL-SCNC: 133 MMOL/L — LOW (ref 135–145)
SODIUM SERPL-SCNC: 134 MMOL/L — LOW (ref 135–145)
WBC # BLD: 6.37 K/UL — SIGNIFICANT CHANGE UP (ref 3.8–10.5)
WBC # FLD AUTO: 6.37 K/UL — SIGNIFICANT CHANGE UP (ref 3.8–10.5)

## 2023-06-30 PROCEDURE — 90935 HEMODIALYSIS ONE EVALUATION: CPT

## 2023-06-30 PROCEDURE — 99232 SBSQ HOSP IP/OBS MODERATE 35: CPT

## 2023-06-30 RX ORDER — METOPROLOL TARTRATE 50 MG
25 TABLET ORAL
Refills: 0 | Status: DISCONTINUED | OUTPATIENT
Start: 2023-06-30 | End: 2023-07-06

## 2023-06-30 RX ORDER — APIXABAN 2.5 MG/1
5 TABLET, FILM COATED ORAL
Refills: 0 | Status: DISCONTINUED | OUTPATIENT
Start: 2023-06-30 | End: 2023-06-30

## 2023-06-30 RX ORDER — POTASSIUM CHLORIDE 20 MEQ
10 PACKET (EA) ORAL
Refills: 0 | Status: COMPLETED | OUTPATIENT
Start: 2023-06-30 | End: 2023-06-30

## 2023-06-30 RX ORDER — METOPROLOL TARTRATE 50 MG
25 TABLET ORAL ONCE
Refills: 0 | Status: COMPLETED | OUTPATIENT
Start: 2023-06-30 | End: 2023-06-30

## 2023-06-30 RX ORDER — APIXABAN 2.5 MG/1
5 TABLET, FILM COATED ORAL
Refills: 0 | Status: DISCONTINUED | OUTPATIENT
Start: 2023-06-30 | End: 2023-07-06

## 2023-06-30 RX ORDER — MAGNESIUM SULFATE 500 MG/ML
1 VIAL (ML) INJECTION ONCE
Refills: 0 | Status: COMPLETED | OUTPATIENT
Start: 2023-06-30 | End: 2023-06-30

## 2023-06-30 RX ADMIN — ATORVASTATIN CALCIUM 40 MILLIGRAM(S): 80 TABLET, FILM COATED ORAL at 22:43

## 2023-06-30 RX ADMIN — Medication 3 MILLILITER(S): at 15:06

## 2023-06-30 RX ADMIN — Medication 667 MILLIGRAM(S): at 12:25

## 2023-06-30 RX ADMIN — Medication 100 GRAM(S): at 00:33

## 2023-06-30 RX ADMIN — Medication 1 TABLET(S): at 12:24

## 2023-06-30 RX ADMIN — Medication 100 MILLIEQUIVALENT(S): at 01:47

## 2023-06-30 RX ADMIN — Medication 100 MILLIEQUIVALENT(S): at 02:51

## 2023-06-30 RX ADMIN — MIDODRINE HYDROCHLORIDE 10 MILLIGRAM(S): 2.5 TABLET ORAL at 05:56

## 2023-06-30 RX ADMIN — TAMSULOSIN HYDROCHLORIDE 0.4 MILLIGRAM(S): 0.4 CAPSULE ORAL at 22:43

## 2023-06-30 RX ADMIN — APIXABAN 5 MILLIGRAM(S): 2.5 TABLET, FILM COATED ORAL at 17:26

## 2023-06-30 RX ADMIN — MUPIROCIN 1 APPLICATION(S): 20 OINTMENT TOPICAL at 12:28

## 2023-06-30 RX ADMIN — PANTOPRAZOLE SODIUM 40 MILLIGRAM(S): 20 TABLET, DELAYED RELEASE ORAL at 05:27

## 2023-06-30 RX ADMIN — Medication 3 MILLILITER(S): at 04:24

## 2023-06-30 RX ADMIN — SENNA PLUS 2 TABLET(S): 8.6 TABLET ORAL at 22:43

## 2023-06-30 RX ADMIN — Medication 81 MILLIGRAM(S): at 12:24

## 2023-06-30 RX ADMIN — Medication 3 MILLILITER(S): at 21:41

## 2023-06-30 RX ADMIN — Medication 100 MILLIEQUIVALENT(S): at 03:50

## 2023-06-30 RX ADMIN — CHLORHEXIDINE GLUCONATE 1 APPLICATION(S): 213 SOLUTION TOPICAL at 05:27

## 2023-06-30 RX ADMIN — Medication 325 MILLIGRAM(S): at 12:24

## 2023-06-30 RX ADMIN — Medication 667 MILLIGRAM(S): at 17:26

## 2023-06-30 RX ADMIN — Medication 25 MILLIGRAM(S): at 05:27

## 2023-06-30 RX ADMIN — Medication 25 MILLIGRAM(S): at 17:26

## 2023-06-30 RX ADMIN — Medication 25 MILLIGRAM(S): at 00:25

## 2023-06-30 NOTE — DIETITIAN INITIAL EVALUATION ADULT - PROBLEM SELECTOR PLAN 7
Hx of recurrent UTI, was started on IV ceftriaxone at [facility] (unclear start date)  - pt. currently denies any urinary symptoms including hesitancy, incontinence, dysuria, frequency, urgency    Plan:  - monitor off antibiotics for now   - monitor vitals q4h, but if febrile can restart antibiotics

## 2023-06-30 NOTE — PROGRESS NOTE ADULT - PROBLEM SELECTOR PLAN 7
Hx of recurrent UTI, was started on IV ceftriaxone at [facility] (unclear start date)  - pt. currently denies any urinary symptoms including hesitancy, incontinence, dysuria, frequency, urgency    Plan:  - monitor off antibiotics for now   - monitor vitals q4h, but if febrile can restart antibiotics

## 2023-06-30 NOTE — PROGRESS NOTE ADULT - PROBLEM SELECTOR PLAN 1
ESRD on HD TIW MWF.  Transferred from [facility].  Has tunneled catheter but has been using LUE AVF without issue.  Last HD was wednesday.  Seen and evaluated on HD today.  AVF working well at 400 BFR with normal access pressures.  Recommend another treatment with AVF and if tolerates will recommend removal of tunneled catheter but the patient does not have to stay in the hospital for this purpose - it can be done as an outpatient.  Hgb acceptable. ESRD on HD TIW MWF.  Transferred from [facility].  Has tunneled catheter but has been using LUE AVF without issue.  Last HD was wednesday.  Seen and evaluated on HD today.  AVF working well at 400 BFR with normal access pressures.  Recommend another treatment with AVF and if tolerates will recommend removal of tunneled catheter but the patient does not have to stay in the hospital for this purpose - it can be done as an outpatient.  Hgb acceptable.    Addendum: spoke with ACP.  patient's eliquis has been held for procedure.  This is an ideal time to remove tunneled HD catheter.  Spoke with patient he says at out side facility his AVF had been used 3 times.  tolerated 400 BFR today.  risk benefit favors taking opportunity to remove tunneled HD catheter while A/C is being held.

## 2023-06-30 NOTE — DIETITIAN INITIAL EVALUATION ADULT - REASON FOR ADMISSION
Heart disease  77M w/ HFpEF (EF 50% 5/2023), CAD s/p NSTEMI 2/2 cocaine use (2019), pAF/aflutter (on Eliquis), ESRD on HD (MWF via LUE AVF), HTN, BPH, recurrent UTI transferred from [facility] for PPM placement with EP, currently asymptomatic and HDS.

## 2023-06-30 NOTE — DIETITIAN INITIAL EVALUATION ADULT - PROBLEM SELECTOR PLAN 2
Hx of HFpEF (EF 50% 5/2023), s/p IV Lasix 40mg qd and standing duonebs at [facility] but not discharged on diuretics  - TTE ([facility], 5/2023): EF 50%, no WMA, mildly increased LV wall thickness, decreased RVSF, RV volume overload, mod dilated RA, mild AS, mild MS, mod-severe TR  - proBNP 3500 (though unreliable given ESRD)  - pt. appears clinically euvolemic at this time    Plan:  - c/w metoprolol succinate 25mg qd  - c/w Duonebs q6h  - pt appears to have been started on IV Lasix at [facility], unsure if pt was still on IV Lasix on discharge, will hold diuretics for now as pt appears clinically euvolemic and resume Lasix PRN  - monitor daily standing weights, strict I/Os  - monitor electrolytes and replete for K>4, Mg>2

## 2023-06-30 NOTE — CHART NOTE - NSCHARTNOTEFT_GEN_A_CORE
Patient with AFIb/Aflutter rhythm with frequent beats of VTach on monitor. 5 beats of VTach noted 23:00 followed by 18 beats of NSVT. 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 STAT EKG performed with no ischemic changes; however with frequent PVCs. Tele strips reviewed and placed in chart. STAT BMP performed and MG and K supplemented to keep K>4 and Mg>2. Additional PO Metoprolol 25 mg x1 dose administered this evening. Will monitor on tele. Patient remains asymptomatic with no complaints at this time.     SABA Stevens  Department of Medicine   In House # [id number] Patient with AFIb/Aflutter rhythm with frequent beats of VTach on monitor. 5 beats of VTach noted followed by 18 beats of NSVT. 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 STAT EKG performed with no ischemic changes; however with frequent PVCs. Tele strips reviewed and placed in chart. STAT BMP performed and MG and K supplemented to keep K>4 and Mg>2. Additional PO Metoprolol 25 mg x1 dose administered this evening. Will monitor on tele. Patient remains asymptomatic with no complaints at this time.     SABA Stevens  Department of Medicine   In House # [id number]

## 2023-06-30 NOTE — PROGRESS NOTE ADULT - PROBLEM SELECTOR PLAN 1
Pt. 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 primary underlying arrythmia   - EKG here showing sinus rhythm w/ frequent PVCs, no acute ST/T-wave changes, HR 98, QTc 454ms  - tele: afib w/ RVR vs. sinus rhythm w/ frequent ectopy  - [id number] score 3    Plan:  - S/p Micra placement 6/29   - c/w metoprolol succinate 25mg qd for rate control  - Cont Eliquis   - monitor on telemetry and vital signs q4h  - monitor electrolytes and replete for K>4, Mg>2

## 2023-06-30 NOTE — PROGRESS NOTE ADULT - SUBJECTIVE AND OBJECTIVE BOX
Patient is a 77y old  Male who presents with a chief complaint of PPM placement with EP (30 Jun 2023 10:29)  Patient denies CP, SOB or palpitations.  Denies R groin wound site pain.  s/p HD this am    PAST MEDICAL & SURGICAL HISTORY:  Urinary tract infection associated with catheterization of urinary tract, unspecified indwelling urinary catheter type, subsequent encounter    Recurrent UTI    CAD (coronary artery disease)    ESRD on dialysis    BPH (benign prostatic hyperplasia)    Atrial fibrillation and flutter    HTN (hypertension)    S/P urological surgery        MEDICATIONS  (STANDING):  albuterol/ipratropium for Nebulization 3 milliLiter(s) Nebulizer every 6 hours  aspirin enteric coated 81 milliGRAM(s) Oral daily  atorvastatin 40 milliGRAM(s) Oral at bedtime  calcium acetate 667 milliGRAM(s) Oral three times a day with meals  chlorhexidine 2% Cloths 1 Application(s) Topical <User Schedule>  ferrous    sulfate 325 milliGRAM(s) Oral daily  metoprolol succinate ER 25 milliGRAM(s) Oral daily  midodrine. 10 milliGRAM(s) Oral <User Schedule>  multivitamin 1 Tablet(s) Oral daily  mupirocin 2% Ointment 1 Application(s) Both Nostrils two times a day  pantoprazole    Tablet 40 milliGRAM(s) Oral before breakfast  senna 2 Tablet(s) Oral at bedtime  tamsulosin 0.4 milliGRAM(s) Oral at bedtime    MEDICATIONS  (PRN):  sodium chloride 0.9% Bolus. 100 milliLiter(s) IV Bolus every 5 minutes PRN SBP LESS THAN or EQUAL to 90 mmHg            Vital Signs Last 24 Hrs  T(C): 36.1 (30 Jun 2023 10:30), Max: 36.8 (30 Jun 2023 01:07)  T(F): 97 (30 Jun 2023 10:30), Max: 98.3 (30 Jun 2023 01:07)  HR: 96 (30 Jun 2023 10:30) (87 - 114)  BP: 106/68 (30 Jun 2023 10:30) (101/59 - 123/63)  BP(mean): --  RR: 18 (30 Jun 2023 10:30) (17 - 18)  SpO2: 99% (30 Jun 2023 10:30) (97% - 99%)    Parameters below as of 30 Jun 2023 10:30  Patient On (Oxygen Delivery Method): room air                INTERPRETATION OF TELEMETRY: AF/AFL with HR in 100-110, PVC's vs aberrancy, V paced at times    ECG:        LABS:                        9.6    6.37  )-----------( 257      ( 30 Jun 2023 05:20 )             29.8     06-30    133<L>  |  95<L>  |  29<H>  ----------------------------<  94  4.2   |  24  |  2.58<H>    Ca    8.8      30 Jun 2023 05:20  Phos  3.7     06-30  Mg     2.20     06-30    TPro  7.4  /  Alb  3.2<L>  /  TBili  0.7  /  DBili  x   /  AST  17  /  ALT  12  /  AlkPhos  121<H>  06-29          Urinalysis Basic - ( 30 Jun 2023 05:20 )    Color: x / Appearance: x / SG: x / pH: x  Gluc: 94 mg/dL / Ketone: x  / Bili: x / Urobili: x   Blood: x / Protein: x / Nitrite: x   Leuk Esterase: x / RBC: x / WBC x   Sq Epi: x / Non Sq Epi: x / Bacteria: x        BNP  RADIOLOGY & ADDITIONAL STUDIES:    Dimensions:    Normal Values:  LA:     3.0    2.0 - 4.0 cm  Ao:     3.1    2.0 - 3.8 cm  SEPTUM: 0.9    0.6 - 1.2 cm  PWT:    1.0    0.6 - 1.1 cm  LVIDd:  5.2    3.0 - 5.6 cm  LVIDs:  3.1    1.8 - 4.0 cm  Derived variables:  LVMI: 90 g/m2  RWT: 0.38  Fractional short: 40 %  EF (Visual Estimate): 55-60 %  Doppler Peak Velocity (m/sec): AoV=1.6  ------------------------------------------------------------------------  ------------------------------------------------------------------------  Conclusions:  1. Normal left ventricular internal dimensions and wall  thicknesses.  2. Overall preserved left ventricular ejection fraction  despite segmental wall motion abnormalities. Endocardial  visualization enhanced with intravenous injection of  Ultrasonic Enhancing Agent (Definity). The basal  inferolateral wall, the basal anterolateral wall, and the  mid anterolateral wall are hypokinetic. The basal inferior  wall, and the mid inferior wall are akinetic.  3. Mild right atrial enlargement.  4. The right ventricle is not well visualized; grossly  Right ventricular enlargement with normal right ventricular  systolic function.  5. Estimated right ventricular systolic pressure equals 35  mm Hg, assuming right atrial pressure equals 8 mm Hg,  consistent with borderline pulmonary hypertension.  6. Normal tricuspid valve. Moderate tricuspid  regurgitation.  *** No previous Echo exam.  ------------------------------------------------------------------------  Confirmed on  11/6/2019 - 22:04:20 by Al Dominguez M.D.      PHYSICAL EXAM:    GENERAL: In no apparent distress,  NECK: Supple and normal thyroid.  No JVD or carotid bruit.  Carotid pulse is 2+ bilaterally.  HEART: S1S2 irregularly irregular ; No murmurs, rubs, or gallops.  L AVF; R chest wall dialysis catheter  PULMONARY: Clear to auscultation and perfusion.  No rales, wheezing, or rhonchi bilaterally.  ABDOMEN: Soft, Nontender, Nondistended; Bowel sounds present  EXTREMITIES:  2+ Peripheral Pulses, No clubbing, cyanosis, or edema; R groin suture removed, tiny blood oozing noted, no hematoma seen  NEUROLOGICAL: alert oriented x4 no focal deficit

## 2023-06-30 NOTE — PROGRESS NOTE ADULT - SUBJECTIVE AND OBJECTIVE BOX
[facility] Division of Kidney Diseases & Hypertension  HEMODIALYSIS NOTE  --------------------------------------------------------------------------------  Chief Complaint: ESRD/Ongoing hemodialysis requirement    24 hour events/subjective: Patient seen and evaluated on dialysis this morning.  AVF functioning well.  UF goal decreased due to hypotension.  Patient asymptomatic without complaints.    PAST HISTORY  --------------------------------------------------------------------------------  No significant changes to PMH, PSH, FHx, SHx, unless otherwise noted    ALLERGIES & MEDICATIONS  --------------------------------------------------------------------------------  Allergies    No Known Allergies    Intolerances      Standing Inpatient Medications  albuterol/ipratropium for Nebulization 3 milliLiter(s) Nebulizer every 6 hours  aspirin enteric coated 81 milliGRAM(s) Oral daily  atorvastatin 40 milliGRAM(s) Oral at bedtime  calcium acetate 667 milliGRAM(s) Oral three times a day with meals  chlorhexidine 2% Cloths 1 Application(s) Topical <User Schedule>  ferrous    sulfate 325 milliGRAM(s) Oral daily  metoprolol succinate ER 25 milliGRAM(s) Oral daily  midodrine. 10 milliGRAM(s) Oral <User Schedule>  multivitamin 1 Tablet(s) Oral daily  mupirocin 2% Ointment 1 Application(s) Both Nostrils two times a day  pantoprazole    Tablet 40 milliGRAM(s) Oral before breakfast  senna 2 Tablet(s) Oral at bedtime  tamsulosin 0.4 milliGRAM(s) Oral at bedtime    PRN Inpatient Medications  sodium chloride 0.9% Bolus. 100 milliLiter(s) IV Bolus every 5 minutes PRN      REVIEW OF SYSTEMS  --------------------------------------------------------------------------------  Gen: no fever  CV: no cp  Pulm: no sob  GI: no complaints  : no complaints  MSK: no complaints    VITALS/PHYSICAL EXAM  --------------------------------------------------------------------------------  T(C): 36 (23 @ 09:42), Max: 36.8 (23 @ 01:07)  HR: 95 (23 @ 09:42) (87 - 114)  BP: 101/63 (23 @ 09:42) (101/59 - 123/63)  RR: 17 (23 @ 09:42) (17 - 18)  SpO2: 98% (23 @ 09:42) (97% - 99%)  Wt(kg): --        23 @ 07:01  -  23 @ 07:00  --------------------------------------------------------  IN: 500 mL / OUT: 200 mL / NET: 300 mL    23 @ 07:01  -  23 @ 10:29  --------------------------------------------------------  IN: 400 mL / OUT: 1200 mL / NET: -800 mL    Physical Exam:  	Gen: no distress  	HEENT: anicteric  	Pulm: CTA B/L  	CV: RRR, S1S2; no rub  	Abd: soft  	: No suprapubic tenderness  	LE: no edema  	Neuro: awake alert oriented conversant  	Psych: Normal affect and mood  	Vascular access: right chest wall tunneled HD catheter.  VIC TODD    LABS/STUDIES  --------------------------------------------------------------------------------              9.6    6.37  >-----------<  257      [23 @ 05:20]              29.8     133  |  95  |  29  ----------------------------<  94      [23 @ 05:20]  4.2   |  24  |  2.58        Ca     8.8     [23 @ 05:20]      Mg     2.20     [23 @ 05:20]      Phos  3.7     [23 @ 05:20]    TPro  7.4  /  Alb  3.2  /  TBili  0.7  /  DBili  x   /  AST  17  /  ALT  12  /  AlkPhos  121  [23 @ 08:30]          Lipid: chol 101, TG 64, HDL 39, LDL --      [23 @ 06:07]    HBsAb <3.0      [23 @ 06:30]  HBsAg Nonreact      [23 @ 06:30]  HCV 0.24, Nonreact      [23 @ 06:30]    Hemodialysis Treatment.:     Schedule: Once, Modality: Hemodialysis, Access: Arteriovenous Fistula    Dialyzer: Revaclear 300, Time: 180 Min    Blood Flow: 400 mL/Min , Dialysate Flow: 500 mL/Min, Dialysate Temp: 35.5, Tubinmm (Adult)    Target Fluid Removal: 1 Liters    Dialysate Electrolytes (mEq/L):  Calcium 2.5, Bicarbonate 35    Potassium Protocol  -->For serum potassium LESS THAN or EQUAL TO 3.4, notify MD/PA/NP       For serum potassium 3.5 - 4, use 3K dialysate bath       For serum potassium 4.1 - 5.9, use 2K dialysate bath       For serum potassium GREATER THAN or EQUAL TO 6, notify MD/PA/NP    Sodium Modeling (mEq/L): Initial 145, Final 140, Last 30 Min, Gradient Linear    Additional Instructions: Keep SBP >90  Remove up to 1L UF  Please give midodrine 30min prior to HD (23 @ 15:44)

## 2023-06-30 NOTE — DIETITIAN INITIAL EVALUATION ADULT - PROBLEM SELECTOR PLAN 5
Hx of HTN. Was on midodrine 10mg TID at [facility] for unclear reasons  - BP on admission 120/70    Plan:  - hold midodrine 10mg TID for now given intermittent bradycardia episodes and stable BPs  - monitor BPs

## 2023-06-30 NOTE — CHART NOTE - NSCHARTNOTEFT_GEN_A_CORE
IR consulted for tunneled dialysis catheter removal.    Tunneled dialysis catheter was successfully removed at bedside in sterile fashion. 1% lidocaine given for local anaesthesia. Pressure held, hemostasis achieved. Dry, sterile dressing applied. Patient tolerated procedure well.    [id number]

## 2023-06-30 NOTE — DIETITIAN INITIAL EVALUATION ADULT - PERTINENT MEDS FT
MEDICATIONS  (STANDING):  albuterol/ipratropium for Nebulization 3 milliLiter(s) Nebulizer every 6 hours  aspirin enteric coated 81 milliGRAM(s) Oral daily  atorvastatin 40 milliGRAM(s) Oral at bedtime  calcium acetate 667 milliGRAM(s) Oral three times a day with meals  chlorhexidine 2% Cloths 1 Application(s) Topical <User Schedule>  ferrous    sulfate 325 milliGRAM(s) Oral daily  metoprolol tartrate 25 milliGRAM(s) Oral two times a day  midodrine. 10 milliGRAM(s) Oral <User Schedule>  multivitamin 1 Tablet(s) Oral daily  mupirocin 2% Ointment 1 Application(s) Both Nostrils two times a day  pantoprazole    Tablet 40 milliGRAM(s) Oral before breakfast  senna 2 Tablet(s) Oral at bedtime  tamsulosin 0.4 milliGRAM(s) Oral at bedtime    MEDICATIONS  (PRN):  sodium chloride 0.9% Bolus. 100 milliLiter(s) IV Bolus every 5 minutes PRN SBP LESS THAN or EQUAL to 90 mmHg

## 2023-06-30 NOTE — DIETITIAN INITIAL EVALUATION ADULT - PROBLEM SELECTOR PLAN 1
Pt. 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 primary underlying arrythmia. Currently on metoprolol succinate 25mg qd for rate control and Eliquis 5mg BID for AC  - initial EKG at [facility] showing rate-controlled aflutter w/ frequent ectopy; subsequent EKG showing 2:1 aflutter w/ PVCs  - EKG here showing sinus rhythm w/ frequent PVCs, no acute ST/T-wave changes, HR 98, QTc 454ms  - tele: afib w/ RVR vs. sinus rhythm w/ frequent ectopy  - [id number] score 3    Plan:  - EP consult to be called in AM for PPM placement  - c/w metoprolol succinate 25mg qd for rate control  - hold Eliquis for now pending PPM placement as above  - monitor on telemetry and vital signs q4h  - monitor electrolytes and replete for K>4, Mg>2

## 2023-06-30 NOTE — DIETITIAN INITIAL EVALUATION ADULT - PROBLEM SELECTOR PLAN 3
- Hx of ESRD on HD (MWF via LUE AVF). SCr 2.57 on admission.   - no indication for urgent HD at this time  - on calcium acetate 667mg TID at home.    Plan:  - nephrology consult to be called in AM for HD  - c/w calcium acetate 667mg TID  - monitor electrolytes, including serum K, HCO3, Ca, and Phos  - avoid nephrotoxins, renally dose medications as per GFR  - monitor strict I/Os, daily weights, UOP, SCr

## 2023-06-30 NOTE — CHART NOTE - NSCHARTNOTEFT_GEN_A_CORE
OK to restart eliquis per IR PA post permacath removal.    Patient noted with 5 beats of NSVT, asymptomatic. EP recommending metoprolol 25mg po bid - orders placed. Continue to monitor tele.

## 2023-06-30 NOTE — DIETITIAN INITIAL EVALUATION ADULT - PERSON TAUGHT/METHOD
food sources high in potassium and sodium and strategies to identify/limit these foods/verbal instruction/patient instructed

## 2023-06-30 NOTE — CHART NOTE - NSCHARTNOTEFT_GEN_A_CORE
PRE-INTERVENTIONAL RADIOLOGY PROCEDURE NOTE    Patient Age: 77  Patient Gender: male   Procedure (including site / side if known): permacath removal   Diagnosis / Indication: esrd now using avf  Interventional Radiology Attending Physician: Dr. Stewart  Ordering Attending Physician: Dr. Hager  Pertinent medical history: end stage renal   Pertinent labs:                       9.6    6.37  )-----------( 257      ( 30 Jun 2023 05:20 )             29.8     Patient and Family aware? Yes     ANNETTE Felix NP

## 2023-06-30 NOTE — DIETITIAN INITIAL EVALUATION ADULT - OTHER INFO
A&Ox4; s/p PPM yesterday. PO intake >75% of meals. No reported GI issues (nausea/vomiting/diarrhea/constipation.) Denies any chewing or swallowing difficulties at this time. NKFA. Reported  pounds; denies recent wt changes.   Pt with minimal knowledge base on therapeutic renal diet.

## 2023-06-30 NOTE — DIETITIAN INITIAL EVALUATION ADULT - PERTINENT LABORATORY DATA
Massage Therapy Progress Note    Visit type: [facility]    Pt was seen and signed contsent in [facility] for chair Massage. Pt stated she is not interested today but will like a follow up next week, stated shes at [facility] on tuesdays  And Fridays     Education/Resources: FYWB What you should know about massage therapy, Provided resources for acupuncture and FYWB What is Aromatherapy?    Plan/Recommendations:  Massage therapy as desired  Will follow up wit pt   Session concluded     06-30    133<L>  |  95<L>  |  29<H>  ----------------------------<  94  4.2   |  24  |  2.58<H>    Ca    8.8      30 Jun 2023 05:20  Phos  3.7     06-30  Mg     2.20     06-30    TPro  7.4  /  Alb  3.2<L>  /  TBili  0.7  /  DBili  x   /  AST  17  /  ALT  12  /  AlkPhos  121<H>  06-29  A1C with Estimated Average Glucose Result: 5.3 % (06-28-23 @ 06:07)

## 2023-06-30 NOTE — PROGRESS NOTE ADULT - PROBLEM SELECTOR PLAN 8
DVT PPx: Eliquis   Diet: renal  Dispo: [facility] w/ HD placement, medically stable for discharge   Code Status: full code

## 2023-07-01 PROCEDURE — 99232 SBSQ HOSP IP/OBS MODERATE 35: CPT

## 2023-07-01 RX ADMIN — Medication 25 MILLIGRAM(S): at 17:07

## 2023-07-01 RX ADMIN — Medication 81 MILLIGRAM(S): at 11:02

## 2023-07-01 RX ADMIN — Medication 325 MILLIGRAM(S): at 11:01

## 2023-07-01 RX ADMIN — CHLORHEXIDINE GLUCONATE 1 APPLICATION(S): 213 SOLUTION TOPICAL at 06:21

## 2023-07-01 RX ADMIN — Medication 667 MILLIGRAM(S): at 08:28

## 2023-07-01 RX ADMIN — MUPIROCIN 1 APPLICATION(S): 20 OINTMENT TOPICAL at 00:32

## 2023-07-01 RX ADMIN — Medication 667 MILLIGRAM(S): at 13:00

## 2023-07-01 RX ADMIN — SENNA PLUS 2 TABLET(S): 8.6 TABLET ORAL at 22:44

## 2023-07-01 RX ADMIN — Medication 3 MILLILITER(S): at 16:32

## 2023-07-01 RX ADMIN — Medication 1 TABLET(S): at 11:01

## 2023-07-01 RX ADMIN — TAMSULOSIN HYDROCHLORIDE 0.4 MILLIGRAM(S): 0.4 CAPSULE ORAL at 22:45

## 2023-07-01 RX ADMIN — Medication 667 MILLIGRAM(S): at 17:07

## 2023-07-01 RX ADMIN — Medication 3 MILLILITER(S): at 11:03

## 2023-07-01 RX ADMIN — Medication 25 MILLIGRAM(S): at 06:20

## 2023-07-01 RX ADMIN — PANTOPRAZOLE SODIUM 40 MILLIGRAM(S): 20 TABLET, DELAYED RELEASE ORAL at 06:20

## 2023-07-01 RX ADMIN — APIXABAN 5 MILLIGRAM(S): 2.5 TABLET, FILM COATED ORAL at 17:07

## 2023-07-01 RX ADMIN — APIXABAN 5 MILLIGRAM(S): 2.5 TABLET, FILM COATED ORAL at 06:20

## 2023-07-01 RX ADMIN — Medication 3 MILLILITER(S): at 03:06

## 2023-07-01 RX ADMIN — MUPIROCIN 1 APPLICATION(S): 20 OINTMENT TOPICAL at 17:07

## 2023-07-01 RX ADMIN — Medication 3 MILLILITER(S): at 21:02

## 2023-07-01 RX ADMIN — ATORVASTATIN CALCIUM 40 MILLIGRAM(S): 80 TABLET, FILM COATED ORAL at 22:44

## 2023-07-01 RX ADMIN — MUPIROCIN 1 APPLICATION(S): 20 OINTMENT TOPICAL at 06:20

## 2023-07-01 NOTE — PROGRESS NOTE ADULT - SUBJECTIVE AND OBJECTIVE BOX
Merlin Mathew, MD   Hospitalist  Pager #[phone]    PROGRESS NOTE:     Patient is a 77y old  Male who presents with a chief complaint of PPM placement with EP (30 Jun 2023 15:21)      SUBJECTIVE / OVERNIGHT EVENTS: NEON   Patient feels fine, has some pain in R knee 2/2 OA   Denies any CP, SOB     ADDITIONAL REVIEW OF SYSTEMS:    MEDICATIONS  (STANDING):  albuterol/ipratropium for Nebulization 3 milliLiter(s) Nebulizer every 6 hours  apixaban 5 milliGRAM(s) Oral two times a day  aspirin enteric coated 81 milliGRAM(s) Oral daily  atorvastatin 40 milliGRAM(s) Oral at bedtime  calcium acetate 667 milliGRAM(s) Oral three times a day with meals  chlorhexidine 2% Cloths 1 Application(s) Topical <User Schedule>  ferrous    sulfate 325 milliGRAM(s) Oral daily  metoprolol tartrate 25 milliGRAM(s) Oral two times a day  midodrine. 10 milliGRAM(s) Oral <User Schedule>  multivitamin 1 Tablet(s) Oral daily  mupirocin 2% Ointment 1 Application(s) Both Nostrils two times a day  pantoprazole    Tablet 40 milliGRAM(s) Oral before breakfast  senna 2 Tablet(s) Oral at bedtime  tamsulosin 0.4 milliGRAM(s) Oral at bedtime    MEDICATIONS  (PRN):  sodium chloride 0.9% Bolus. 100 milliLiter(s) IV Bolus every 5 minutes PRN SBP LESS THAN or EQUAL to 90 mmHg      CAPILLARY BLOOD GLUCOSE        I&O's Summary    30 Jun 2023 07:01  -  01 Jul 2023 07:00  --------------------------------------------------------  IN: 400 mL / OUT: 1500 mL / NET: -1100 mL        PHYSICAL EXAM:  Vital Signs Last 24 Hrs  T(C): 36.6 (01 Jul 2023 13:22), Max: 36.6 (30 Jun 2023 17:25)  T(F): 97.8 (01 Jul 2023 13:22), Max: 97.9 (30 Jun 2023 22:26)  HR: 93 (01 Jul 2023 13:22) (90 - 104)  BP: 100/61 (01 Jul 2023 13:22) (100/61 - 113/69)  BP(mean): --  RR: 18 (01 Jul 2023 13:22) (18 - 18)  SpO2: 98% (01 Jul 2023 13:22) (96% - 100%)    Parameters below as of 01 Jul 2023 13:22  Patient On (Oxygen Delivery Method): room air        CONSTITUTIONAL: NAD, well-developed  RESPIRATORY: Normal respiratory effort; lungs are clear to auscultation bilaterally  CARDIOVASCULAR:, chest wall dressing C/D/I; Regular rate and rhythm, normal S1 and S2, no murmur/rub/gallop; No lower extremity edema; Peripheral pulses are 2+ bilaterally  ABDOMEN: Nontender to palpation, normoactive bowel sounds, no rebound/guarding; No hepatosplenomegaly  MUSCLOSKELETAL: no clubbing or cyanosis of digits; no joint swelling or tenderness to palpation  PSYCH: A+O to person, place, and time; affect appropriate    LABS:                        9.6    6.37  )-----------( 257      ( 30 Jun 2023 05:20 )             29.8     06-30    133<L>  |  95<L>  |  29<H>  ----------------------------<  94  4.2   |  24  |  2.58<H>    Ca    8.8      30 Jun 2023 05:20  Phos  3.7     06-30  Mg     2.20     06-30            Urinalysis Basic - ( 30 Jun 2023 05:20 )    Color: x / Appearance: x / SG: x / pH: x  Gluc: 94 mg/dL / Ketone: x  / Bili: x / Urobili: x   Blood: x / Protein: x / Nitrite: x   Leuk Esterase: x / RBC: x / WBC x   Sq Epi: x / Non Sq Epi: x / Bacteria: x          RADIOLOGY & ADDITIONAL TESTS:  Results Reviewed:   Imaging Personally Reviewed:  Electrocardiogram Personally Reviewed:    COORDINATION OF CARE:  Care Discussed with Consultants/Other Providers [Y/N]:  Prior or Outpatient Records Reviewed [Y/N]:

## 2023-07-01 NOTE — PROGRESS NOTE ADULT - PROBLEM SELECTOR PLAN 7
Hx of recurrent UTI, was started on IV ceftriaxone at [facility] (unclear start date)  - pt. currently denies any urinary symptoms including hesitancy, incontinence, dysuria, frequency, urgency    Plan:  - monitor off antibiotics for now

## 2023-07-01 NOTE — PROGRESS NOTE ADULT - PROBLEM SELECTOR PLAN 1
Pt. 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 primary underlying arrythmia   - EKG here showing sinus rhythm w/ frequent PVCs, no acute ST/T-wave changes, HR 98, QTc 454ms  - tele: afib w/ RVR vs. sinus rhythm w/ frequent ectopy  - [id number] score 3    Plan:  - S/p Micra placement 6/29   - c/w metoprolol succinate 25mg BID for rate control  - Cont Eliquis   - monitor on telemetry and vital signs q4h  - monitor electrolytes and replete for K>4, Mg>2

## 2023-07-02 PROCEDURE — 99232 SBSQ HOSP IP/OBS MODERATE 35: CPT

## 2023-07-02 RX ADMIN — MUPIROCIN 1 APPLICATION(S): 20 OINTMENT TOPICAL at 17:19

## 2023-07-02 RX ADMIN — Medication 667 MILLIGRAM(S): at 17:19

## 2023-07-02 RX ADMIN — APIXABAN 5 MILLIGRAM(S): 2.5 TABLET, FILM COATED ORAL at 17:18

## 2023-07-02 RX ADMIN — Medication 3 MILLILITER(S): at 11:36

## 2023-07-02 RX ADMIN — Medication 325 MILLIGRAM(S): at 13:04

## 2023-07-02 RX ADMIN — Medication 1 TABLET(S): at 13:05

## 2023-07-02 RX ADMIN — MUPIROCIN 1 APPLICATION(S): 20 OINTMENT TOPICAL at 05:16

## 2023-07-02 RX ADMIN — TAMSULOSIN HYDROCHLORIDE 0.4 MILLIGRAM(S): 0.4 CAPSULE ORAL at 22:17

## 2023-07-02 RX ADMIN — Medication 25 MILLIGRAM(S): at 17:19

## 2023-07-02 RX ADMIN — Medication 3 MILLILITER(S): at 04:04

## 2023-07-02 RX ADMIN — Medication 667 MILLIGRAM(S): at 13:04

## 2023-07-02 RX ADMIN — PANTOPRAZOLE SODIUM 40 MILLIGRAM(S): 20 TABLET, DELAYED RELEASE ORAL at 05:13

## 2023-07-02 RX ADMIN — CHLORHEXIDINE GLUCONATE 1 APPLICATION(S): 213 SOLUTION TOPICAL at 05:14

## 2023-07-02 RX ADMIN — Medication 3 MILLILITER(S): at 21:13

## 2023-07-02 RX ADMIN — APIXABAN 5 MILLIGRAM(S): 2.5 TABLET, FILM COATED ORAL at 05:13

## 2023-07-02 RX ADMIN — ATORVASTATIN CALCIUM 40 MILLIGRAM(S): 80 TABLET, FILM COATED ORAL at 22:17

## 2023-07-02 RX ADMIN — Medication 3 MILLILITER(S): at 16:20

## 2023-07-02 RX ADMIN — Medication 81 MILLIGRAM(S): at 13:04

## 2023-07-02 RX ADMIN — Medication 667 MILLIGRAM(S): at 17:11

## 2023-07-02 RX ADMIN — Medication 25 MILLIGRAM(S): at 05:13

## 2023-07-02 RX ADMIN — SENNA PLUS 2 TABLET(S): 8.6 TABLET ORAL at 22:17

## 2023-07-02 NOTE — PROGRESS NOTE ADULT - SUBJECTIVE AND OBJECTIVE BOX
Merlin Mathew, MD   Hospitalist  Pager #[phone]    PROGRESS NOTE:     Patient is a 77y old  Male who presents with a chief complaint of PPM placement with EP (01 Jul 2023 14:58)      SUBJECTIVE / OVERNIGHT EVENTS:  NEON   Patient feels fine, no complaints   Knee pain is improving   Denies CP, SOB     ADDITIONAL REVIEW OF SYSTEMS:    MEDICATIONS  (STANDING):  albuterol/ipratropium for Nebulization 3 milliLiter(s) Nebulizer every 6 hours  apixaban 5 milliGRAM(s) Oral two times a day  aspirin enteric coated 81 milliGRAM(s) Oral daily  atorvastatin 40 milliGRAM(s) Oral at bedtime  calcium acetate 667 milliGRAM(s) Oral three times a day with meals  chlorhexidine 2% Cloths 1 Application(s) Topical <User Schedule>  ferrous    sulfate 325 milliGRAM(s) Oral daily  metoprolol tartrate 25 milliGRAM(s) Oral two times a day  midodrine. 10 milliGRAM(s) Oral <User Schedule>  multivitamin 1 Tablet(s) Oral daily  mupirocin 2% Ointment 1 Application(s) Both Nostrils two times a day  pantoprazole    Tablet 40 milliGRAM(s) Oral before breakfast  senna 2 Tablet(s) Oral at bedtime  tamsulosin 0.4 milliGRAM(s) Oral at bedtime    MEDICATIONS  (PRN):  sodium chloride 0.9% Bolus. 100 milliLiter(s) IV Bolus every 5 minutes PRN SBP LESS THAN or EQUAL to 90 mmHg      CAPILLARY BLOOD GLUCOSE        I&O's Summary      PHYSICAL EXAM:  Vital Signs Last 24 Hrs  T(C): 36.7 (02 Jul 2023 10:10), Max: 36.7 (02 Jul 2023 08:55)  T(F): 98.1 (02 Jul 2023 10:10), Max: 98.1 (02 Jul 2023 10:10)  HR: 107 (02 Jul 2023 10:10) (88 - 115)  BP: 121/51 (02 Jul 2023 10:10) (118/62 - 133/68)  BP(mean): --  RR: 16 (02 Jul 2023 10:10) (16 - 18)  SpO2: 98% (02 Jul 2023 10:10) (97% - 100%)    Parameters below as of 02 Jul 2023 10:10  Patient On (Oxygen Delivery Method): room air        CONSTITUTIONAL: NAD, well-developed elderly male   RESPIRATORY: Normal respiratory effort; lungs are clear to auscultation bilaterally  CARDIOVASCULAR: Chest wall dressing C/D/I Regular rate and rhythm, normal S1 and S2, no murmur/rub/gallop; No lower extremity edema; Peripheral pulses are 2+ bilaterally  ABDOMEN: Nontender to palpation, normoactive bowel sounds, no rebound/guarding; No hepatosplenomegaly  MUSCULOSKELETAL no clubbing or cyanosis of digits; no joint swelling or tenderness to palpation  PSYCH: A+O to person, place, and time; affect appropriate    LABS:                      RADIOLOGY & ADDITIONAL TESTS:  Results Reviewed:   Imaging Personally Reviewed:  Electrocardiogram Personally Reviewed:    COORDINATION OF CARE:  Care Discussed with Consultants/Other Providers [Y/N]:  Prior or Outpatient Records Reviewed [Y/N]:

## 2023-07-02 NOTE — PROGRESS NOTE ADULT - PROBLEM SELECTOR PLAN 7
Hx of recurrent UTI, was started on IV ceftriaxone at [facility] (unclear start date)  - pt. currently denies any urinary symptoms including hesitancy, incontinence, dysuria, frequency, urgency    Plan:  - monitor off antibiotics for now

## 2023-07-02 NOTE — PROGRESS NOTE ADULT - PROBLEM SELECTOR PLAN 1
Pt. 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 primary underlying arrythmia   - EKG here showing sinus rhythm w/ frequent PVCs, no acute ST/T-wave changes, HR 98, QTc 454ms  - tele: afib w/ RVR vs. sinus rhythm w/ frequent ectopy  - [id number] score 3    Plan:  - S/p Micra placement 6/29   - c/w metoprolol succinate 25mg BID for rate control  - Cont Eliquis   - monitor on telemetry and vital signs q4h  - monitor electrolytes and replete for K>4, Mg>2

## 2023-07-03 DIAGNOSIS — I47.29 OTHER VENTRICULAR TACHYCARDIA: ICD-10-CM

## 2023-07-03 DIAGNOSIS — N18.6 END STAGE RENAL DISEASE: ICD-10-CM

## 2023-07-03 LAB
ANION GAP SERPL CALC-SCNC: 13 MMOL/L — SIGNIFICANT CHANGE UP (ref 7–14)
BUN SERPL-MCNC: 36 MG/DL — HIGH (ref 7–23)
CALCIUM SERPL-MCNC: 9.1 MG/DL — SIGNIFICANT CHANGE UP (ref 8.4–10.5)
CHLORIDE SERPL-SCNC: 96 MMOL/L — LOW (ref 98–107)
CO2 SERPL-SCNC: 25 MMOL/L — SIGNIFICANT CHANGE UP (ref 22–31)
CREAT SERPL-MCNC: 3.14 MG/DL — HIGH (ref 0.5–1.3)
EGFR: 20 ML/MIN/1.73M2 — LOW
GLUCOSE SERPL-MCNC: 104 MG/DL — HIGH (ref 70–99)
HCT VFR BLD CALC: 28 % — LOW (ref 39–50)
HGB BLD-MCNC: 9 G/DL — LOW (ref 13–17)
MAGNESIUM SERPL-MCNC: 1.8 MG/DL — SIGNIFICANT CHANGE UP (ref 1.6–2.6)
MCHC RBC-ENTMCNC: 31.7 PG — SIGNIFICANT CHANGE UP (ref 27–34)
MCHC RBC-ENTMCNC: 32.1 GM/DL — SIGNIFICANT CHANGE UP (ref 32–36)
MCV RBC AUTO: 98.6 FL — SIGNIFICANT CHANGE UP (ref 80–100)
NRBC # BLD: 0 /100 WBCS — SIGNIFICANT CHANGE UP (ref 0–0)
NRBC # FLD: 0 K/UL — SIGNIFICANT CHANGE UP (ref 0–0)
PHOSPHATE SERPL-MCNC: 3.5 MG/DL — SIGNIFICANT CHANGE UP (ref 2.5–4.5)
PLATELET # BLD AUTO: 247 K/UL — SIGNIFICANT CHANGE UP (ref 150–400)
POTASSIUM SERPL-MCNC: 3.9 MMOL/L — SIGNIFICANT CHANGE UP (ref 3.5–5.3)
POTASSIUM SERPL-SCNC: 3.9 MMOL/L — SIGNIFICANT CHANGE UP (ref 3.5–5.3)
RBC # BLD: 2.84 M/UL — LOW (ref 4.2–5.8)
RBC # FLD: 13.8 % — SIGNIFICANT CHANGE UP (ref 10.3–14.5)
SODIUM SERPL-SCNC: 134 MMOL/L — LOW (ref 135–145)
WBC # BLD: 6.56 K/UL — SIGNIFICANT CHANGE UP (ref 3.8–10.5)
WBC # FLD AUTO: 6.56 K/UL — SIGNIFICANT CHANGE UP (ref 3.8–10.5)

## 2023-07-03 PROCEDURE — 90935 HEMODIALYSIS ONE EVALUATION: CPT

## 2023-07-03 PROCEDURE — 99232 SBSQ HOSP IP/OBS MODERATE 35: CPT

## 2023-07-03 PROCEDURE — 71045 X-RAY EXAM CHEST 1 VIEW: CPT | Mod: 26

## 2023-07-03 RX ORDER — METOPROLOL TARTRATE 50 MG
25 TABLET ORAL ONCE
Refills: 0 | Status: COMPLETED | OUTPATIENT
Start: 2023-07-03 | End: 2023-07-03

## 2023-07-03 RX ADMIN — Medication 667 MILLIGRAM(S): at 11:54

## 2023-07-03 RX ADMIN — PANTOPRAZOLE SODIUM 40 MILLIGRAM(S): 20 TABLET, DELAYED RELEASE ORAL at 05:30

## 2023-07-03 RX ADMIN — Medication 3 MILLILITER(S): at 15:40

## 2023-07-03 RX ADMIN — APIXABAN 5 MILLIGRAM(S): 2.5 TABLET, FILM COATED ORAL at 05:30

## 2023-07-03 RX ADMIN — Medication 667 MILLIGRAM(S): at 18:35

## 2023-07-03 RX ADMIN — Medication 3 MILLILITER(S): at 21:12

## 2023-07-03 RX ADMIN — MUPIROCIN 1 APPLICATION(S): 20 OINTMENT TOPICAL at 05:33

## 2023-07-03 RX ADMIN — Medication 25 MILLIGRAM(S): at 15:07

## 2023-07-03 RX ADMIN — TAMSULOSIN HYDROCHLORIDE 0.4 MILLIGRAM(S): 0.4 CAPSULE ORAL at 21:16

## 2023-07-03 RX ADMIN — CHLORHEXIDINE GLUCONATE 1 APPLICATION(S): 213 SOLUTION TOPICAL at 05:34

## 2023-07-03 RX ADMIN — APIXABAN 5 MILLIGRAM(S): 2.5 TABLET, FILM COATED ORAL at 18:35

## 2023-07-03 RX ADMIN — ATORVASTATIN CALCIUM 40 MILLIGRAM(S): 80 TABLET, FILM COATED ORAL at 21:17

## 2023-07-03 RX ADMIN — Medication 3 MILLILITER(S): at 03:57

## 2023-07-03 RX ADMIN — Medication 81 MILLIGRAM(S): at 11:54

## 2023-07-03 RX ADMIN — Medication 1 TABLET(S): at 11:54

## 2023-07-03 RX ADMIN — Medication 325 MILLIGRAM(S): at 11:53

## 2023-07-03 RX ADMIN — SENNA PLUS 2 TABLET(S): 8.6 TABLET ORAL at 21:17

## 2023-07-03 RX ADMIN — MIDODRINE HYDROCHLORIDE 10 MILLIGRAM(S): 2.5 TABLET ORAL at 05:30

## 2023-07-03 NOTE — PROVIDER CONTACT NOTE (OTHER) - DATE AND TIME:
02-Jul-2023 10:24
29-Jun-2023 21:07
02-Jul-2023 21:51
30-Jun-2023 13:24
28-Jun-2023 02:40
03-Jul-2023 07:58
30-Jun-2023 00:00

## 2023-07-03 NOTE — PROGRESS NOTE ADULT - PROBLEM SELECTOR PLAN 8
DVT PPx: Eliquis   Diet: renal  Dispo: [facility] w/ HD placement, medically stable for discharge   Code Status: full code    DC time 36 minutes Hx of recurrent UTI, was started on IV ceftriaxone at [facility] (unclear start date)  - pt. currently denies any urinary symptoms including hesitancy, incontinence, dysuria, frequency, urgency    Plan:  - monitor off antibiotics for now

## 2023-07-03 NOTE — PROGRESS NOTE ADULT - PROBLEM SELECTOR PLAN 1
Pt. w/ hx of pAF and aflutter, transferred from [facility] for PPM placement for tachy-shy syndrome  - EKG here showing sinus rhythm w/ frequent PVCs, no acute ST/T-wave changes, HR 98, QTc 454ms  - tele: afib w/ RVR vs. sinus rhythm w/ frequent ectopy  - [id number] score 3    Plan:  - S/p Micra placement 6/29   - c/w metoprolol succinate 25mg BID for rate control  - Cont Eliquis   - monitor on telemetry   - monitor electrolytes and replete for K>4, Mg>2

## 2023-07-03 NOTE — PROVIDER CONTACT NOTE (OTHER) - REASON
[address] 3 beats of vtach
Patient had 18 beats vtach
Pt had 5 beats of v-tach
patient throwing beats of vtach
patient heart rate went up to 190 when doing leg exercises in bed
/58
Patient in rapid Aflutter and with frequent vtach

## 2023-07-03 NOTE — PROGRESS NOTE ADULT - PROBLEM SELECTOR PLAN 7
Hx of recurrent UTI, was started on IV ceftriaxone at [facility] (unclear start date)  - pt. currently denies any urinary symptoms including hesitancy, incontinence, dysuria, frequency, urgency    Plan:  - monitor off antibiotics for now Hx of BPH w/ frequently UTIs and urosepsis. On tamsulosin 0.4mg qhs at home    Plan:  - c/w tamsulosin 0.4mg qhs

## 2023-07-03 NOTE — PROGRESS NOTE ADULT - SUBJECTIVE AND OBJECTIVE BOX
Called back by primary team to reevaluate patient for NSVT during hemodialysis. Patient had up to 25 beats NSVT in the setting of BB being held and HD. Patient denies any chest pain, SOB, palpitations, dizziness, syncope or near syncope during the episode      PAST MEDICAL & SURGICAL HISTORY:  Urinary tract infection associated with catheterization of urinary tract, unspecified indwelling urinary catheter type, subsequent encounter    Recurrent UTI    CAD (coronary artery disease)    ESRD on dialysis    BPH (benign prostatic hyperplasia)    Atrial fibrillation and flutter    HTN (hypertension)    S/P urological surgery        MEDICATIONS  (STANDING):  albuterol/ipratropium for Nebulization 3 milliLiter(s) Nebulizer every 6 hours  apixaban 5 milliGRAM(s) Oral two times a day  aspirin enteric coated 81 milliGRAM(s) Oral daily  atorvastatin 40 milliGRAM(s) Oral at bedtime  calcium acetate 667 milliGRAM(s) Oral three times a day with meals  chlorhexidine 2% Cloths 1 Application(s) Topical <User Schedule>  ferrous    sulfate 325 milliGRAM(s) Oral daily  metoprolol tartrate 25 milliGRAM(s) Oral two times a day  midodrine. 10 milliGRAM(s) Oral <User Schedule>  multivitamin 1 Tablet(s) Oral daily  pantoprazole    Tablet 40 milliGRAM(s) Oral before breakfast  senna 2 Tablet(s) Oral at bedtime  tamsulosin 0.4 milliGRAM(s) Oral at bedtime    MEDICATIONS  (PRN):  sodium chloride 0.9% Bolus. 100 milliLiter(s) IV Bolus every 5 minutes PRN SBP LESS THAN or EQUAL to 90 mmHg      Vital Signs Last 24 Hrs  T(C): 37.2 (03 Jul 2023 09:55), Max: 37.2 (03 Jul 2023 09:55)  T(F): 98.9 (03 Jul 2023 09:55), Max: 98.9 (03 Jul 2023 09:55)  HR: 84 (03 Jul 2023 09:55) (68 - 98)  BP: 110/66 (03 Jul 2023 09:55) (101/65 - 114/62)  BP(mean): --  RR: 16 (03 Jul 2023 09:55) (16 - 18)  SpO2: 100% (03 Jul 2023 05:03) (98% - 100%)    Parameters below as of 03 Jul 2023 09:55  Patient On (Oxygen Delivery Method): room air      INTERPRETATION OF TELEMETRY: Atrial fibrillation with VR 80s-110s, occ. up to 150s during HD, NSVT    LABS:                        9.0    6.56  )-----------( 247      ( 03 Jul 2023 07:03 )             28.0     07-03    134<L>  |  96<L>  |  36<H>  ----------------------------<  104<H>  3.9   |  25  |  3.14<H>    Ca    9.1      03 Jul 2023 07:03  Phos  3.5     07-03  Mg     1.80     07-03            Urinalysis Basic - ( 03 Jul 2023 07:03 )    Color: x / Appearance: x / SG: x / pH: x  Gluc: 104 mg/dL / Ketone: x  / Bili: x / Urobili: x   Blood: x / Protein: x / Nitrite: x   Leuk Esterase: x / RBC: x / WBC x   Sq Epi: x / Non Sq Epi: x / Bacteria: x      I&O's Summary    02 Jul 2023 07:01  -  03 Jul 2023 07:00  --------------------------------------------------------  IN: 320 mL / OUT: 200 mL / NET: 120 mL    03 Jul 2023 07:01  -  03 Jul 2023 10:31  --------------------------------------------------------  IN: 400 mL / OUT: 1400 mL / NET: -1000 mL      PHYSICAL EXAM:    GENERAL: In no apparent distress, well nourished, and hydrated.  HEART: Irregular rate and rhythm; No murmurs, rubs, or gallops.  PULMONARY: Clear to auscultation and percussion.  No rales, wheezing, or rhonchi bilaterally.  ABDOMEN: Soft, Nontender, Nondistended; Bowel sounds present  EXTREMITIES:  2+ Peripheral Pulses, No clubbing, cyanosis, or edema

## 2023-07-03 NOTE — PROGRESS NOTE ADULT - PROBLEM SELECTOR PLAN 1
Pt. tolerating HD. Pt. with chronic hypotension, on oral Midodrine therapy. BP low but stable during HD rounds today. AVF functioning well. Pt. with hyperphosphatemia, on oral calcium acetate therapy. Monitor BP and labs.

## 2023-07-03 NOTE — PROGRESS NOTE ADULT - PROBLEM SELECTOR PLAN 6
Hx of BPH w/ frequently UTIs and urosepsis. On tamsulosin 0.4mg qhs at home    Plan:  - c/w tamsulosin 0.4mg qhs - continue beta-blocker   - monitor electrolytes and replete for K>4, Mg>2  - telemetry monitoring

## 2023-07-03 NOTE — PROGRESS NOTE ADULT - NS ATTEND AMEND GEN_ALL_CORE FT
77 year old man with a pmh of HFpEF (EF 50% 5/2023), CAD s/p NSTEMI 2/2 cocaine use (2019), pAF/aflutter (on Eliquis), ESRD on HD (MWF via LUE AVF), HTN, BPH, and recurrent UTI who was presented to [facility] for SOB on 6/19/2023 and found to have Aflutter/atrial fibrillation with tachybradycardia syndrome. Now transferred to [facility] and s/p  s/p [employer] Micra AV pacemaker on 6/29. Had asymptomatic NSVT up to 25 beats during HD today. Patient didn't receive his am dose of metoprolol. TTE ([facility], 5/2023): EF 50%,   -Continue Eliquis 5mg po BID for thromboembolic ppx  given that patient has a [id number] score of 3.   -Continue metoprolol 25mg bid, can uptitrate if BP tolerates, give dose now  -Follow up with EP device clinic on  7/13 at 2:20pm  -No further EP intervention, EP to sign off.
This is a 77 year old man with a pmh of HFpEF (EF 50% 5/2023), CAD s/p NSTEMI 2/2 cocaine use (2019), pAF/aflutter (on Eliquis), ESRD on HD (MWF via LUE AVF), HTN, BPH, and recurrent UTI who was presented to [facility] for SOB on 6/19/2023 and found to have Aflutter with tacy-bradycardia syndrome and monomorphic VT to 180s-200s that lasted 1 minute per documentation. Now transferred to [facility] for Aflutter ablation with possible PPM placement. TTE ([facility], 5/2023): EF 50%, no WMA, mildly increased LV wall thickness, dec RVSF, RV volume overload, mod dilated RA, mild AS, mild MS, mod-severe TR. Continue metoprolol 25mg qd. Continue Eliquis 5mg po BID for thromboembolic ppx  given that patient has a [id number] score of 3. Plan for possible PPM (possible leadless PM) today. Discussed with patient regarding risks/benefits and alternatives of PPM placement. Risks including and not limited to infection, bleeding, pericardial effusion, cardiac tamponade and death. All questions answered. Patient is alert, oriented x3. He consented for the procedure.

## 2023-07-03 NOTE — PROVIDER CONTACT NOTE (OTHER) - RECOMMENDATIONS
ACP notified
none
stat dose metoprolol
Metoprolol
Will continue to monitor.
ACP aware. Pt due for lopressor 25mg PO at 6pm

## 2023-07-03 NOTE — PROVIDER CONTACT NOTE (OTHER) - ACTION/TREATMENT ORDERED:
BOBY Scott made aware.  Will continue to monitor HR on tele.
Pt due for lopressor 25mg PO at 6pm
metoprolol ordered
BOBY Scott made aware.  ACP Sonia ordered 1x dose of metoprolol, magnesium, and k runs
ACP notified
provider aware, heart rate has decreased
provider made aware and delegated RN to continue to monitor patient.

## 2023-07-03 NOTE — PROGRESS NOTE ADULT - SUBJECTIVE AND OBJECTIVE BOX
[facility] DIVISION OF KIDNEY DISEASES AND HYPERTENSION --   --------------------------------------------------------------------------------  Chief Complaint: ESRD/Ongoing hemodialysis requirement    24 hour events/subjective: Pt. seen and examined during HD today. Pt. feels well. No fever, CP, palpitations, SOB, HA or dizziness during HD rounds.    PAST HISTORY  --------------------------------------------------------------------------------  No significant changes to PMH, PSH, FHx, SHx, unless otherwise noted    ALLERGIES & MEDICATIONS  --------------------------------------------------------------------------------  Allergies    No Known Allergies    Intolerances    Standing Inpatient Medications  albuterol/ipratropium for Nebulization 3 milliLiter(s) Nebulizer every 6 hours  apixaban 5 milliGRAM(s) Oral two times a day  aspirin enteric coated 81 milliGRAM(s) Oral daily  atorvastatin 40 milliGRAM(s) Oral at bedtime  calcium acetate 667 milliGRAM(s) Oral three times a day with meals  chlorhexidine 2% Cloths 1 Application(s) Topical <User Schedule>  ferrous    sulfate 325 milliGRAM(s) Oral daily  metoprolol tartrate 25 milliGRAM(s) Oral two times a day  midodrine. 10 milliGRAM(s) Oral <User Schedule>  multivitamin 1 Tablet(s) Oral daily  pantoprazole    Tablet 40 milliGRAM(s) Oral before breakfast  senna 2 Tablet(s) Oral at bedtime  tamsulosin 0.4 milliGRAM(s) Oral at bedtime    PRN Inpatient Medications  sodium chloride 0.9% Bolus. 100 milliLiter(s) IV Bolus every 5 minutes PRN    REVIEW OF SYSTEMS  --------------------------------------------------------------------------------  Gen: No fever  Respiratory: No dyspnea  CV: No chest pain, no palpitations  GI: No abdominal pain  MSK: No edema  Neuro: No dizziness    VITALS/PHYSICAL EXAM  --------------------------------------------------------------------------------  T(C): 37.2 (07-03-23 @ 09:55), Max: 37.2 (07-03-23 @ 09:55)  HR: 84 (07-03-23 @ 09:55) (68 - 98)  BP: 110/66 (07-03-23 @ 09:55) (101/65 - 114/62)  RR: 16 (07-03-23 @ 09:55) (16 - 18)  SpO2: 100% (07-03-23 @ 05:03) (98% - 100%)  Wt(kg): --    07-02-23 @ 07:01  -  07-03-23 @ 07:00  --------------------------------------------------------  IN: 320 mL / OUT: 200 mL / NET: 120 mL    07-03-23 @ 07:01  -  07-03-23 @ 10:16  --------------------------------------------------------  IN: 400 mL / OUT: 1400 mL / NET: -1000 mL    Physical Exam:  	Gen: resting, NAD  	HEENT: No JVD  	Pulm: CTA B/L  	CV: S1S2+  	Abd: Soft  	LE: No edema  	Vascular access: LUE AVF being used for HD    LABS/STUDIES  --------------------------------------------------------------------------------              9.0    6.56  >-----------<  247      [07-03-23 @ 07:03]              28.0     134  |  96  |  36  ----------------------------<  104      [07-03-23 @ 07:03]  3.9   |  25  |  3.14        Ca     9.1     [07-03-23 @ 07:03]      Mg     1.80     [07-03-23 @ 07:03]      Phos  3.5     [07-03-23 @ 07:03]    HBsAb <3.0      [06-29-23 @ 06:30]  HBsAg Nonreact      [06-30-23 @ 05:20]  HCV 0.16, Nonreact      [06-30-23 @ 05:20]

## 2023-07-03 NOTE — PROVIDER CONTACT NOTE (OTHER) - BACKGROUND
patient has a hx of afib with hr up to 160s
Admit Diagnosis) Heart disease  (PMH) HTN (hypertension)  (PMH) Atrial fibrillation and flutter  (PMH) BPH (benign prostatic hyperplasia)
patient has had these episodes previously
(Admit Diagnosis) Heart disease  (PMH) HTN (hypertension)  (PMH) Atrial fibrillation and flutter
Admit Diagnosis) Heart disease  (PMH) HTN (hypertension)  (PMH) Atrial fibrillation and flutter  (PMH) BPH (benign prostatic hyperplasia)
transferred from [facility]
(Admit Diagnosis) Heart disease  (PMH) HTN (hypertension)  (PMH) Atrial fibrillation and flutter

## 2023-07-03 NOTE — PROVIDER CONTACT NOTE (OTHER) - ASSESSMENT
Patient A&Ox4, denies chest pain, shortness of breath, discomfort at this time.  Patient asymptomatic.  VSS.
VS /58, HR 94, RR 17, Temp 97.9 oral, SpO2 99% RA. Pt is resting comfortably in bed, asymptomatic. Safety maintained.
patient returned to unit still tachy rn paged provider and messaged thru teams and escalated to nurse manager. metoprolol not able to be given because bp below perimeter initially given late
no acute changes noted patient currently resting in bed comfortably, patient stated he was just doing leg exercises
see flow sheet attached
Pt asymptomatic. Denies chest pain, SOB, or generalized pain at this time.
Patient A&Ox4, denies chest pain or shortness of breath.  Patient asymptomatic, VSS as documented.

## 2023-07-03 NOTE — PROGRESS NOTE ADULT - SUBJECTIVE AND OBJECTIVE BOX
PROGRESS NOTE:     Patient is a 77y old  Male who presents with a chief complaint of PPM placement with EP (03 Jul 2023 10:30)      SUBJECTIVE / OVERNIGHT EVENTS: No new complaints.     ADDITIONAL REVIEW OF SYSTEMS:    MEDICATIONS  (STANDING):  albuterol/ipratropium for Nebulization 3 milliLiter(s) Nebulizer every 6 hours  apixaban 5 milliGRAM(s) Oral two times a day  aspirin enteric coated 81 milliGRAM(s) Oral daily  atorvastatin 40 milliGRAM(s) Oral at bedtime  calcium acetate 667 milliGRAM(s) Oral three times a day with meals  chlorhexidine 2% Cloths 1 Application(s) Topical <User Schedule>  ferrous    sulfate 325 milliGRAM(s) Oral daily  metoprolol tartrate 25 milliGRAM(s) Oral two times a day  metoprolol tartrate 25 milliGRAM(s) Oral once  midodrine. 10 milliGRAM(s) Oral <User Schedule>  multivitamin 1 Tablet(s) Oral daily  pantoprazole    Tablet 40 milliGRAM(s) Oral before breakfast  senna 2 Tablet(s) Oral at bedtime  tamsulosin 0.4 milliGRAM(s) Oral at bedtime    MEDICATIONS  (PRN):  sodium chloride 0.9% Bolus. 100 milliLiter(s) IV Bolus every 5 minutes PRN SBP LESS THAN or EQUAL to 90 mmHg      CAPILLARY BLOOD GLUCOSE        I&O's Summary    02 Jul 2023 07:01  -  03 Jul 2023 07:00  --------------------------------------------------------  IN: 320 mL / OUT: 200 mL / NET: 120 mL    03 Jul 2023 07:01  -  03 Jul 2023 11:55  --------------------------------------------------------  IN: 400 mL / OUT: 1400 mL / NET: -1000 mL        PHYSICAL EXAM:  Vital Signs Last 24 Hrs  T(C): 36.3 (03 Jul 2023 10:49), Max: 37.2 (03 Jul 2023 09:55)  T(F): 97.3 (03 Jul 2023 10:49), Max: 98.9 (03 Jul 2023 09:55)  HR: 100 (03 Jul 2023 10:49) (68 - 100)  BP: 92/40 (03 Jul 2023 10:49) (92/40 - 114/62)  BP(mean): --  RR: 17 (03 Jul 2023 10:49) (16 - 18)  SpO2: 96% (03 Jul 2023 10:49) (96% - 100%)    Parameters below as of 03 Jul 2023 09:55  Patient On (Oxygen Delivery Method): room air      CONSTITUTIONAL: NAD  RESPIRATORY: Normal respiratory effort; lungs are clear to auscultation bilaterally  CARDIOVASCULAR: Chest wall dressing C/D/I Regular rate and rhythm, normal S1 and S2, no murmur/rub/gallop; No lower extremity edema; Peripheral pulses are 2+ bilaterally  ABDOMEN: Nontender to palpation, normoactive bowel sounds, no rebound/guarding; No hepatosplenomegaly  MUSCULOSKELETAL no clubbing or cyanosis of digits; no joint swelling or tenderness to palpation  PSYCH: A+O to person, place, and time; affect appropriate    LABS:                        9.0    6.56  )-----------( 247      ( 03 Jul 2023 07:03 )             28.0     07-03    134<L>  |  96<L>  |  36<H>  ----------------------------<  104<H>  3.9   |  25  |  3.14<H>    Ca    9.1      03 Jul 2023 07:03  Phos  3.5     07-03  Mg     1.80     07-03            Urinalysis Basic - ( 03 Jul 2023 07:03 )    Color: x / Appearance: x / SG: x / pH: x  Gluc: 104 mg/dL / Ketone: x  / Bili: x / Urobili: x   Blood: x / Protein: x / Nitrite: x   Leuk Esterase: x / RBC: x / WBC x   Sq Epi: x / Non Sq Epi: x / Bacteria: x          RADIOLOGY & ADDITIONAL TESTS:  Results Reviewed:   Imaging Personally Reviewed:  Electrocardiogram Personally Reviewed:    COORDINATION OF CARE:  Care Discussed with Consultants/Other Providers [Y/N]:  Prior or Outpatient Records Reviewed [Y/N]:

## 2023-07-03 NOTE — PROVIDER CONTACT NOTE (OTHER) - SITUATION
Pt had 5 beats of v-tach
patient heart rate went up to 190 when doing leg exercises in bed
Patient in rapid aflutter with frequent vtach
patient has a hx of afib- throwing multiple beats of vtach while in dialysis, dialysis unit called and dialysis RN stated patient asymptomatic currently being dialyzed.
Patient had 18 beats vtach
/58, asymptomatic
[address] 3 beats of vtach

## 2023-07-04 LAB
ANION GAP SERPL CALC-SCNC: 12 MMOL/L — SIGNIFICANT CHANGE UP (ref 7–14)
BUN SERPL-MCNC: 25 MG/DL — HIGH (ref 7–23)
CALCIUM SERPL-MCNC: 9.6 MG/DL — SIGNIFICANT CHANGE UP (ref 8.4–10.5)
CHLORIDE SERPL-SCNC: 99 MMOL/L — SIGNIFICANT CHANGE UP (ref 98–107)
CO2 SERPL-SCNC: 29 MMOL/L — SIGNIFICANT CHANGE UP (ref 22–31)
CREAT SERPL-MCNC: 2.49 MG/DL — HIGH (ref 0.5–1.3)
EGFR: 26 ML/MIN/1.73M2 — LOW
GLUCOSE SERPL-MCNC: 98 MG/DL — SIGNIFICANT CHANGE UP (ref 70–99)
HCT VFR BLD CALC: 29.9 % — LOW (ref 39–50)
HGB BLD-MCNC: 9.5 G/DL — LOW (ref 13–17)
MAGNESIUM SERPL-MCNC: 1.9 MG/DL — SIGNIFICANT CHANGE UP (ref 1.6–2.6)
MCHC RBC-ENTMCNC: 31.3 PG — SIGNIFICANT CHANGE UP (ref 27–34)
MCHC RBC-ENTMCNC: 31.8 GM/DL — LOW (ref 32–36)
MCV RBC AUTO: 98.4 FL — SIGNIFICANT CHANGE UP (ref 80–100)
NRBC # BLD: 0 /100 WBCS — SIGNIFICANT CHANGE UP (ref 0–0)
NRBC # FLD: 0 K/UL — SIGNIFICANT CHANGE UP (ref 0–0)
PHOSPHATE SERPL-MCNC: 2.8 MG/DL — SIGNIFICANT CHANGE UP (ref 2.5–4.5)
PLATELET # BLD AUTO: 260 K/UL — SIGNIFICANT CHANGE UP (ref 150–400)
POTASSIUM SERPL-MCNC: 4.1 MMOL/L — SIGNIFICANT CHANGE UP (ref 3.5–5.3)
POTASSIUM SERPL-SCNC: 4.1 MMOL/L — SIGNIFICANT CHANGE UP (ref 3.5–5.3)
RBC # BLD: 3.04 M/UL — LOW (ref 4.2–5.8)
RBC # FLD: 13.8 % — SIGNIFICANT CHANGE UP (ref 10.3–14.5)
SARS-COV-2 RNA SPEC QL NAA+PROBE: SIGNIFICANT CHANGE UP
SODIUM SERPL-SCNC: 140 MMOL/L — SIGNIFICANT CHANGE UP (ref 135–145)
WBC # BLD: 6.73 K/UL — SIGNIFICANT CHANGE UP (ref 3.8–10.5)
WBC # FLD AUTO: 6.73 K/UL — SIGNIFICANT CHANGE UP (ref 3.8–10.5)

## 2023-07-04 PROCEDURE — 99232 SBSQ HOSP IP/OBS MODERATE 35: CPT

## 2023-07-04 RX ADMIN — APIXABAN 5 MILLIGRAM(S): 2.5 TABLET, FILM COATED ORAL at 06:22

## 2023-07-04 RX ADMIN — TAMSULOSIN HYDROCHLORIDE 0.4 MILLIGRAM(S): 0.4 CAPSULE ORAL at 22:22

## 2023-07-04 RX ADMIN — SENNA PLUS 2 TABLET(S): 8.6 TABLET ORAL at 22:22

## 2023-07-04 RX ADMIN — ATORVASTATIN CALCIUM 40 MILLIGRAM(S): 80 TABLET, FILM COATED ORAL at 22:22

## 2023-07-04 RX ADMIN — Medication 3 MILLILITER(S): at 03:41

## 2023-07-04 RX ADMIN — APIXABAN 5 MILLIGRAM(S): 2.5 TABLET, FILM COATED ORAL at 17:39

## 2023-07-04 RX ADMIN — Medication 325 MILLIGRAM(S): at 12:25

## 2023-07-04 RX ADMIN — Medication 1 TABLET(S): at 12:25

## 2023-07-04 RX ADMIN — Medication 667 MILLIGRAM(S): at 17:39

## 2023-07-04 RX ADMIN — Medication 3 MILLILITER(S): at 09:55

## 2023-07-04 RX ADMIN — Medication 25 MILLIGRAM(S): at 06:22

## 2023-07-04 RX ADMIN — Medication 25 MILLIGRAM(S): at 17:40

## 2023-07-04 RX ADMIN — CHLORHEXIDINE GLUCONATE 1 APPLICATION(S): 213 SOLUTION TOPICAL at 05:30

## 2023-07-04 RX ADMIN — Medication 81 MILLIGRAM(S): at 12:25

## 2023-07-04 RX ADMIN — Medication 3 MILLILITER(S): at 15:47

## 2023-07-04 RX ADMIN — Medication 3 MILLILITER(S): at 21:33

## 2023-07-04 RX ADMIN — Medication 667 MILLIGRAM(S): at 09:11

## 2023-07-04 RX ADMIN — PANTOPRAZOLE SODIUM 40 MILLIGRAM(S): 20 TABLET, DELAYED RELEASE ORAL at 06:22

## 2023-07-04 RX ADMIN — Medication 667 MILLIGRAM(S): at 12:25

## 2023-07-04 NOTE — PROGRESS NOTE ADULT - PROBLEM SELECTOR PLAN 7
Hx of BPH w/ frequently UTIs and urosepsis. On tamsulosin 0.4mg qhs at home    Plan:  - c/w tamsulosin 0.4mg qhs

## 2023-07-04 NOTE — PROGRESS NOTE ADULT - PROBLEM SELECTOR PLAN 1
Pt. w/ hx of pAF and aflutter, transferred from [facility] for PPM placement for tachy-shy syndrome  - EKG here showing sinus rhythm w/ frequent PVCs, no acute ST/T-wave changes, HR 98, QTc 454ms  - tele: afib w/ RVR vs. sinus rhythm w/ frequent ectopy  - [id number] score 3    Plan:  - S/p Micra placement 6/29   - c/w metoprolol succinate 25mg BID for rate control  - Cont Eliquis   - monitor on telemetry   - monitor electrolytes and replete for K>4, Mg>2

## 2023-07-04 NOTE — PROGRESS NOTE ADULT - PROBLEM SELECTOR PLAN 8
Hx of recurrent UTI, was started on IV ceftriaxone at [facility] (unclear start date)  - pt. currently denies any urinary symptoms including hesitancy, incontinence, dysuria, frequency, urgency    Plan:  - monitor off antibiotics for now

## 2023-07-04 NOTE — PROGRESS NOTE ADULT - SUBJECTIVE AND OBJECTIVE BOX
Merlin Mathew, MD   Hospitalist  Pager #[phone]    PROGRESS NOTE:     Patient is a 77y old  Male who presents with a chief complaint of PPM placement with EP (03 Jul 2023 11:55)      SUBJECTIVE / OVERNIGHT EVENTS: NEON   Patient feels fine, no complaints   Knee pain is improved     ADDITIONAL REVIEW OF SYSTEMS:    MEDICATIONS  (STANDING):  albuterol/ipratropium for Nebulization 3 milliLiter(s) Nebulizer every 6 hours  apixaban 5 milliGRAM(s) Oral two times a day  aspirin enteric coated 81 milliGRAM(s) Oral daily  atorvastatin 40 milliGRAM(s) Oral at bedtime  calcium acetate 667 milliGRAM(s) Oral three times a day with meals  chlorhexidine 2% Cloths 1 Application(s) Topical <User Schedule>  ferrous    sulfate 325 milliGRAM(s) Oral daily  metoprolol tartrate 25 milliGRAM(s) Oral two times a day  midodrine. 10 milliGRAM(s) Oral <User Schedule>  multivitamin 1 Tablet(s) Oral daily  pantoprazole    Tablet 40 milliGRAM(s) Oral before breakfast  senna 2 Tablet(s) Oral at bedtime  tamsulosin 0.4 milliGRAM(s) Oral at bedtime    MEDICATIONS  (PRN):  sodium chloride 0.9% Bolus. 100 milliLiter(s) IV Bolus every 5 minutes PRN SBP LESS THAN or EQUAL to 90 mmHg      CAPILLARY BLOOD GLUCOSE        I&O's Summary    03 Jul 2023 07:01  -  04 Jul 2023 07:00  --------------------------------------------------------  IN: 800 mL / OUT: 1510 mL / NET: -710 mL        PHYSICAL EXAM:  Vital Signs Last 24 Hrs  T(C): 36.6 (04 Jul 2023 10:49), Max: 36.8 (03 Jul 2023 21:07)  T(F): 97.8 (04 Jul 2023 10:49), Max: 98.2 (03 Jul 2023 21:07)  HR: 95 (04 Jul 2023 10:49) (63 - 98)  BP: 96/49 (04 Jul 2023 10:49) (96/49 - 108/57)  BP(mean): --  RR: 17 (04 Jul 2023 10:49) (17 - 18)  SpO2: 97% (04 Jul 2023 10:49) (97% - 100%)    Parameters below as of 04 Jul 2023 06:20  Patient On (Oxygen Delivery Method): room air        CONSTITUTIONAL: NAD, well-developed  RESPIRATORY: Normal respiratory effort; lungs are clear to auscultation bilaterally  CARDIOVASCULAR: Regular rate and rhythm, normal S1 and S2, no murmur/rub/gallop; No lower extremity edema; Peripheral pulses are 2+ bilaterally  ABDOMEN: Nontender to palpation, normoactive bowel sounds, no rebound/guarding; No hepatosplenomegaly  MUSCULOSKELETAL no clubbing or cyanosis of digits; no joint swelling or tenderness to palpation  PSYCH: A+O to person, place, and time; affect appropriate    LABS:                        9.5    6.73  )-----------( 260      ( 04 Jul 2023 06:12 )             29.9     07-04    140  |  99  |  25<H>  ----------------------------<  98  4.1   |  29  |  2.49<H>    Ca    9.6      04 Jul 2023 06:12  Phos  2.8     07-04  Mg     1.90     07-04            Urinalysis Basic - ( 04 Jul 2023 06:12 )    Color: x / Appearance: x / SG: x / pH: x  Gluc: 98 mg/dL / Ketone: x  / Bili: x / Urobili: x   Blood: x / Protein: x / Nitrite: x   Leuk Esterase: x / RBC: x / WBC x   Sq Epi: x / Non Sq Epi: x / Bacteria: x          RADIOLOGY & ADDITIONAL TESTS:  Results Reviewed:   Imaging Personally Reviewed:  Electrocardiogram Personally Reviewed:    COORDINATION OF CARE:  Care Discussed with Consultants/Other Providers [Y/N]:  Prior or Outpatient Records Reviewed [Y/N]:

## 2023-07-05 LAB
ANION GAP SERPL CALC-SCNC: 9 MMOL/L — SIGNIFICANT CHANGE UP (ref 7–14)
BUN SERPL-MCNC: 34 MG/DL — HIGH (ref 7–23)
CALCIUM SERPL-MCNC: 9.1 MG/DL — SIGNIFICANT CHANGE UP (ref 8.4–10.5)
CHLORIDE SERPL-SCNC: 101 MMOL/L — SIGNIFICANT CHANGE UP (ref 98–107)
CO2 SERPL-SCNC: 26 MMOL/L — SIGNIFICANT CHANGE UP (ref 22–31)
CREAT SERPL-MCNC: 3.12 MG/DL — HIGH (ref 0.5–1.3)
EGFR: 20 ML/MIN/1.73M2 — LOW
GLUCOSE SERPL-MCNC: 115 MG/DL — HIGH (ref 70–99)
HCT VFR BLD CALC: 28.8 % — LOW (ref 39–50)
HGB BLD-MCNC: 9.2 G/DL — LOW (ref 13–17)
MAGNESIUM SERPL-MCNC: 1.8 MG/DL — SIGNIFICANT CHANGE UP (ref 1.6–2.6)
MCHC RBC-ENTMCNC: 31.7 PG — SIGNIFICANT CHANGE UP (ref 27–34)
MCHC RBC-ENTMCNC: 31.9 GM/DL — LOW (ref 32–36)
MCV RBC AUTO: 99.3 FL — SIGNIFICANT CHANGE UP (ref 80–100)
NRBC # BLD: 0 /100 WBCS — SIGNIFICANT CHANGE UP (ref 0–0)
NRBC # FLD: 0 K/UL — SIGNIFICANT CHANGE UP (ref 0–0)
PHOSPHATE SERPL-MCNC: 2.8 MG/DL — SIGNIFICANT CHANGE UP (ref 2.5–4.5)
PLATELET # BLD AUTO: 253 K/UL — SIGNIFICANT CHANGE UP (ref 150–400)
POTASSIUM SERPL-MCNC: 3.8 MMOL/L — SIGNIFICANT CHANGE UP (ref 3.5–5.3)
POTASSIUM SERPL-SCNC: 3.8 MMOL/L — SIGNIFICANT CHANGE UP (ref 3.5–5.3)
RBC # BLD: 2.9 M/UL — LOW (ref 4.2–5.8)
RBC # FLD: 13.8 % — SIGNIFICANT CHANGE UP (ref 10.3–14.5)
SODIUM SERPL-SCNC: 136 MMOL/L — SIGNIFICANT CHANGE UP (ref 135–145)
WBC # BLD: 7.62 K/UL — SIGNIFICANT CHANGE UP (ref 3.8–10.5)
WBC # FLD AUTO: 7.62 K/UL — SIGNIFICANT CHANGE UP (ref 3.8–10.5)

## 2023-07-05 PROCEDURE — 99232 SBSQ HOSP IP/OBS MODERATE 35: CPT

## 2023-07-05 PROCEDURE — 90935 HEMODIALYSIS ONE EVALUATION: CPT

## 2023-07-05 RX ADMIN — ATORVASTATIN CALCIUM 40 MILLIGRAM(S): 80 TABLET, FILM COATED ORAL at 22:12

## 2023-07-05 RX ADMIN — Medication 1 TABLET(S): at 11:14

## 2023-07-05 RX ADMIN — APIXABAN 5 MILLIGRAM(S): 2.5 TABLET, FILM COATED ORAL at 17:25

## 2023-07-05 RX ADMIN — APIXABAN 5 MILLIGRAM(S): 2.5 TABLET, FILM COATED ORAL at 05:36

## 2023-07-05 RX ADMIN — Medication 25 MILLIGRAM(S): at 17:24

## 2023-07-05 RX ADMIN — Medication 3 MILLILITER(S): at 15:50

## 2023-07-05 RX ADMIN — Medication 325 MILLIGRAM(S): at 11:14

## 2023-07-05 RX ADMIN — PANTOPRAZOLE SODIUM 40 MILLIGRAM(S): 20 TABLET, DELAYED RELEASE ORAL at 05:37

## 2023-07-05 RX ADMIN — CHLORHEXIDINE GLUCONATE 1 APPLICATION(S): 213 SOLUTION TOPICAL at 05:40

## 2023-07-05 RX ADMIN — TAMSULOSIN HYDROCHLORIDE 0.4 MILLIGRAM(S): 0.4 CAPSULE ORAL at 22:12

## 2023-07-05 RX ADMIN — MIDODRINE HYDROCHLORIDE 10 MILLIGRAM(S): 2.5 TABLET ORAL at 05:38

## 2023-07-05 RX ADMIN — SENNA PLUS 2 TABLET(S): 8.6 TABLET ORAL at 22:12

## 2023-07-05 RX ADMIN — Medication 3 MILLILITER(S): at 22:47

## 2023-07-05 RX ADMIN — Medication 667 MILLIGRAM(S): at 11:14

## 2023-07-05 RX ADMIN — Medication 81 MILLIGRAM(S): at 11:14

## 2023-07-05 RX ADMIN — Medication 25 MILLIGRAM(S): at 11:14

## 2023-07-05 NOTE — PROGRESS NOTE ADULT - PROBLEM SELECTOR PROBLEM 3
ESRD on dialysis
Hyperphosphatemia
ESRD on dialysis

## 2023-07-05 NOTE — PROGRESS NOTE ADULT - SUBJECTIVE AND OBJECTIVE BOX
[facility] DIVISION OF KIDNEY DISEASES AND HYPERTENSION --   --------------------------------------------------------------------------------  Chief Complaint: ESRD/Ongoing hemodialysis requirement    24 hour events/subjective: Pt. seen and examined during HD today. Pt. feels well. No fever, CP, palpitations, SOB, HA or dizziness during HD rounds.    PAST HISTORY  --------------------------------------------------------------------------------  No significant changes to PMH, PSH, FHx, SHx, unless otherwise noted    ALLERGIES & MEDICATIONS  --------------------------------------------------------------------------------  Allergies    No Known Allergies    Intolerances    Standing Inpatient Medications  albuterol/ipratropium for Nebulization 3 milliLiter(s) Nebulizer every 6 hours  apixaban 5 milliGRAM(s) Oral two times a day  aspirin enteric coated 81 milliGRAM(s) Oral daily  atorvastatin 40 milliGRAM(s) Oral at bedtime  calcium acetate 667 milliGRAM(s) Oral three times a day with meals  chlorhexidine 2% Cloths 1 Application(s) Topical <User Schedule>  ferrous    sulfate 325 milliGRAM(s) Oral daily  metoprolol tartrate 25 milliGRAM(s) Oral two times a day  midodrine. 10 milliGRAM(s) Oral <User Schedule>  multivitamin 1 Tablet(s) Oral daily  pantoprazole    Tablet 40 milliGRAM(s) Oral before breakfast  senna 2 Tablet(s) Oral at bedtime  tamsulosin 0.4 milliGRAM(s) Oral at bedtime    PRN Inpatient Medications  sodium chloride 0.9% Bolus. 100 milliLiter(s) IV Bolus every 5 minutes PRN    REVIEW OF SYSTEMS  --------------------------------------------------------------------------------  Gen: No fever  Respiratory: No dyspnea  CV: No chest pain, no palpitations  GI: No abdominal pain  MSK: No edema  Neuro: No dizziness    VITALS/PHYSICAL EXAM  --------------------------------------------------------------------------------  T(C): 36.4 (07-05-23 @ 09:50), Max: 36.7 (07-04-23 @ 17:30)  HR: 96 (07-05-23 @ 09:50) (77 - 100)  BP: 102/61 (07-05-23 @ 09:50) (102/58 - 111/52)  RR: 17 (07-05-23 @ 09:50) (15 - 18)  SpO2: 98% (07-05-23 @ 05:24) (95% - 100%)  Wt(kg): --    07-04-23 @ 07:01  -  07-05-23 @ 07:00  --------------------------------------------------------  IN: 0 mL / OUT: 100 mL / NET: -100 mL    07-05-23 @ 07:01  -  07-05-23 @ 10:59  --------------------------------------------------------  IN: 400 mL / OUT: 1450 mL / NET: -1050 mL    Physical Exam:  	Gen: resting, NAD  	HEENT: No JVD  	Pulm: CTA B/L  	CV: S1S2+  	Abd: Soft  	LE: No edema  	Vascular access: LUE AVF being used for HD    LABS/STUDIES  --------------------------------------------------------------------------------              9.2    7.62  >-----------<  253      [07-05-23 @ 06:30]              28.8     136  |  101  |  34  ----------------------------<  115      [07-05-23 @ 06:30]  3.8   |  26  |  3.12        Ca     9.1     [07-05-23 @ 06:30]      Mg     1.80     [07-05-23 @ 06:30]      Phos  2.8     [07-05-23 @ 06:30]    HBsAb <3.0      [06-29-23 @ 06:30]  HBsAg Nonreact      [06-30-23 @ 05:20]  HCV 0.16, Nonreact      [06-30-23 @ 05:20]

## 2023-07-05 NOTE — PROGRESS NOTE ADULT - PROBLEM SELECTOR PROBLEM 1
ESRD on hemodialysis
ESRD on hemodialysis
Atrial fibrillation and flutter
ESRD on dialysis
Atrial fibrillation and flutter

## 2023-07-05 NOTE — PROGRESS NOTE ADULT - REASON FOR ADMISSION
PPM placement with EP

## 2023-07-05 NOTE — PROGRESS NOTE ADULT - PROBLEM SELECTOR PLAN 8
Hx of recurrent UTI, was started on IV ceftriaxone at [facility] (unclear start date)  - pt. currently denies any urinary symptoms including hesitancy, incontinence, dysuria, frequency, urgency    Plan:  - monitor off antibiotics for now

## 2023-07-05 NOTE — PROGRESS NOTE ADULT - PROBLEM SELECTOR PLAN 1
Pt. w/ hx of pAF and aflutter, transferred from [facility] for PPM placement for tachy-shy syndrome  - EKG here showing sinus rhythm w/ frequent PVCs, no acute ST/T-wave changes, HR 98, QTc 454ms  - tele: afib w/ RVR vs. sinus rhythm w/ frequent ectopy  - [id number] score 3    Plan:  - S/p Micra placement 6/29   - c/w metoprolol succinate 25mg BID for rate control  - Cont Eliquis   - monitor on telemetry   - monitor electrolytes and replete for K>4, Mg>2

## 2023-07-05 NOTE — PROGRESS NOTE ADULT - PROBLEM SELECTOR PLAN 4
Hx of CAD s/p NSTEMI in 2019 i/s/o cocaine use. On ASA 81mg and atorvastatin 40mg qhs at home  - L/RHC (11/2019): myocardial bridging in mid-LAD, pCx 30%, pRCA 100% )  - pt. currently denies chest pain, SOB, palpitations  - EKG w/o ischemic changes    Plan:  - c/w ASA 81mg qd  - c/w atorvastatin 40mg qhs  - monitor for chest pain or signs of ischemia
Hx of CAD s/p NSTEMI in 2019 i/s/o cocaine use. On ASA 81mg and atorvastatin 40mg qhs at home  - L/RHC (11/2019): myocardial bridging in mid-LAD, pCx 30%, pRCA 100% )  - pt. currently denies chest pain, SOB, palpitations  - EKG w/o ischemic changes    Plan:  - c/w ASA 81mg qd  - c/w atorvastatin 40mg qhs  - monitor for chest pain or signs of ischemia
Hx of CAD s/p NSTEMI in 2019 i/s/o cocaine use  - L/RHC (11/2019): myocardial bridging in mid-LAD, pCx 30%, pRCA 100% )  - EKG w/o ischemic changes    Plan:  - c/w ASA 81mg qd  - c/w atorvastatin 40mg qhs
Hx of CAD s/p NSTEMI in 2019 i/s/o cocaine use  - L/RHC (11/2019): myocardial bridging in mid-LAD, pCx 30%, pRCA 100% )  - EKG w/o ischemic changes    Plan:  - c/w ASA 81mg qd  - c/w atorvastatin 40mg qhs
Hx of CAD s/p NSTEMI in 2019 i/s/o cocaine use. On ASA 81mg and atorvastatin 40mg qhs at home  - L/RHC (11/2019): myocardial bridging in mid-LAD, pCx 30%, pRCA 100% )  - pt. currently denies chest pain, SOB, palpitations  - EKG w/o ischemic changes    Plan:  - c/w ASA 81mg qd  - c/w atorvastatin 40mg qhs
Hx of CAD s/p NSTEMI in 2019 i/s/o cocaine use. On ASA 81mg and atorvastatin 40mg qhs at home  - L/RHC (11/2019): myocardial bridging in mid-LAD, pCx 30%, pRCA 100% )  - pt. currently denies chest pain, SOB, palpitations  - EKG w/o ischemic changes    Plan:  - c/w ASA 81mg qd  - c/w atorvastatin 40mg qhs  - monitor for chest pain or signs of ischemia
Hx of CAD s/p NSTEMI in 2019 i/s/o cocaine use  - L/RHC (11/2019): myocardial bridging in mid-LAD, pCx 30%, pRCA 100% )  - EKG w/o ischemic changes    Plan:  - c/w ASA 81mg qd  - c/w atorvastatin 40mg qhs

## 2023-07-05 NOTE — PROGRESS NOTE ADULT - PROBLEM SELECTOR PLAN 5
Hx of HTN. Was on midodrine 10mg TID at [facility] for unclear reasons  - BP on admission 120/70    Plan:  - hold midodrine 10mg TID for now given intermittent bradycardia episodes and stable BPs  - monitor BPs
Hx of HTN. Was on midodrine 10mg TID at [facility] for unclear reasons  - BP on admission 120/70    Plan:  - hold midodrine 10mg TID for now given intermittent bradycardia episodes and stable BPs  - monitor BPs
Hx of HTN. Was on midodrine 10mg TID at [facility] for unclear reasons  - BP on admission 120/70    Plan:  - hold midodrine 10mg TID for now given intermittent bradycardia episodes and stable BPs  - monitor BPs
Hx of HTN. Was on midodrine 10mg TID at [facility] for unclear reasons  - Cont midodrine with HD  - monitor BP
Hx of HTN. Was on midodrine 10mg TID at [facility] for unclear reasons  - Cont midodrine with HD, BP running low but stable during HD and asymptomatic   - monitor BP
Hx of HTN. Was on midodrine 10mg TID at [facility] for unclear reasons  - BP on admission 120/70  - Cont midodrine with HD
Hx of HTN. Was on midodrine 10mg TID at [facility] for unclear reasons  - Cont midodrine with HD  - monitor BP

## 2023-07-05 NOTE — PROGRESS NOTE ADULT - ASSESSMENT
77M w/ HFpEF (EF 50% 5/2023), CAD s/p NSTEMI 2/2 cocaine use (2019), pAF/aflutter (on Eliquis), ESRD on HD (MWF via LUE AVF), HTN, BPH, recurrent UTI transferred from [facility] for PPM placement with EP, currently asymptomatic and HDS.
77M w/ HFpEF (EF 50% 5/2023), CAD s/p NSTEMI 2/2 cocaine use (2019), pAF/aflutter (on Eliquis), ESRD on HD (MWF via LUE AVF), HTN, BPH, recurrent UTI transferred from [facility] for PPM placement with EP, currently asymptomatic and HDS.
This is a 77 year old man with a pmh of HFpEF (EF 50% 5/2023), CAD s/p NSTEMI 2/2 cocaine use (2019), pAF/aflutter (on Eliquis), ESRD on HD (MWF via LUE AVF), HTN, BPH, and recurrent UTI who was presented to [facility] for SOB on 6/19/2023 and found to have Aflutter with tacy-bradycardia syndrome and monomorphic VT to 180s-200s that lasted 1 minute per documentation. Now transferred to [facility] for Aflutter ablation with possible PPM placement. TTE ([facility], 5/2023): EF 50%, no WMA, mildly increased LV wall thickness, dec RVSF, RV volume overload, mod dilated RA, mild AS, mild MS, mod-severe TR. Continue metoprolol 25mg qd. s/p Medtronic Micra AV pacemaker on 6/29.    -Resume Eliquis 5mg po BID for thromboembolic ppx  given that patient has a [id number] score of 3.   Post device implantation teaching with instructions provided.  Patient verbalized understanding.   -Continue metoprolol 25mg bid, can uptitrate if BP tolerates  -Follow up on  7/13 at 2:20pm.  Stable for discharge home.   
Pt. with ESRD on HD TIW. Pt. seen during HD today. /65 at time of HD rounds.
Pt. with ESRD on HD TIW. Pt. seen during HD today. BP 98/63 at time of HD rounds.
This is a 77 year old man with a pmh of HFpEF (EF 50% 5/2023), CAD s/p NSTEMI 2/2 cocaine use (2019), pAF/aflutter (on Eliquis), ESRD on HD (MWF via LUE AVF), HTN, BPH, and recurrent UTI who was presented to [facility] for SOB on 6/19/2023 and found to have Aflutter/atrial fibrillation with tacy-bradycardia syndrome. Now transferred to [facility] and s/p  s/p Medtronic Micra AV pacemaker on 6/29. Right groin site with no bleeding or hematoma. Had asymptomatic NSVT up to 25 beats during HD today. Patient didn't receive his am dose of metoprolol. TTE ([facility], 5/2023): EF 50%,   -Continue Eliquis 5mg po BID for thromboembolic ppx  given that patient has a [id number] score of 3.   -Continue metoprolol 25mg bid, can uptitrate if BP tolerates, give dose now  -Follow up with EP device clinic on  7/13 at 2:20pm  -Continue management as per primary team
ESRD on HD.
This is a 77 year old man with a pmh of HFpEF (EF 50% 5/2023), CAD s/p NSTEMI 2/2 cocaine use (2019), pAF/aflutter (on Eliquis), ESRD on HD (MWF via LUE AVF), HTN, BPH, and recurrent UTI who was presented to [facility] for SOB on 6/19/2023 and found to have Aflutter with tacy-bradycardia syndrome and monomorphic VT to 180s-200s that lasted 1 minute per documentation. Now transferred to [facility] for Aflutter ablation with possible PPM placement. TTE ([facility], 5/2023): EF 50%, no WMA, mildly increased LV wall thickness, dec RVSF, RV volume overload, mod dilated RA, mild AS, mild MS, mod-severe TR. Continue metoprolol 25mg qd.   Continue Eliquis 5mg po BID for thromboembolic ppx  given that patient has a [id number] score of 3.   Plan for possible PPM (possible leadless PM) today. Discussed with patient regarding risks/benefits and alternatives of PPM placement. Risks including and not limited to infection, bleeding, pericardial effusion, cardiac tamponade and death. All questions answered. Patient is alert, oriented x3. He consented for the procedure.   Keep patient NPO  Type and scree x2  CBC, BMP STAT  EP will follow
77M w/ HFpEF (EF 50% 5/2023), CAD s/p NSTEMI 2/2 cocaine use (2019), pAF/aflutter (on Eliquis), ESRD on HD (MWF via LUE AVF), HTN, BPH, recurrent UTI transferred from [facility] for PPM placement with EP, currently asymptomatic and HDS.
77M w/ HFpEF (EF 50% 5/2023), CAD s/p NSTEMI 2/2 cocaine use (2019), pAF/aflutter (on Eliquis), ESRD on HD (MWF via LUE AVF), HTN, BPH, recurrent UTI transferred from [facility] for PPM placement with EP, currently asymptomatic and HDS.
77M w/ HFpEF (EF 50% 5/2023), CAD s/p NSTEMI 2/2 cocaine use (2019), pAF/aflutter (on Eliquis), ESRD on HD (MWF via LUE AVF), HTN, BPH, recurrent UTI transferred from [facility] for PPM placement with EP, currently asymptomatic and HDS.
77M w/ HFpEF (EF 50% 5/2023), CAD s/p NSTEMI 2/2 cocaine use (2019), pAF/aflutter (on Eliquis), ESRD on HD (MWF via LUE AVF), HTN, BPH, recurrent UTI transferred from [facility] for PPM placement with EP, currently asymptomatic and HDS.
77M w/ HFpEF (EF 50% 5/2023), CAD s/p NSTEMI 2/2 cocaine use (2019), pAF/aflutter (on Eliquis), ESRD on HD (MWF via LUE AVF), HTN, BPH, recurrent UTI transferred from [facility] for PPM placement with EP, currently asymptomatic and HDS.

## 2023-07-05 NOTE — PROGRESS NOTE ADULT - PROBLEM SELECTOR PROBLEM 2
Chronic diastolic congestive heart failure
Hemodialysis-associated hypotension
Chronic diastolic congestive heart failure

## 2023-07-05 NOTE — PROGRESS NOTE ADULT - PROBLEM SELECTOR PLAN 9
DVT PPx: Eliquis   Diet: renal  Dispo: [facility] w/ HD placement, medically stable for discharge   Code Status: full code
DVT PPx: Eliquis   Diet: renal  Dispo: [facility] w/ HD placement, medically stable for discharge   Code Status: full code
DVT PPx: Eliquis   Diet: renal  Dispo: [facility] w/ HD placement, medically stable for discharge   Code Status: full code    DC time 36 minutes

## 2023-07-05 NOTE — PHARMACOTHERAPY INTERVENTION NOTE - COMMENTS
Discharge medications reviewed with the patient. Current medication schedule was discussed in detail including: medication name, indication, dose, administration times, treatment duration, side effects, and special instructions. Educated patient on apixaban including the purpose and administration. Discussed adverse effects in detail and when to seek medical attention (blood in stool, vomit, etc.). Questions and concerns were answered and addressed. Patient demonstrated understanding. Patient provided with educational handout.    New medications: metoprolol    Chantelle Jaime, PharmD, [facility]  Clinical Pharmacy Specialist  [phone]

## 2023-07-05 NOTE — PROGRESS NOTE ADULT - PROVIDER SPECIALTY LIST ADULT
Nephrology
Electrophysiology
Electrophysiology
Hospitalist
Electrophysiology
Nephrology
Hospitalist
Nephrology
Hospitalist
Hospice

## 2023-07-05 NOTE — PROGRESS NOTE ADULT - PROBLEM SELECTOR PLAN 1
Pt. tolerating HD. Pt. with chronic hypotension, on oral Midodrine therapy. BP low but stable during HD rounds today. AVF functioning well. Serum phosphorus below target range (2.8) today. Discontinue oral calcium acetate therapy. Monitor BP and labs.

## 2023-07-05 NOTE — PROGRESS NOTE ADULT - PROBLEM SELECTOR PLAN 2
Hx of HFpEF (EF 50% 5/2023), s/p IV Lasix 40mg qd and standing duonebs at [facility] but not discharged on diuretics  - TTE ([facility], 5/2023): EF 50%, no WMA, mildly increased LV wall thickness, decreased RVSF, RV volume overload, mod dilated RA, mild AS, mild MS, mod-severe TR  - proBNP 3500 (though unreliable given ESRD)  - pt. appears clinically euvolemic at this time    Plan:  - c/w metoprolol succinate 25mg BID  - Volume management w/ HD   - monitor daily standing weights, strict I/Os
Hx of HFpEF (EF 50% 5/2023), s/p IV Lasix 40mg qd and standing duonebs at [facility] but not discharged on diuretics  - TTE ([facility], 5/2023): EF 50%, no WMA, mildly increased LV wall thickness, decreased RVSF, RV volume overload, mod dilated RA, mild AS, mild MS, mod-severe TR  - proBNP 3500 (though unreliable given ESRD)  - pt. appears clinically euvolemic at this time    Plan:  - c/w metoprolol succinate 25mg BID  - Volume management w/ HD   - monitor daily standing weights, strict I/Os
Patient had midodrine but BP was still low.  Not overloaded on examination.  Decreased UF goal.
Hx of HFpEF (EF 50% 5/2023), s/p IV Lasix 40mg qd and standing duonebs at [facility] but not discharged on diuretics  - TTE ([facility], 5/2023): EF 50%, no WMA, mildly increased LV wall thickness, decreased RVSF, RV volume overload, mod dilated RA, mild AS, mild MS, mod-severe TR  - proBNP 3500 (though unreliable given ESRD)  - pt. appears clinically euvolemic at this time    Plan:  - c/w metoprolol succinate 25mg BID  - c/w Duonebs q6h  - Volume management w/ HD   - monitor daily standing weights, strict I/Os  - monitor electrolytes and replete for K>4, Mg>2
Hx of HFpEF (EF 50% 5/2023), s/p IV Lasix 40mg qd and standing duonebs at [facility] but not discharged on diuretics  - TTE ([facility], 5/2023): EF 50%, no WMA, mildly increased LV wall thickness, decreased RVSF, RV volume overload, mod dilated RA, mild AS, mild MS, mod-severe TR  - proBNP 3500 (though unreliable given ESRD)  - pt. appears clinically euvolemic at this time    Plan:  - c/w metoprolol succinate 25mg qd  - c/w Duonebs q6h  - Volume management w/ HD   - monitor daily standing weights, strict I/Os  - monitor electrolytes and replete for K>4, Mg>2
Hx of HFpEF (EF 50% 5/2023), s/p IV Lasix 40mg qd and standing duonebs at [facility] but not discharged on diuretics  - TTE ([facility], 5/2023): EF 50%, no WMA, mildly increased LV wall thickness, decreased RVSF, RV volume overload, mod dilated RA, mild AS, mild MS, mod-severe TR  - proBNP 3500 (though unreliable given ESRD)  - pt. appears clinically euvolemic at this time    Plan:  - c/w metoprolol succinate 25mg BID  - Volume management w/ HD   - monitor daily standing weights, strict I/Os
Hx of HFpEF (EF 50% 5/2023), s/p IV Lasix 40mg qd and standing duonebs at [facility] but not discharged on diuretics  - TTE ([facility], 5/2023): EF 50%, no WMA, mildly increased LV wall thickness, decreased RVSF, RV volume overload, mod dilated RA, mild AS, mild MS, mod-severe TR  - proBNP 3500 (though unreliable given ESRD)  - pt. appears clinically euvolemic at this time    Plan:  - c/w metoprolol succinate 25mg BID  - c/w Duonebs q6h  - Volume management w/ HD   - monitor daily standing weights, strict I/Os  - monitor electrolytes and replete for K>4, Mg>2
Hx of HFpEF (EF 50% 5/2023), s/p IV Lasix 40mg qd and standing duonebs at [facility] but not discharged on diuretics  - TTE ([facility], 5/2023): EF 50%, no WMA, mildly increased LV wall thickness, decreased RVSF, RV volume overload, mod dilated RA, mild AS, mild MS, mod-severe TR  - proBNP 3500 (though unreliable given ESRD)  - pt. appears clinically euvolemic at this time    Plan:  - c/w metoprolol succinate 25mg qd  - c/w Duonebs q6h  - pt appears to have been started on IV Lasix at [facility], unsure if pt was still on IV Lasix on discharge, will hold diuretics for now as pt appears clinically euvolemic and resume Lasix PRN  - monitor daily standing weights, strict I/Os  - monitor electrolytes and replete for K>4, Mg>2

## 2023-07-05 NOTE — PROGRESS NOTE ADULT - SUBJECTIVE AND OBJECTIVE BOX
PROGRESS NOTE:     Patient is a 77y old  Male who presents with a chief complaint of PPM placement with EP (05 Jul 2023 10:58)      SUBJECTIVE / OVERNIGHT EVENTS: no new complaints     ADDITIONAL REVIEW OF SYSTEMS:    MEDICATIONS  (STANDING):  albuterol/ipratropium for Nebulization 3 milliLiter(s) Nebulizer every 6 hours  apixaban 5 milliGRAM(s) Oral two times a day  aspirin enteric coated 81 milliGRAM(s) Oral daily  atorvastatin 40 milliGRAM(s) Oral at bedtime  chlorhexidine 2% Cloths 1 Application(s) Topical <User Schedule>  ferrous    sulfate 325 milliGRAM(s) Oral daily  metoprolol tartrate 25 milliGRAM(s) Oral two times a day  midodrine. 10 milliGRAM(s) Oral <User Schedule>  multivitamin 1 Tablet(s) Oral daily  pantoprazole    Tablet 40 milliGRAM(s) Oral before breakfast  senna 2 Tablet(s) Oral at bedtime  tamsulosin 0.4 milliGRAM(s) Oral at bedtime    MEDICATIONS  (PRN):  sodium chloride 0.9% Bolus. 100 milliLiter(s) IV Bolus every 5 minutes PRN SBP LESS THAN or EQUAL to 90 mmHg      CAPILLARY BLOOD GLUCOSE        I&O's Summary    04 Jul 2023 07:01  -  05 Jul 2023 07:00  --------------------------------------------------------  IN: 0 mL / OUT: 100 mL / NET: -100 mL    05 Jul 2023 07:01  -  05 Jul 2023 15:01  --------------------------------------------------------  IN: 400 mL / OUT: 1450 mL / NET: -1050 mL        PHYSICAL EXAM:  Vital Signs Last 24 Hrs  T(C): 36.6 (05 Jul 2023 11:10), Max: 36.7 (04 Jul 2023 17:30)  T(F): 97.9 (05 Jul 2023 11:10), Max: 98 (04 Jul 2023 17:30)  HR: 98 (05 Jul 2023 11:10) (77 - 100)  BP: 107/57 (05 Jul 2023 11:10) (102/58 - 111/52)  BP(mean): --  RR: 16 (05 Jul 2023 11:10) (15 - 18)  SpO2: 100% (05 Jul 2023 11:10) (95% - 100%)    Parameters below as of 05 Jul 2023 11:10  Patient On (Oxygen Delivery Method): room air        CONSTITUTIONAL: NAD, well-developed  RESPIRATORY: Normal respiratory effort; lungs are clear to auscultation bilaterally  CARDIOVASCULAR: Regular rate and rhythm, normal S1 and S2, no murmur/rub/gallop; No lower extremity edema; Peripheral pulses are 2+ bilaterally  ABDOMEN: Nontender to palpation, normoactive bowel sounds, no rebound/guarding; No hepatosplenomegaly  MUSCULOSKELETAL no clubbing or cyanosis of digits; no joint swelling or tenderness to palpation  PSYCH: A+O to person, place, and time; affect appropriate      LABS:                        9.2    7.62  )-----------( 253      ( 05 Jul 2023 06:30 )             28.8     07-05    136  |  101  |  34<H>  ----------------------------<  115<H>  3.8   |  26  |  3.12<H>    Ca    9.1      05 Jul 2023 06:30  Phos  2.8     07-05  Mg     1.80     07-05            Urinalysis Basic - ( 05 Jul 2023 06:30 )    Color: x / Appearance: x / SG: x / pH: x  Gluc: 115 mg/dL / Ketone: x  / Bili: x / Urobili: x   Blood: x / Protein: x / Nitrite: x   Leuk Esterase: x / RBC: x / WBC x   Sq Epi: x / Non Sq Epi: x / Bacteria: x      RADIOLOGY & ADDITIONAL TESTS:  Results Reviewed:   Imaging Personally Reviewed:  Electrocardiogram Personally Reviewed:    COORDINATION OF CARE:  Care Discussed with Consultants/Other Providers [Y/N]:  Prior or Outpatient Records Reviewed [Y/N]:

## 2023-07-05 NOTE — PROGRESS NOTE ADULT - PROBLEM SELECTOR PLAN 3
- Hx of ESRD on HD (MWF via LUE AVF). SCr 2.57 on admission.   - no indication for urgent HD at this time  - on calcium acetate 667mg TID at home.    Plan:  - Nephrology following   - c/w calcium acetate 667mg TID  - monitor electrolytes, including serum K, HCO3, Ca, and Phos  - avoid nephrotoxins, renally dose medications as per GFR  - monitor strict I/Os, daily weights, UOP, SCr  - Permacath removed 6/30
- Hx of ESRD on HD (MWF via LUE AVF).   - on calcium acetate 667mg TID at home.    Plan:  - Nephrology following   - c/w calcium acetate 667mg TID  - Permacath removed 6/30, AVF functioning well
- Hx of ESRD on HD (MWF via LUE AVF). SCr 2.57 on admission.   - no indication for urgent HD at this time  - on calcium acetate 667mg TID at home.    Plan:  - Nephrology following   - c/w calcium acetate 667mg TID  - monitor electrolytes, including serum K, HCO3, Ca, and Phos  - avoid nephrotoxins, renally dose medications as per GFR  - monitor strict I/Os, daily weights, UOP, SCr  [ ] Will d/w Nephro if patient still needs Permacath or if it can be removed
- Hx of ESRD on HD (MWF via LUE AVF). SCr 2.57 on admission.   - no indication for urgent HD at this time  - on calcium acetate 667mg TID at home.    Plan:  - Nephrology following   - c/w calcium acetate 667mg TID  - monitor strict I/Os, daily weights, UOP, SCr  - Permacath removed 6/30
- Hx of ESRD on HD (MWF via LUE AVF).     Plan:  - Nephrology following   - c/w calcium acetate 667mg TID  - Permacath removed 6/30, AVF functioning well
- Hx of ESRD on HD (MWF via LUE AVF). SCr 2.57 on admission.   - no indication for urgent HD at this time  - on calcium acetate 667mg TID at home.    Plan:  - Nephrology following   - c/w calcium acetate 667mg TID  - monitor electrolytes, including serum K, HCO3, Ca, and Phos  - avoid nephrotoxins, renally dose medications as per GFR  - monitor strict I/Os, daily weights, UOP, SCr  - Permacath removed 6/30
phos acceptable continue to monitor.
- Hx of ESRD on HD (MWF via LUE AVF).   - on calcium acetate 667mg TID at home.    Plan:  - Nephrology following   - c/w calcium acetate 667mg TID  - Permacath removed 6/30, AVF functioning well

## 2023-07-06 ENCOUNTER — TRANSCRIPTION ENCOUNTER (OUTPATIENT)
Age: 78
End: 2023-07-06

## 2023-07-06 VITALS
TEMPERATURE: 98 F | SYSTOLIC BLOOD PRESSURE: 101 MMHG | OXYGEN SATURATION: 96 % | RESPIRATION RATE: 18 BRPM | HEART RATE: 87 BPM | DIASTOLIC BLOOD PRESSURE: 56 MMHG

## 2023-07-06 LAB
ANION GAP SERPL CALC-SCNC: 12 MMOL/L — SIGNIFICANT CHANGE UP (ref 7–14)
BUN SERPL-MCNC: 22 MG/DL — SIGNIFICANT CHANGE UP (ref 7–23)
CALCIUM SERPL-MCNC: 9.2 MG/DL — SIGNIFICANT CHANGE UP (ref 8.4–10.5)
CHLORIDE SERPL-SCNC: 98 MMOL/L — SIGNIFICANT CHANGE UP (ref 98–107)
CO2 SERPL-SCNC: 28 MMOL/L — SIGNIFICANT CHANGE UP (ref 22–31)
CREAT SERPL-MCNC: 2.44 MG/DL — HIGH (ref 0.5–1.3)
EGFR: 26 ML/MIN/1.73M2 — LOW
GLUCOSE SERPL-MCNC: 95 MG/DL — SIGNIFICANT CHANGE UP (ref 70–99)
HCT VFR BLD CALC: 29.4 % — LOW (ref 39–50)
HGB BLD-MCNC: 9.2 G/DL — LOW (ref 13–17)
MAGNESIUM SERPL-MCNC: 1.8 MG/DL — SIGNIFICANT CHANGE UP (ref 1.6–2.6)
MCHC RBC-ENTMCNC: 31.3 GM/DL — LOW (ref 32–36)
MCHC RBC-ENTMCNC: 31.4 PG — SIGNIFICANT CHANGE UP (ref 27–34)
MCV RBC AUTO: 100.3 FL — HIGH (ref 80–100)
NRBC # BLD: 0 /100 WBCS — SIGNIFICANT CHANGE UP (ref 0–0)
NRBC # FLD: 0 K/UL — SIGNIFICANT CHANGE UP (ref 0–0)
PHOSPHATE SERPL-MCNC: 1.9 MG/DL — LOW (ref 2.5–4.5)
PLATELET # BLD AUTO: 239 K/UL — SIGNIFICANT CHANGE UP (ref 150–400)
POTASSIUM SERPL-MCNC: 3.6 MMOL/L — SIGNIFICANT CHANGE UP (ref 3.5–5.3)
POTASSIUM SERPL-SCNC: 3.6 MMOL/L — SIGNIFICANT CHANGE UP (ref 3.5–5.3)
RBC # BLD: 2.93 M/UL — LOW (ref 4.2–5.8)
RBC # FLD: 13.9 % — SIGNIFICANT CHANGE UP (ref 10.3–14.5)
SODIUM SERPL-SCNC: 138 MMOL/L — SIGNIFICANT CHANGE UP (ref 135–145)
WBC # BLD: 7.9 K/UL — SIGNIFICANT CHANGE UP (ref 3.8–10.5)
WBC # FLD AUTO: 7.9 K/UL — SIGNIFICANT CHANGE UP (ref 3.8–10.5)

## 2023-07-06 PROCEDURE — 99239 HOSP IP/OBS DSCHRG MGMT >30: CPT

## 2023-07-06 RX ORDER — CALCIUM ACETATE 667 MG
3 TABLET ORAL
Refills: 0 | DISCHARGE

## 2023-07-06 RX ORDER — MIDODRINE HYDROCHLORIDE 2.5 MG/1
1 TABLET ORAL
Refills: 0 | DISCHARGE

## 2023-07-06 RX ORDER — MIDODRINE HYDROCHLORIDE 2.5 MG/1
1 TABLET ORAL
Qty: 0 | Refills: 0 | DISCHARGE
Start: 2023-07-06

## 2023-07-06 RX ORDER — METOPROLOL TARTRATE 50 MG
1 TABLET ORAL
Refills: 0 | DISCHARGE

## 2023-07-06 RX ORDER — METOPROLOL TARTRATE 50 MG
1 TABLET ORAL
Qty: 0 | Refills: 0 | DISCHARGE
Start: 2023-07-06

## 2023-07-06 RX ADMIN — Medication 81 MILLIGRAM(S): at 08:17

## 2023-07-06 RX ADMIN — Medication 3 MILLILITER(S): at 10:19

## 2023-07-06 RX ADMIN — APIXABAN 5 MILLIGRAM(S): 2.5 TABLET, FILM COATED ORAL at 05:17

## 2023-07-06 RX ADMIN — Medication 25 MILLIGRAM(S): at 08:16

## 2023-07-06 RX ADMIN — Medication 325 MILLIGRAM(S): at 08:17

## 2023-07-06 RX ADMIN — Medication 3 MILLILITER(S): at 03:52

## 2023-07-06 RX ADMIN — PANTOPRAZOLE SODIUM 40 MILLIGRAM(S): 20 TABLET, DELAYED RELEASE ORAL at 05:20

## 2023-07-06 RX ADMIN — CHLORHEXIDINE GLUCONATE 1 APPLICATION(S): 213 SOLUTION TOPICAL at 05:19

## 2023-07-06 RX ADMIN — Medication 1 TABLET(S): at 08:17

## 2023-07-06 NOTE — DISCHARGE NOTE PROVIDER - NSDCMRMEDTOKEN_GEN_ALL_CORE_FT
apixaban 5 mg oral tablet: 1 tab(s) orally 2 times a day  aspirin 81 mg oral tablet: 1 tab(s) orally once a day  hospital  docusate sodium 100 mg oral capsule: 1 cap(s) orally once a day  DuoNeb 0.5 mg-2.5 mg/3 mL inhalation solution: 3 milliliter(s) by nebulizer every 6 hours  ferrous sulfate 324 mg (65 mg elemental iron) oral tablet: 1 tab(s) orally once a day  Lipitor 40 mg oral tablet: 1 tab(s) orally once a day  [facility]  midodrine 10 mg oral tablet: 1 tab(s) orally 3 times a day  Multiple Vitamins oral tablet: 1 tab(s) orally once a day  pantoprazole 40 mg oral delayed release tablet: 1 tab(s) orally once a day  PhosLo 667 mg oral capsule: 3 tab(s) orally 3 times a day  Senna 8.6 mg oral tablet: 1 tab(s) orally once a day  tamsulosin 0.4 mg oral capsule: 1 tab(s) orally once a day (at bedtime)  Toprol-XL 25 mg oral tablet, extended release: 1 tab(s) orally once a day   apixaban 5 mg oral tablet: 1 tab(s) orally 2 times a day  aspirin 81 mg oral tablet: 1 tab(s) orally once a day  hospital  docusate sodium 100 mg oral capsule: 1 cap(s) orally once a day  DuoNeb 0.5 mg-2.5 mg/3 mL inhalation solution: 3 milliliter(s) by nebulizer every 6 hours  ferrous sulfate 324 mg (65 mg elemental iron) oral tablet: 1 tab(s) orally once a day  Lipitor 40 mg oral tablet: 1 tab(s) orally once a day  hospital  metoprolol tartrate 25 mg oral tablet: 1 tab(s) orally 2 times a day  midodrine 10 mg oral tablet: 1 tab(s) orally 3 times a week pre HD  Multiple Vitamins oral tablet: 1 tab(s) orally once a day  pantoprazole 40 mg oral delayed release tablet: 1 tab(s) orally once a day  Senna 8.6 mg oral tablet: 1 tab(s) orally once a day  tamsulosin 0.4 mg oral capsule: 1 tab(s) orally once a day (at bedtime)

## 2023-07-06 NOTE — DISCHARGE NOTE PROVIDER - ATTENDING DISCHARGE PHYSICAL EXAMINATION:
CONSTITUTIONAL: NAD, well-developed  RESPIRATORY: Normal respiratory effort; lungs are clear to auscultation bilaterally  CARDIOVASCULAR: Regular rate and rhythm, normal S1 and S2, no murmur/rub/gallop; No lower extremity edema; Peripheral pulses are 2+ bilaterally  ABDOMEN: Nontender to palpation, normoactive bowel sounds, no rebound/guarding; No hepatosplenomegaly  MUSCULOSKELETAL no clubbing or cyanosis of digits; no joint swelling or tenderness to palpation  PSYCH: A+O to person, place, and time; affect appropriate

## 2023-07-06 NOTE — DISCHARGE NOTE PROVIDER - HOSPITAL COURSE
77M w/ HFpEF (EF 50% 5/2023), CAD s/p NSTEMI 2/2 cocaine use (2019), pAF/aflutter (on Eliquis), ESRD on HD (MWF via LUE AVF), HTN, BPH, recurrent UTI transferred from [facility] for PPM placement with EP     ·  Problem: Atrial fibrillation and flutter.   ·  Plan: Pt. w/ hx of pAF and aflutter, transferred from [facility] for PPM placement for tachy-shy syndrome  - EKG here showing sinus rhythm w/ frequent PVCs, no acute ST/T-wave changes, HR 98, QTc 454ms  - tele: afib w/ RVR vs. sinus rhythm w/ frequent ectopy  - S/p Micra placement 6/29   - c/w metoprolol tartrate 25mg BID for rate control  - [id number] score 3, cont Eliquis     ·  Problem: Chronic diastolic congestive heart failure.   ·  Plan: Hx of HFpEF (EF 50% 5/2023), s/p IV Lasix 40mg qd and standing duonebs at [facility] but not discharged on diuretics  - TTE ([facility], 5/2023): EF 50%, no WMA, mildly increased LV wall thickness, decreased RVSF, RV volume overload, mod dilated RA, mild AS, mild MS, mod-severe TR  - proBNP 3500 (though unreliable given ESRD)  - pt. appears clinically euvolemic at this time  - c/w metoprolol tartrate 25mg BID  - Volume management w/ HD   - monitor daily standing weights, strict I/Os.    ·  Problem: ESRD on dialysis.   ·  Plan: - Hx of ESRD on HD (MWF via LUE AVF).   - Nephrology following   - c/w calcium acetate 667mg TID  - Permacath removed 6/30, AVF functioning well.    ·  Problem: CAD (coronary artery disease).   ·  Plan: Hx of CAD s/p NSTEMI in 2019 i/s/o cocaine use  - L/RHC (11/2019): myocardial bridging in mid-LAD, pCx 30%, pRCA 100% )  - EKG w/o ischemic changes  - c/w ASA 81mg qd  - c/w atorvastatin 40mg qhs.    ·  Problem: HTN (hypertension).   ·  Plan: Hx of HTN. Was on midodrine 10mg TID at [facility] for unclear reasons  - Cont midodrine with HD, BP running low but stable during HD and asymptomatic   - monitor BP.    ·  Problem: NSVT (nonsustained ventricular tachycardia).   ·  Plan: - continue beta-blocker   - monitored electrolytes    - monitored on telemetry     ·  Problem: Recurrent UTI.   ·  Plan: Hx of recurrent UTI, was started on IV ceftriaxone at [facility] (unclear start date)  - pt. currently denies any urinary symptoms including hesitancy, incontinence, dysuria, frequency, urgency  - monitor off antibiotics

## 2023-07-06 NOTE — DISCHARGE NOTE PROVIDER - NSDCCPCAREPLAN_GEN_ALL_CORE_FT
PRINCIPAL DISCHARGE DIAGNOSIS  Diagnosis: Atrial fibrillation and flutter  Assessment and Plan of Treatment: You had pacemaker placed for tachy-shy syndrome. Continue Metoprolol tartrate and continue Eliquis.      SECONDARY DISCHARGE DIAGNOSES  Diagnosis: ESRD on dialysis  Assessment and Plan of Treatment: Continue dialysis as per renal.    Diagnosis: Chronic diastolic congestive heart failure  Assessment and Plan of Treatment: EF 50%. Continue Metoprolol tartrate. Volume managmenet with dialysis.    Diagnosis: CAD (coronary artery disease)  Assessment and Plan of Treatment: Continue Aspirin and Atorvastatin.

## 2023-07-06 NOTE — DISCHARGE NOTE NURSING/CASE MANAGEMENT/SOCIAL WORK - PATIENT PORTAL LINK FT
You can access the FollowMyHealth Patient Portal offered by [facility] by registering at the following website: [email or web address]. By joining "[employer]’s FollowMyHealth portal, you will also be able to view your health information using other applications (apps) compatible with our system.

## 2023-07-06 NOTE — DISCHARGE NOTE PROVIDER - CARE PROVIDER_API CALL
Nino Toth.  Cardiology  [address] [place] [address]  Phone: [phone]  Fax: [phone]  Follow Up Time:

## 2023-07-06 NOTE — DISCHARGE NOTE PROVIDER - NSDCFUSCHEDAPPT_GEN_ALL_CORE_FT
[facility] Physician [facility] [phone]  Scheduled Appointment: 07/13/2023

## 2023-07-13 ENCOUNTER — APPOINTMENT (OUTPATIENT)
Dept: ELECTROPHYSIOLOGY | Facility: CLINIC | Age: 78
End: 2023-07-13

## 2023-08-14 ENCOUNTER — NON-APPOINTMENT (OUTPATIENT)
Age: 78
End: 2023-08-14

## 2023-08-14 ENCOUNTER — APPOINTMENT (OUTPATIENT)
Dept: ELECTROPHYSIOLOGY | Facility: CLINIC | Age: 78
End: 2023-08-14
Payer: MEDICARE

## 2023-08-14 DIAGNOSIS — Z99.2 END STAGE RENAL DISEASE: ICD-10-CM

## 2023-08-14 DIAGNOSIS — I48.0 PAROXYSMAL ATRIAL FIBRILLATION: ICD-10-CM

## 2023-08-14 DIAGNOSIS — N18.6 END STAGE RENAL DISEASE: ICD-10-CM

## 2023-08-14 DIAGNOSIS — I49.5 SICK SINUS SYNDROME: ICD-10-CM

## 2023-08-14 PROCEDURE — 93279 PRGRMG DEV EVAL PM/LDLS PM: CPT

## 2023-09-17 ENCOUNTER — EMERGENCY (EMERGENCY)
Facility: HOSPITAL | Age: 78
LOS: 1 days | Discharge: HOME | End: 2023-09-17
Attending: EMERGENCY MEDICINE | Admitting: EMERGENCY MEDICINE
Payer: MEDICARE

## 2023-09-17 VITALS
SYSTOLIC BLOOD PRESSURE: 110 MMHG | RESPIRATION RATE: 15 BRPM | HEART RATE: 91 BPM | OXYGEN SATURATION: 98 % | TEMPERATURE: 98 F | DIASTOLIC BLOOD PRESSURE: 72 MMHG

## 2023-09-17 DIAGNOSIS — Z98.890 OTHER SPECIFIED POSTPROCEDURAL STATES: Chronic | ICD-10-CM

## 2023-09-17 PROCEDURE — 99283 EMERGENCY DEPT VISIT LOW MDM: CPT | Mod: 25

## 2023-09-17 NOTE — ED ADULT TRIAGE NOTE - CHIEF COMPLAINT QUOTE
presents from [facility] for left knee pain and swelling. as per EMS concern for septic joint. No complaints of chest pain, headache, nausea, dizziness, vomiting  SOB, fever, chills verbalized. phx HTn Afib CAD BPH

## 2023-09-18 VITALS
RESPIRATION RATE: 18 BRPM | DIASTOLIC BLOOD PRESSURE: 50 MMHG | OXYGEN SATURATION: 99 % | SYSTOLIC BLOOD PRESSURE: 102 MMHG | HEART RATE: 78 BPM | TEMPERATURE: 98 F

## 2023-09-18 PROCEDURE — 73564 X-RAY EXAM KNEE 4 OR MORE: CPT | Mod: 26,LT

## 2023-09-18 PROCEDURE — 73590 X-RAY EXAM OF LOWER LEG: CPT | Mod: 26,LT

## 2023-09-18 RX ORDER — ACETAMINOPHEN 500 MG
650 TABLET ORAL ONCE
Refills: 0 | Status: COMPLETED | OUTPATIENT
Start: 2023-09-18 | End: 2023-09-18

## 2023-09-18 RX ORDER — IBUPROFEN 200 MG
400 TABLET ORAL ONCE
Refills: 0 | Status: COMPLETED | OUTPATIENT
Start: 2023-09-18 | End: 2023-09-18

## 2023-09-18 RX ADMIN — Medication 650 MILLIGRAM(S): at 00:20

## 2023-09-18 RX ADMIN — Medication 400 MILLIGRAM(S): at 00:29

## 2023-09-18 NOTE — ED PROVIDER NOTE - NSFOLLOWUPINSTRUCTIONS_ED_ALL_ED_FT
– Return for any worsening or concerning symptoms (see below).  – Follow up with orthopedics in 3-5 days.    Knee Pain    WHAT YOU NEED TO KNOW:  Knee pain may start suddenly, or it may be a long-term problem. You may have pain on the side, front, or back of your knee. You may have knee stiffness and swelling. You may hear popping sounds or feel like your knee is giving way or locking up as you walk. You may feel pain when you sit, stand, walk, or climb up and down stairs. Knee pain can be caused by conditions such as obesity, inflammation, or strains or tears in ligaments or tendons.     DISCHARGE INSTRUCTIONS:  Return to the emergency department if:   •Your pain is worse, even after treatment.   •You cannot bend or straighten your leg completely.   •The swelling around your knee does not go down even with treatment.  •Your knee is painful and hot to the touch.   Contact your healthcare provider if:   •You have questions or concerns about your condition or care.   Medicines: You may need any of the following:   •NSAIDs help decrease swelling and pain or fever. This medicine is available with or without a doctor's order. NSAIDs can cause stomach bleeding or kidney problems in certain people. If you take blood thinner medicine, always ask your healthcare provider if NSAIDs are safe for you. Always read the medicine label and follow directions.  •Acetaminophen decreases pain and fever. It is available without a doctor's order. Ask how much to take and how often to take it. Follow directions. Read the labels of all other medicines you are using to see if they also contain acetaminophen, or ask your doctor or pharmacist. Acetaminophen can cause liver damage if not taken correctly. Do not use more than 4 grams (4,000 milligrams) total of acetaminophen in one day.   •Prescription pain medicine may be given. Ask your healthcare provider how to take this medicine safely. Some prescription pain medicines contain acetaminophen. Do not take other medicines that contain acetaminophen without talking to your healthcare provider. Too much acetaminophen may cause liver damage. Prescription pain medicine may cause constipation. Ask your healthcare provider how to prevent or treat constipation.   •Take your medicine as directed. Contact your healthcare provider if you think your medicine is not helping or if you have side effects. Tell him or her if you are allergic to any medicine. Keep a list of the medicines, vitamins, and herbs you take. Include the amounts, and when and why you take them. Bring the list or the pill bottles to follow-up visits. Carry your medicine list with you in case of an emergency.  What you can do to manage your symptoms:   •Rest your knee so it can heal. Limit activities that increase your pain. Do low-impact exercises, such as walking or swimming.   •Apply ice to help reduce swelling and pain. Use an ice pack, or put crushed ice in a plastic bag. Cover it with a towel before you apply it to your knee. Apply ice for 15 to 20 minutes every hour, or as directed.  •Apply compression to help reduce swelling. Use a brace or bandage only as directed.  •Elevate your knee to help decrease pain and swelling. Elevate your knee while you are sitting or lying down. Prop your leg on pillows to keep your knee above the level of your heart.  •Prevent your knee from moving as directed. Your healthcare provider may put on a cast or splint. You may need to wear a leg brace to stabilize your knee. A leg brace can be adjusted to increase your range of motion as your knee heals.  Hinged Knee Braces    What you can do to prevent knee pain:   •Maintain a healthy weight. Extra weight increases your risk for knee pain. Ask your healthcare provider how much you should weigh. He or she can help you create a safe weight loss plan if you need to lose weight.  •Exercise or train properly. Use the correct equipment for sports. Wear shoes that provide good support. Check your posture often as you exercise, play sports, or train for an event. This can help prevent stress and strain on your knees. Rest between sessions so you do not overwork your knees.  Follow up with your healthcare provider within 24 hours or as directed: You may need follow-up treatments, such as steroid injections to decrease pain. Write down your questions so you remember to ask them during your visits. – Return for any worsening or concerning symptoms (see below).  – Follow up with orthopedics in 3-5 days. Find contact information below, call to make an appointment.     Knee Pain    WHAT YOU NEED TO KNOW:  Knee pain may start suddenly, or it may be a long-term problem. You may have pain on the side, front, or back of your knee. You may have knee stiffness and swelling. You may hear popping sounds or feel like your knee is giving way or locking up as you walk. You may feel pain when you sit, stand, walk, or climb up and down stairs. Knee pain can be caused by conditions such as obesity, inflammation, or strains or tears in ligaments or tendons.     DISCHARGE INSTRUCTIONS:  Return to the emergency department if:   •Your pain is worse, even after treatment.   •You cannot bend or straighten your leg completely.   •The swelling around your knee does not go down even with treatment.  •Your knee is painful and hot to the touch.   Contact your healthcare provider if:   •You have questions or concerns about your condition or care.   Medicines: You may need any of the following:   •NSAIDs help decrease swelling and pain or fever. This medicine is available with or without a doctor's order. NSAIDs can cause stomach bleeding or kidney problems in certain people. If you take blood thinner medicine, always ask your healthcare provider if NSAIDs are safe for you. Always read the medicine label and follow directions.  •Acetaminophen decreases pain and fever. It is available without a doctor's order. Ask how much to take and how often to take it. Follow directions. Read the labels of all other medicines you are using to see if they also contain acetaminophen, or ask your doctor or pharmacist. Acetaminophen can cause liver damage if not taken correctly. Do not use more than 4 grams (4,000 milligrams) total of acetaminophen in one day.   •Prescription pain medicine may be given. Ask your healthcare provider how to take this medicine safely. Some prescription pain medicines contain acetaminophen. Do not take other medicines that contain acetaminophen without talking to your healthcare provider. Too much acetaminophen may cause liver damage. Prescription pain medicine may cause constipation. Ask your healthcare provider how to prevent or treat constipation.   •Take your medicine as directed. Contact your healthcare provider if you think your medicine is not helping or if you have side effects. Tell him or her if you are allergic to any medicine. Keep a list of the medicines, vitamins, and herbs you take. Include the amounts, and when and why you take them. Bring the list or the pill bottles to follow-up visits. Carry your medicine list with you in case of an emergency.  What you can do to manage your symptoms:   •Rest your knee so it can heal. Limit activities that increase your pain. Do low-impact exercises, such as walking or swimming.   •Apply ice to help reduce swelling and pain. Use an ice pack, or put crushed ice in a plastic bag. Cover it with a towel before you apply it to your knee. Apply ice for 15 to 20 minutes every hour, or as directed.  •Apply compression to help reduce swelling. Use a brace or bandage only as directed.  •Elevate your knee to help decrease pain and swelling. Elevate your knee while you are sitting or lying down. Prop your leg on pillows to keep your knee above the level of your heart.  •Prevent your knee from moving as directed. Your healthcare provider may put on a cast or splint. You may need to wear a leg brace to stabilize your knee. A leg brace can be adjusted to increase your range of motion as your knee heals.  Hinged Knee Braces    What you can do to prevent knee pain:   •Maintain a healthy weight. Extra weight increases your risk for knee pain. Ask your healthcare provider how much you should weigh. He or she can help you create a safe weight loss plan if you need to lose weight.  •Exercise or train properly. Use the correct equipment for sports. Wear shoes that provide good support. Check your posture often as you exercise, play sports, or train for an event. This can help prevent stress and strain on your knees. Rest between sessions so you do not overwork your knees.  Follow up with your healthcare provider within 24 hours or as directed: You may need follow-up treatments, such as steroid injections to decrease pain. Write down your questions so you remember to ask them during your visits.

## 2023-09-18 NOTE — ED PROVIDER NOTE - NSFOLLOWUPCLINICS_GEN_ALL_ED_FT
[facility] Orthopedic [facility]  Orthopedic Surgery  [address], [address] & [address] [place] [address]  Phone: [phone]  Fax:

## 2023-09-18 NOTE — ED PROVIDER NOTE - PATIENT PORTAL LINK FT
You can access the FollowMyHealth Patient Portal offered by [facility] by registering at the following website: [email or web address]. By joining [employer]’s FollowMyHealth portal, you will also be able to view your health information using other applications (apps) compatible with our system.

## 2023-09-18 NOTE — PROVIDER CONTACT NOTE (OTHER) - ASSESSMENT
The provider informed the  that the patient is ready for discharge and requires ambulance transportation  back to [place] . The  contacted [facility] and [facility] at [phone]. She revealed the address that was on file. The  contacted [facility] at [phone], and they provided Trip number [id number]. ETA Hour . Non-emergent ambulance transport form completed the  notifies the provider who agreed to the plan. No further SW intervention is required at this time.

## 2023-09-18 NOTE — ED ADULT NURSE NOTE - OBJECTIVE STATEMENT
Patient received in room 7. A&O3. Bedbound. Past medical history of dialysis (m,W,F), Afib on eliquis, HTN, CAD. Came into ED from [facility] with complaints of Left knee pain x 2 days. States pain is worse with movement. Patient appears well, no signs of acute distress noted. Respirations even and unlabored. Denies any recent trauma to Left knee. Denies SOB, chest pain, N/V/D, fevers. Comfort and safety maintained. Stretcher in lowest position. Call bell within reach.

## 2023-09-18 NOTE — ED PROVIDER NOTE - CLINICAL SUMMARY MEDICAL DECISION MAKING FREE TEXT BOX
Ashwin WILSON PGY-3: 77 M w/ HFpEF (EF 50% 5/2023), CAD s/p NSTEMI 2/2 cocaine use (2019), pAF/aflutter (on Eliquis), ESRD on HD (MWF via LUE AVF), HTN, BPH, s/p PPM placement from [place] presenting with L knee swelling and pain x 3 days. No fevers. Joint is mildly swollen, however non-erythematous and not warm to touch, low suspicion for septic joint. Denies other joint pain. Well appearing. Possible osteoarthritis. Will check knee XR and reassess. Ashwin WILSON PGY-3: 77 M w/ HFpEF (EF 50% 5/2023), CAD s/p NSTEMI 2/2 cocaine use (2019), pAF/aflutter (on Eliquis), ESRD on HD (MWF via LUE AVF), HTN, BPH, s/p PPM placement from [place] presenting with L knee swelling and pain x 3 days. No fevers. Joint is mildly swollen, however non-erythematous and not warm to touch, low suspicion for septic joint. Denies other joint pain. Well appearing. Clinical picture most c/w osteoarthritis. Knee pain is localized to knee joint as well as with localized knee effusion, no leg swelling/calf swelling or posterior ttp to suggest DVT. Will check knee XR and reassess.

## 2023-09-18 NOTE — ED PROVIDER NOTE - OBJECTIVE STATEMENT
77 M w/ HFpEF (EF 50% 5/2023), CAD s/p NSTEMI 2/2 cocaine use (2019), pAF/aflutter (on Eliquis), ESRD on HD (MWF via LUE AVF), HTN, BPH, s/p PPM placement from [place] presenting with L knee swelling and pain x 3 days. Patient is bedbound, had PPM placed recently. Patietn is denying any numbness or tingling in the LLE. Endorses decreased flexion 2/2 to pain and swelling. Denying other symptoms such as chest pain, sob, abdominal pain, nausea/vomiting, fever/chills.

## 2023-09-18 NOTE — ED ADULT NURSE NOTE - NSFALLHARMRISKINTERV_ED_ALL_ED

## 2023-09-18 NOTE — ED ADULT NURSE REASSESSMENT NOTE - NS ED NURSE REASSESS COMMENT FT1
Patient in spot 7. A&O3. Respirations even and unlabored. No signs of acute distress noted. Patient pending transportation back to [place]. Comfort and safety maintained. Stretcher in lowest position. Call bell within reach.

## 2023-09-18 NOTE — ED PROVIDER NOTE - PROGRESS NOTE DETAILS
Ashwin WILSON PGY-3: degenerative changes on knee XR. most likely OA of the knee. will dc with outpatient follow up. Ashwin WILSON PGY-3: Pt was re-evaluated at bedside, VSS, feeling well overall. Return precautions and follow up plan with PCP and/or specialist were discussed. Time was taken to answer any questions that the patient had before providing them with discharge paperwork.

## 2023-09-18 NOTE — ED PROVIDER NOTE - PHYSICAL EXAMINATION
Gen: NAD  Head: NCAT  Eyes: EOMI, PERRLA, no conjunctival pallor, no scleral icterus  ENT: mucous membranes moist, no discharge  Neck: neck supple  Resp: CTAB, no W/R/R  CV: RRR, +S1/S2, no M/R/G  GI: Abdomen soft non-distended, NTTP, no masses  MSK: No open wounds, no bruising, no lower extremity edema  Neuro: A&Ox4, sensation nl, MAEx4, follows commands  Ext: +decreased flexion L knee. knee joint non-erythematous and not warm to touch. neurovascularly intact.  Skin: no rash or bruising

## 2023-09-18 NOTE — ED PROVIDER NOTE - ATTENDING CONTRIBUTION TO CARE
76 yo PMH CAD, CHF, a flutter ESRD presenting with L knee pain and swelling for 3 days.  NO known trauma. No SOB or CP    Gen:  Well appearing in NAD  Head:  NC/AT  Resp: No distress   Ext: ROM limited secondary to pain but there is good passive ROM.  Good distal PMS no other swelling to the leg except the anterior knee, TTP over the anterior knee  Skin: warm and dry as visualized     76 yo with L knee pain.  Differential includes but is not limited to MSK Pain, septic joint, DVT.  DVT considered less likely in the setting of localized swelling and ttp on the anterior knee.  There is a passive ROM making septic joint less likely.  Xray to eval for bony injury

## 2023-09-18 NOTE — ED ADULT NURSE NOTE - CHIEF COMPLAINT QUOTE
presents from [facility] for left knee pain and swelling. as per EMS concern for septic joint. No complaints of chest pain, headache, nausea, dizziness, vomiting  SOB, fever, chills verbalized. phx HTn Afib CAD BPH

## 2023-11-29 ENCOUNTER — APPOINTMENT (OUTPATIENT)
Dept: ELECTROPHYSIOLOGY | Facility: CLINIC | Age: 78
End: 2023-11-29

## 2023-12-29 ENCOUNTER — APPOINTMENT (OUTPATIENT)
Dept: ELECTROPHYSIOLOGY | Facility: CLINIC | Age: 78
End: 2023-12-29

## 2024-01-27 NOTE — H&P ADULT - NSHPLABSRESULTS_GEN_ALL_CORE
11.0   10.44 )-----------( 146      ( 06 Nov 2019 17:29 )             33.9   11-06    135  |  102  |  16  ----------------------------<  207<H>  4.1   |  20<L>  |  0.90    Ca    8.1<L>      06 Nov 2019 17:29    TPro  6.0  /  Alb  3.3  /  TBili  0.4  /  DBili  x   /  AST  142<H>  /  ALT  61<H>  /  AlkPhos  157<H>  11-06    Blood Gas Profile - Arterial (11.06.19 @ 19:35)    pH, Arterial: 7.44    pCO2, Arterial: 28 mmHg    pO2, Arterial: 84 mmHg    HCO3, Arterial: 19 mmol/L    Base Excess, Arterial: -4.0 mmol/L    Oxygen Saturation, Arterial: 97 %    Total CO2, Arterial: 20 mmoL/L    FIO2, Arterial: 21    Blood Gas Source Arterial: Arterial    < from: Xray Chest 1 View- PORTABLE-Urgent (11.06.19 @ 17:00) >    INTERPRETATION:  limited secondary to patient rotation.    otherwise, no emergent findings.     follow up official report in AM    < end of copied text > [facility]  Interventional Cardiology  [address], [address]  [place] [address]  Phone: [phone]  Fax: [phone]  Follow Up Time: 7-10 Days    Karoline Shelton  Gastroenterology  [facility] [place] [address]  Phone: [phone]  Fax: [phone]  Follow Up Time: 7-10 Days   11.0   10.44 )-----------( 146      ( 06 Nov 2019 17:29 )             33.9   11-06    135  |  102  |  16  ----------------------------<  207<H>  4.1   |  20<L>  |  0.90    Ca    8.1<L>      06 Nov 2019 17:29    TPro  6.0  /  Alb  3.3  /  TBili  0.4  /  DBili  x   /  AST  142<H>  /  ALT  61<H>  /  AlkPhos  157<H>  11-06    Blood Gas Profile - Arterial (11.06.19 @ 19:35)    pH, Arterial: 7.44    pCO2, Arterial: 28 mmHg    pO2, Arterial: 84 mmHg    HCO3, Arterial: 19 mmol/L    Base Excess, Arterial: -4.0 mmol/L    Oxygen Saturation, Arterial: 97 %    Total CO2, Arterial: 20 mmoL/L    FIO2, Arterial: 21    Blood Gas Source Arterial: Arterial    < from: Xray Chest 1 View- PORTABLE-Urgent (11.06.19 @ 17:00) >    INTERPRETATION:  limited secondary to patient rotation.    otherwise, no emergent findings.     follow up official report in AM    < end of copied text >    ECG: Sinus shy    All labs, imaging reviewed by me. labs reviewed   11.0   10.44 )-----------( 146      ( 06 Nov 2019 17:29 )             33.9   11-06    135  |  102  |  16  ----------------------------<  207<H>  4.1   |  20<L>  |  0.90    Ca    8.1<L>      06 Nov 2019 17:29    TPro  6.0  /  Alb  3.3  /  TBili  0.4  /  DBili  x   /  AST  142<H>  /  ALT  61<H>  /  AlkPhos  157<H>  11-06    Blood Gas Profile - Arterial (11.06.19 @ 19:35)    pH, Arterial: 7.44    pCO2, Arterial: 28 mmHg    pO2, Arterial: 84 mmHg    HCO3, Arterial: 19 mmol/L    Base Excess, Arterial: -4.0 mmol/L    Oxygen Saturation, Arterial: 97 %    Total CO2, Arterial: 20 mmoL/L    FIO2, Arterial: 21    Blood Gas Source Arterial: Arterial    Xray Chest 1 View- PORTABLE-Urgent reviewed    INTERPRETATION:  limited secondary to patient rotation.    otherwise, no emergent findings.     follow up official report in AM    < end of copied text >    ECG reviewed: Sinus shy    All labs, imaging reviewed by me.

## 2024-02-01 ENCOUNTER — APPOINTMENT (OUTPATIENT)
Dept: ELECTROPHYSIOLOGY | Facility: CLINIC | Age: 79
End: 2024-02-01

## 2024-02-22 ENCOUNTER — APPOINTMENT (OUTPATIENT)
Dept: ELECTROPHYSIOLOGY | Facility: CLINIC | Age: 79
End: 2024-02-22

## 2024-03-04 ENCOUNTER — APPOINTMENT (OUTPATIENT)
Dept: ELECTROPHYSIOLOGY | Facility: CLINIC | Age: 79
End: 2024-03-04

## 2024-05-01 NOTE — PROGRESS NOTE ADULT - PROBLEM SELECTOR PLAN 6
Not on AC at home. Alb normal/low   - likely transient from cardiac injury Principal Discharge DX:	Strangulation or suffocation, initial encounter   1

## 2024-05-20 ENCOUNTER — NON-APPOINTMENT (OUTPATIENT)
Age: 79
End: 2024-05-20

## 2024-05-20 ENCOUNTER — APPOINTMENT (OUTPATIENT)
Dept: ELECTROPHYSIOLOGY | Facility: CLINIC | Age: 79
End: 2024-05-20
Payer: MEDICARE

## 2024-05-20 ENCOUNTER — APPOINTMENT (OUTPATIENT)
Dept: ELECTROPHYSIOLOGY | Facility: CLINIC | Age: 79
End: 2024-05-20

## 2024-05-20 VITALS
BODY MASS INDEX: 28 KG/M2 | WEIGHT: 200 LBS | OXYGEN SATURATION: 99 % | DIASTOLIC BLOOD PRESSURE: 76 MMHG | HEIGHT: 71 IN | SYSTOLIC BLOOD PRESSURE: 110 MMHG | HEART RATE: 71 BPM

## 2024-05-20 PROCEDURE — 93279 PRGRMG DEV EVAL PM/LDLS PM: CPT

## 2024-06-18 RX ORDER — METOPROLOL TARTRATE 25 MG/1
25 TABLET, FILM COATED ORAL TWICE DAILY
Refills: 0 | Status: ACTIVE | COMMUNITY

## 2024-06-18 RX ORDER — ATORVASTATIN CALCIUM 40 MG/1
40 TABLET, FILM COATED ORAL
Refills: 0 | Status: ACTIVE | COMMUNITY

## 2024-06-18 RX ORDER — MIDODRINE HYDROCHLORIDE 10 MG/1
10 TABLET ORAL
Refills: 0 | Status: ACTIVE | COMMUNITY

## 2024-06-18 RX ORDER — PANTOPRAZOLE 40 MG/1
40 TABLET, DELAYED RELEASE ORAL
Refills: 0 | Status: ACTIVE | COMMUNITY

## 2024-06-18 RX ORDER — APIXABAN 5 MG/1
5 TABLET, FILM COATED ORAL
Refills: 0 | Status: ACTIVE | COMMUNITY

## 2024-06-18 RX ORDER — TAMSULOSIN HYDROCHLORIDE 0.4 MG/1
0.4 CAPSULE ORAL
Refills: 0 | Status: ACTIVE | COMMUNITY

## 2024-08-19 ENCOUNTER — NON-APPOINTMENT (OUTPATIENT)
Age: 79
End: 2024-08-19

## 2024-08-19 ENCOUNTER — APPOINTMENT (OUTPATIENT)
Dept: ELECTROPHYSIOLOGY | Facility: CLINIC | Age: 79
End: 2024-08-19
Payer: MEDICARE

## 2024-08-19 PROCEDURE — 93294 REM INTERROG EVL PM/LDLS PM: CPT

## 2024-08-19 PROCEDURE — 93296 REM INTERROG EVL PM/IDS: CPT

## 2024-11-18 ENCOUNTER — APPOINTMENT (OUTPATIENT)
Dept: ELECTROPHYSIOLOGY | Facility: CLINIC | Age: 79
End: 2024-11-18

## 2024-12-23 ENCOUNTER — APPOINTMENT (OUTPATIENT)
Dept: ELECTROPHYSIOLOGY | Facility: CLINIC | Age: 79
End: 2024-12-23

## 2025-01-27 ENCOUNTER — APPOINTMENT (OUTPATIENT)
Dept: ELECTROPHYSIOLOGY | Facility: CLINIC | Age: 80
End: 2025-01-27

## 2025-02-14 NOTE — PATIENT PROFILE ADULT - PATIENT REPRESENTATIVE: ( YOU CAN CHOOSE ANY PERSON THAT CAN ASSIST YOU WITH YOUR HEALTH CARE PREFERENCES, DOES NOT HAVE TO BE A SPOUSE, IMMEDIATE FAMILY OR SIGNIFICANT OTHER/PARTNER)
Pts wife stated that pts pcp wants to increase Potassium Chloride 20 meq from 1 tablet daily to 2 tabs daily but they wanted to check on if npde agrees with that. Please advise.    yes

## 2025-03-03 NOTE — PROGRESS NOTE ADULT - PROBLEM SELECTOR PROBLEM 2
Detail Level: Detailed When Should The Patient Follow-Up For Their Next Full-Body Skin Exam?: 6 Months Quality 137: Melanoma: Continuity Of Care - Recall System: Patient information entered into a recall system that includes: target date for the next exam specified AND a process to follow up with patients regarding missed or unscheduled appointments NSTEMI (non-ST elevated myocardial infarction)

## 2025-03-11 ENCOUNTER — APPOINTMENT (OUTPATIENT)
Dept: ELECTROPHYSIOLOGY | Facility: CLINIC | Age: 80
End: 2025-03-11

## 2025-04-15 ENCOUNTER — APPOINTMENT (OUTPATIENT)
Dept: ELECTROPHYSIOLOGY | Facility: CLINIC | Age: 80
End: 2025-04-15